# Patient Record
Sex: FEMALE | Race: WHITE | Employment: OTHER | URBAN - NONMETROPOLITAN AREA
[De-identification: names, ages, dates, MRNs, and addresses within clinical notes are randomized per-mention and may not be internally consistent; named-entity substitution may affect disease eponyms.]

---

## 2017-01-30 PROBLEM — E11.9 TYPE 2 DIABETES MELLITUS WITHOUT COMPLICATION (HCC): Status: ACTIVE | Noted: 2017-01-30

## 2017-01-30 PROBLEM — N18.30 CHRONIC RENAL FAILURE, STAGE 3 (MODERATE) (HCC): Status: ACTIVE | Noted: 2017-01-30

## 2017-01-30 PROBLEM — I27.0 PRIMARY PULMONARY HYPERTENSION (HCC): Status: ACTIVE | Noted: 2017-01-30

## 2017-01-30 PROBLEM — I10 ESSENTIAL HYPERTENSION: Status: ACTIVE | Noted: 2017-01-30

## 2017-02-28 RX ORDER — TADALAFIL 20 MG/1
40 TABLET ORAL DAILY
Qty: 180 TABLET | Refills: 3 | Status: SHIPPED | OUTPATIENT
Start: 2017-02-28 | End: 2018-03-12 | Stop reason: SDUPTHER

## 2017-03-06 RX ORDER — LOSARTAN POTASSIUM 100 MG/1
TABLET ORAL
Qty: 90 TABLET | Refills: 3 | Status: SHIPPED | OUTPATIENT
Start: 2017-03-06 | End: 2018-02-27 | Stop reason: SDUPTHER

## 2017-04-17 PROBLEM — J30.9 ALLERGIC RHINITIS: Status: ACTIVE | Noted: 2017-04-17

## 2017-05-01 ENCOUNTER — APPOINTMENT (OUTPATIENT)
Dept: VASCULAR LAB | Age: 82
DRG: 811 | End: 2017-05-01
Payer: MEDICARE

## 2017-05-01 ENCOUNTER — HOSPITAL ENCOUNTER (INPATIENT)
Age: 82
LOS: 2 days | Discharge: HOME | DRG: 811 | End: 2017-05-03
Attending: EMERGENCY MEDICINE | Admitting: EMERGENCY MEDICINE
Payer: MEDICARE

## 2017-05-01 ENCOUNTER — APPOINTMENT (OUTPATIENT)
Dept: CT IMAGING | Age: 82
DRG: 811 | End: 2017-05-01
Payer: MEDICARE

## 2017-05-01 DIAGNOSIS — G57.91 NEUROPATHY OF RIGHT LOWER EXTREMITY: Primary | ICD-10-CM

## 2017-05-01 DIAGNOSIS — D64.9 ANEMIA, UNSPECIFIED TYPE: ICD-10-CM

## 2017-05-01 DIAGNOSIS — K92.2 LOWER GI BLEED: ICD-10-CM

## 2017-05-01 PROBLEM — D50.0 ANEMIA DUE TO GI BLOOD LOSS: Status: ACTIVE | Noted: 2017-05-01

## 2017-05-01 LAB
ABSOLUTE EOS #: 0.3 K/UL (ref 0–0.4)
ABSOLUTE LYMPH #: 1.5 K/UL (ref 1–4.8)
ABSOLUTE MONO #: 0.5 K/UL (ref 0–1)
ANION GAP SERPL CALCULATED.3IONS-SCNC: 10 MMOL/L (ref 9–17)
BASOPHILS # BLD: 0 %
BASOPHILS ABSOLUTE: 0 K/UL (ref 0–0.2)
BILIRUBIN URINE: NEGATIVE
BUN BLDV-MCNC: 23 MG/DL (ref 8–23)
BUN/CREAT BLD: 24 (ref 9–20)
CALCIUM SERPL-MCNC: 8.4 MG/DL (ref 8.6–10.4)
CHLORIDE BLD-SCNC: 105 MMOL/L (ref 98–107)
CO2: 26 MMOL/L (ref 20–31)
COLOR: YELLOW
COMMENT UA: NORMAL
CREAT SERPL-MCNC: 0.97 MG/DL (ref 0.5–0.9)
D-DIMER QUANTITATIVE: 1.56 MG/L FEU (ref 0.19–0.5)
DIFFERENTIAL TYPE: ABNORMAL
EOSINOPHILS RELATIVE PERCENT: 6 %
FERRITIN: 16 UG/L (ref 13–150)
GFR AFRICAN AMERICAN: >60 ML/MIN
GFR NON-AFRICAN AMERICAN: 54 ML/MIN
GFR SERPL CREATININE-BSD FRML MDRD: ABNORMAL ML/MIN/{1.73_M2}
GFR SERPL CREATININE-BSD FRML MDRD: ABNORMAL ML/MIN/{1.73_M2}
GLUCOSE BLD-MCNC: 121 MG/DL (ref 70–99)
GLUCOSE URINE: NEGATIVE
HCT VFR BLD CALC: 26.3 % (ref 36–46)
HEMOGLOBIN: 8.3 G/DL (ref 12–16)
IRON SATURATION: 7 % (ref 20–55)
IRON: 25 UG/DL (ref 37–145)
KETONES, URINE: NEGATIVE
LEUKOCYTE ESTERASE, URINE: NEGATIVE
LYMPHOCYTES # BLD: 27 %
MCH RBC QN AUTO: 28.2 PG (ref 26–34)
MCHC RBC AUTO-ENTMCNC: 31.7 G/DL (ref 31–37)
MCV RBC AUTO: 89 FL (ref 80–100)
MONOCYTES # BLD: 10 %
NITRITE, URINE: NEGATIVE
PDW BLD-RTO: 15.9 % (ref 12.1–15.2)
PH UA: 7 (ref 5–9)
PLATELET # BLD: 207 K/UL (ref 140–450)
PLATELET ESTIMATE: ABNORMAL
PMV BLD AUTO: ABNORMAL FL (ref 6–12)
POTASSIUM SERPL-SCNC: 5.2 MMOL/L (ref 3.7–5.3)
PROTEIN UA: NEGATIVE
RBC # BLD: 2.95 M/UL (ref 4–5.2)
RBC # BLD: ABNORMAL 10*6/UL
SEDIMENTATION RATE, ERYTHROCYTE: 34 MM (ref 0–20)
SEG NEUTROPHILS: 57 %
SEGMENTED NEUTROPHILS ABSOLUTE COUNT: 3.2 K/UL (ref 1.8–7.7)
SODIUM BLD-SCNC: 141 MMOL/L (ref 135–144)
SPECIFIC GRAVITY UA: 1.01 (ref 1.01–1.02)
TOTAL IRON BINDING CAPACITY: 346 UG/DL (ref 250–450)
TURBIDITY: CLEAR
UNSATURATED IRON BINDING CAPACITY: 321.1 UG/DL (ref 112–347)
URIC ACID: 6.4 MG/DL (ref 2.4–5.7)
URINE HGB: NEGATIVE
UROBILINOGEN, URINE: NORMAL
WBC # BLD: 5.6 K/UL (ref 3.5–11)
WBC # BLD: ABNORMAL 10*3/UL

## 2017-05-01 PROCEDURE — 84550 ASSAY OF BLOOD/URIC ACID: CPT

## 2017-05-01 PROCEDURE — 99285 EMERGENCY DEPT VISIT HI MDM: CPT

## 2017-05-01 PROCEDURE — 1200000000 HC SEMI PRIVATE

## 2017-05-01 PROCEDURE — 72131 CT LUMBAR SPINE W/O DYE: CPT

## 2017-05-01 PROCEDURE — 36415 COLL VENOUS BLD VENIPUNCTURE: CPT

## 2017-05-01 PROCEDURE — 93971 EXTREMITY STUDY: CPT

## 2017-05-01 PROCEDURE — 85379 FIBRIN DEGRADATION QUANT: CPT

## 2017-05-01 PROCEDURE — 6370000000 HC RX 637 (ALT 250 FOR IP): Performed by: FAMILY MEDICINE

## 2017-05-01 PROCEDURE — 83540 ASSAY OF IRON: CPT

## 2017-05-01 PROCEDURE — 82728 ASSAY OF FERRITIN: CPT

## 2017-05-01 PROCEDURE — 85651 RBC SED RATE NONAUTOMATED: CPT

## 2017-05-01 PROCEDURE — 81003 URINALYSIS AUTO W/O SCOPE: CPT

## 2017-05-01 PROCEDURE — 83550 IRON BINDING TEST: CPT

## 2017-05-01 PROCEDURE — 85025 COMPLETE CBC W/AUTO DIFF WBC: CPT

## 2017-05-01 PROCEDURE — 80048 BASIC METABOLIC PNL TOTAL CA: CPT

## 2017-05-01 PROCEDURE — 2580000003 HC RX 258: Performed by: FAMILY MEDICINE

## 2017-05-01 RX ORDER — LOSARTAN POTASSIUM 50 MG/1
50 TABLET ORAL DAILY
Status: DISCONTINUED | OUTPATIENT
Start: 2017-05-02 | End: 2017-05-03 | Stop reason: HOSPADM

## 2017-05-01 RX ORDER — FERROUS SULFATE 325(65) MG
325 TABLET ORAL
Status: DISCONTINUED | OUTPATIENT
Start: 2017-05-02 | End: 2017-05-03 | Stop reason: HOSPADM

## 2017-05-01 RX ORDER — ACETAMINOPHEN 325 MG/1
650 TABLET ORAL EVERY 4 HOURS PRN
Status: DISCONTINUED | OUTPATIENT
Start: 2017-05-01 | End: 2017-05-03 | Stop reason: HOSPADM

## 2017-05-01 RX ORDER — ATORVASTATIN CALCIUM 40 MG/1
40 TABLET, FILM COATED ORAL DAILY
Status: DISCONTINUED | OUTPATIENT
Start: 2017-05-01 | End: 2017-05-03 | Stop reason: HOSPADM

## 2017-05-01 RX ORDER — TADALAFIL 20 MG/1
40 TABLET ORAL DAILY
Status: DISCONTINUED | OUTPATIENT
Start: 2017-05-01 | End: 2017-05-03 | Stop reason: HOSPADM

## 2017-05-01 RX ORDER — FUROSEMIDE 20 MG/1
20 TABLET ORAL DAILY
Status: DISCONTINUED | OUTPATIENT
Start: 2017-05-02 | End: 2017-05-03 | Stop reason: HOSPADM

## 2017-05-01 RX ORDER — SODIUM CHLORIDE 0.9 % (FLUSH) 0.9 %
10 SYRINGE (ML) INJECTION EVERY 12 HOURS SCHEDULED
Status: DISCONTINUED | OUTPATIENT
Start: 2017-05-01 | End: 2017-05-03 | Stop reason: HOSPADM

## 2017-05-01 RX ORDER — ONDANSETRON 2 MG/ML
4 INJECTION INTRAMUSCULAR; INTRAVENOUS EVERY 6 HOURS PRN
Status: DISCONTINUED | OUTPATIENT
Start: 2017-05-01 | End: 2017-05-03 | Stop reason: HOSPADM

## 2017-05-01 RX ORDER — SODIUM CHLORIDE 9 MG/ML
INJECTION, SOLUTION INTRAVENOUS CONTINUOUS
Status: DISCONTINUED | OUTPATIENT
Start: 2017-05-01 | End: 2017-05-03

## 2017-05-01 RX ORDER — ROPINIROLE 0.25 MG/1
0.5 TABLET, FILM COATED ORAL NIGHTLY
Status: DISCONTINUED | OUTPATIENT
Start: 2017-05-01 | End: 2017-05-03 | Stop reason: HOSPADM

## 2017-05-01 RX ORDER — PANTOPRAZOLE SODIUM 40 MG/1
40 TABLET, DELAYED RELEASE ORAL
Status: DISCONTINUED | OUTPATIENT
Start: 2017-05-02 | End: 2017-05-03 | Stop reason: HOSPADM

## 2017-05-01 RX ORDER — SODIUM CHLORIDE 0.9 % (FLUSH) 0.9 %
10 SYRINGE (ML) INJECTION PRN
Status: DISCONTINUED | OUTPATIENT
Start: 2017-05-01 | End: 2017-05-03 | Stop reason: HOSPADM

## 2017-05-01 RX ORDER — LEVOTHYROXINE SODIUM 0.1 MG/1
100 TABLET ORAL DAILY
Status: DISCONTINUED | OUTPATIENT
Start: 2017-05-02 | End: 2017-05-03 | Stop reason: HOSPADM

## 2017-05-01 RX ORDER — CARVEDILOL 25 MG/1
25 TABLET ORAL 2 TIMES DAILY WITH MEALS
Status: DISCONTINUED | OUTPATIENT
Start: 2017-05-01 | End: 2017-05-03 | Stop reason: HOSPADM

## 2017-05-01 RX ADMIN — CARVEDILOL 25 MG: 25 TABLET, FILM COATED ORAL at 17:38

## 2017-05-01 RX ADMIN — ROPINIROLE HYDROCHLORIDE 0.5 MG: 0.25 TABLET, FILM COATED ORAL at 22:00

## 2017-05-01 RX ADMIN — SODIUM CHLORIDE: 9 INJECTION, SOLUTION INTRAVENOUS at 17:16

## 2017-05-01 RX ADMIN — ATORVASTATIN CALCIUM 40 MG: 40 TABLET, FILM COATED ORAL at 17:38

## 2017-05-01 ASSESSMENT — PAIN SCALES - GENERAL
PAINLEVEL_OUTOF10: 0
PAINLEVEL_OUTOF10: 1

## 2017-05-01 ASSESSMENT — ENCOUNTER SYMPTOMS
WHEEZING: 0
BACK PAIN: 0
COUGH: 0
COLOR CHANGE: 0
SHORTNESS OF BREATH: 0
NAUSEA: 0

## 2017-05-01 ASSESSMENT — PAIN DESCRIPTION - LOCATION: LOCATION: FOOT;LEG

## 2017-05-01 ASSESSMENT — PAIN DESCRIPTION - DESCRIPTORS: DESCRIPTORS: NUMBNESS

## 2017-05-01 ASSESSMENT — PAIN DESCRIPTION - FREQUENCY: FREQUENCY: CONTINUOUS

## 2017-05-01 ASSESSMENT — PAIN DESCRIPTION - PAIN TYPE: TYPE: ACUTE PAIN

## 2017-05-01 ASSESSMENT — PAIN DESCRIPTION - ORIENTATION: ORIENTATION: RIGHT

## 2017-05-02 ENCOUNTER — APPOINTMENT (OUTPATIENT)
Dept: CT IMAGING | Age: 82
DRG: 811 | End: 2017-05-02
Payer: MEDICARE

## 2017-05-02 LAB
ABSOLUTE EOS #: 0.3 K/UL (ref 0–0.4)
ABSOLUTE LYMPH #: 1.9 K/UL (ref 1–4.8)
ABSOLUTE MONO #: 0.6 K/UL (ref 0.2–0.8)
ALBUMIN SERPL-MCNC: 3.1 G/DL (ref 3.5–5.2)
ALBUMIN/GLOBULIN RATIO: 1.6 (ref 1–2.5)
ALP BLD-CCNC: 68 U/L (ref 35–104)
ALT SERPL-CCNC: 12 U/L (ref 5–33)
ANION GAP SERPL CALCULATED.3IONS-SCNC: 10 MMOL/L (ref 9–17)
AST SERPL-CCNC: 20 U/L
BASOPHILS # BLD: 1 %
BASOPHILS ABSOLUTE: 0 K/UL (ref 0–0.2)
BILIRUB SERPL-MCNC: 0.26 MG/DL (ref 0.3–1.2)
BUN BLDV-MCNC: 19 MG/DL (ref 8–23)
BUN/CREAT BLD: 25 (ref 9–20)
CALCIUM SERPL-MCNC: 8.1 MG/DL (ref 8.6–10.4)
CHLORIDE BLD-SCNC: 108 MMOL/L (ref 98–107)
CO2: 24 MMOL/L (ref 20–31)
CREAT SERPL-MCNC: 0.76 MG/DL (ref 0.5–0.9)
DIFFERENTIAL TYPE: ABNORMAL
EOSINOPHILS RELATIVE PERCENT: 4 %
GFR AFRICAN AMERICAN: >60 ML/MIN
GFR NON-AFRICAN AMERICAN: >60 ML/MIN
GFR SERPL CREATININE-BSD FRML MDRD: ABNORMAL ML/MIN/{1.73_M2}
GFR SERPL CREATININE-BSD FRML MDRD: ABNORMAL ML/MIN/{1.73_M2}
GLUCOSE BLD-MCNC: 91 MG/DL (ref 70–99)
HCT VFR BLD CALC: 24.7 % (ref 36–46)
HCT VFR BLD CALC: 25.8 % (ref 36–46)
HCT VFR BLD CALC: 27.7 % (ref 36–46)
HEMOGLOBIN: 8.1 G/DL (ref 12–16)
HEMOGLOBIN: 8.2 G/DL (ref 12–16)
HEMOGLOBIN: 8.7 G/DL (ref 12–16)
LYMPHOCYTES # BLD: 29 %
MCH RBC QN AUTO: 29.8 PG (ref 26–34)
MCHC RBC AUTO-ENTMCNC: 33.2 G/DL (ref 31–37)
MCV RBC AUTO: 89.9 FL (ref 80–100)
MONOCYTES # BLD: 10 %
PDW BLD-RTO: 14.3 % (ref 12.1–15.2)
PLATELET # BLD: 173 K/UL (ref 140–450)
PLATELET ESTIMATE: ABNORMAL
PMV BLD AUTO: ABNORMAL FL (ref 6–12)
POTASSIUM SERPL-SCNC: 4.8 MMOL/L (ref 3.7–5.3)
RBC # BLD: 2.74 M/UL (ref 4–5.2)
RBC # BLD: ABNORMAL 10*6/UL
SEG NEUTROPHILS: 56 %
SEGMENTED NEUTROPHILS ABSOLUTE COUNT: 3.8 K/UL (ref 1.8–7.7)
SODIUM BLD-SCNC: 142 MMOL/L (ref 135–144)
TOTAL PROTEIN: 5.1 G/DL (ref 6.4–8.3)
WBC # BLD: 6.6 K/UL (ref 3.5–11)
WBC # BLD: ABNORMAL 10*3/UL

## 2017-05-02 PROCEDURE — 85018 HEMOGLOBIN: CPT

## 2017-05-02 PROCEDURE — 36415 COLL VENOUS BLD VENIPUNCTURE: CPT

## 2017-05-02 PROCEDURE — 2580000003 HC RX 258: Performed by: FAMILY MEDICINE

## 2017-05-02 PROCEDURE — 85014 HEMATOCRIT: CPT

## 2017-05-02 PROCEDURE — 6370000000 HC RX 637 (ALT 250 FOR IP): Performed by: FAMILY MEDICINE

## 2017-05-02 PROCEDURE — 6360000004 HC RX CONTRAST MEDICATION: Performed by: FAMILY MEDICINE

## 2017-05-02 PROCEDURE — 1200000000 HC SEMI PRIVATE

## 2017-05-02 PROCEDURE — 74177 CT ABD & PELVIS W/CONTRAST: CPT

## 2017-05-02 PROCEDURE — 80053 COMPREHEN METABOLIC PANEL: CPT

## 2017-05-02 PROCEDURE — 6360000002 HC RX W HCPCS: Performed by: FAMILY MEDICINE

## 2017-05-02 PROCEDURE — 85025 COMPLETE CBC W/AUTO DIFF WBC: CPT

## 2017-05-02 RX ADMIN — ROPINIROLE HYDROCHLORIDE 0.5 MG: 0.25 TABLET, FILM COATED ORAL at 20:58

## 2017-05-02 RX ADMIN — LEVOTHYROXINE SODIUM 100 MCG: 100 TABLET ORAL at 06:59

## 2017-05-02 RX ADMIN — FERROUS SULFATE TAB 325 MG (65 MG ELEMENTAL FE) 325 MG: 325 (65 FE) TAB at 08:19

## 2017-05-02 RX ADMIN — ATORVASTATIN CALCIUM 40 MG: 40 TABLET, FILM COATED ORAL at 08:19

## 2017-05-02 RX ADMIN — LOSARTAN POTASSIUM 50 MG: 50 TABLET, FILM COATED ORAL at 08:18

## 2017-05-02 RX ADMIN — CARVEDILOL 25 MG: 25 TABLET, FILM COATED ORAL at 17:10

## 2017-05-02 RX ADMIN — TADALAFIL 40 MG: 20 TABLET ORAL at 08:21

## 2017-05-02 RX ADMIN — IRON SUCROSE 300 MG: 20 INJECTION, SOLUTION INTRAVENOUS at 07:54

## 2017-05-02 RX ADMIN — PANTOPRAZOLE SODIUM 40 MG: 40 TABLET, DELAYED RELEASE ORAL at 06:59

## 2017-05-02 RX ADMIN — IOHEXOL 25 ML: 240 INJECTION, SOLUTION INTRATHECAL; INTRAVASCULAR; INTRAVENOUS; ORAL at 10:32

## 2017-05-02 RX ADMIN — FUROSEMIDE 20 MG: 20 TABLET ORAL at 08:18

## 2017-05-02 RX ADMIN — CARVEDILOL 25 MG: 25 TABLET, FILM COATED ORAL at 08:19

## 2017-05-02 RX ADMIN — IOVERSOL 100 ML: 678 INJECTION INTRA-ARTERIAL; INTRAVENOUS at 10:32

## 2017-05-02 RX ADMIN — SODIUM CHLORIDE: 9 INJECTION, SOLUTION INTRAVENOUS at 06:58

## 2017-05-02 ASSESSMENT — PAIN SCALES - GENERAL
PAINLEVEL_OUTOF10: 0

## 2017-05-03 VITALS
HEART RATE: 70 BPM | OXYGEN SATURATION: 94 % | HEIGHT: 63 IN | RESPIRATION RATE: 18 BRPM | DIASTOLIC BLOOD PRESSURE: 79 MMHG | WEIGHT: 139.6 LBS | TEMPERATURE: 99.2 F | SYSTOLIC BLOOD PRESSURE: 148 MMHG | BODY MASS INDEX: 24.73 KG/M2

## 2017-05-03 PROBLEM — D50.9 IRON DEFICIENCY ANEMIA: Chronic | Status: ACTIVE | Noted: 2017-05-03

## 2017-05-03 PROBLEM — K57.91 DIVERTICULOSIS OF INTESTINE WITH BLEEDING: Status: ACTIVE | Noted: 2017-05-03

## 2017-05-03 LAB
HCT VFR BLD CALC: 25.6 % (ref 36–46)
HEMOGLOBIN: 8.2 G/DL (ref 12–16)

## 2017-05-03 PROCEDURE — 2580000003 HC RX 258: Performed by: FAMILY MEDICINE

## 2017-05-03 PROCEDURE — 85014 HEMATOCRIT: CPT

## 2017-05-03 PROCEDURE — 90732 PPSV23 VACC 2 YRS+ SUBQ/IM: CPT | Performed by: FAMILY MEDICINE

## 2017-05-03 PROCEDURE — 6370000000 HC RX 637 (ALT 250 FOR IP): Performed by: FAMILY MEDICINE

## 2017-05-03 PROCEDURE — 85018 HEMOGLOBIN: CPT

## 2017-05-03 PROCEDURE — 6360000002 HC RX W HCPCS: Performed by: FAMILY MEDICINE

## 2017-05-03 PROCEDURE — G0009 ADMIN PNEUMOCOCCAL VACCINE: HCPCS | Performed by: FAMILY MEDICINE

## 2017-05-03 RX ADMIN — FUROSEMIDE 20 MG: 20 TABLET ORAL at 08:47

## 2017-05-03 RX ADMIN — TADALAFIL 40 MG: 20 TABLET ORAL at 08:48

## 2017-05-03 RX ADMIN — LEVOTHYROXINE SODIUM 100 MCG: 100 TABLET ORAL at 06:45

## 2017-05-03 RX ADMIN — LOSARTAN POTASSIUM 50 MG: 50 TABLET, FILM COATED ORAL at 08:47

## 2017-05-03 RX ADMIN — FERROUS SULFATE TAB 325 MG (65 MG ELEMENTAL FE) 325 MG: 325 (65 FE) TAB at 08:47

## 2017-05-03 RX ADMIN — CARVEDILOL 25 MG: 25 TABLET, FILM COATED ORAL at 08:47

## 2017-05-03 RX ADMIN — SODIUM CHLORIDE: 9 INJECTION, SOLUTION INTRAVENOUS at 00:22

## 2017-05-03 RX ADMIN — ATORVASTATIN CALCIUM 40 MG: 40 TABLET, FILM COATED ORAL at 08:47

## 2017-05-03 RX ADMIN — Medication 10 ML: at 08:47

## 2017-05-03 RX ADMIN — PANTOPRAZOLE SODIUM 40 MG: 40 TABLET, DELAYED RELEASE ORAL at 06:45

## 2017-05-03 RX ADMIN — PNEUMOCOCCAL VACCINE POLYVALENT 0.5 ML
25; 25; 25; 25; 25; 25; 25; 25; 25; 25; 25; 25; 25; 25; 25; 25; 25; 25; 25; 25; 25; 25; 25 INJECTION, SOLUTION INTRAMUSCULAR; SUBCUTANEOUS at 10:16

## 2017-05-10 ENCOUNTER — OFFICE VISIT (OUTPATIENT)
Dept: CARDIOLOGY | Age: 82
End: 2017-05-10
Payer: MEDICARE

## 2017-05-10 VITALS
HEIGHT: 63 IN | RESPIRATION RATE: 16 BRPM | HEART RATE: 65 BPM | SYSTOLIC BLOOD PRESSURE: 197 MMHG | OXYGEN SATURATION: 94 % | BODY MASS INDEX: 24.1 KG/M2 | WEIGHT: 136 LBS | DIASTOLIC BLOOD PRESSURE: 95 MMHG

## 2017-05-10 DIAGNOSIS — I49.1 PAC (PREMATURE ATRIAL CONTRACTION): ICD-10-CM

## 2017-05-10 DIAGNOSIS — I27.21 PULMONARY ARTERY HYPERTENSION (HCC): Primary | ICD-10-CM

## 2017-05-10 DIAGNOSIS — I50.32 CHRONIC DIASTOLIC HF (HEART FAILURE) (HCC): ICD-10-CM

## 2017-05-10 DIAGNOSIS — I10 HYPERTENSION, UNCONTROLLED: ICD-10-CM

## 2017-05-10 PROCEDURE — 1111F DSCHRG MED/CURRENT MED MERGE: CPT | Performed by: FAMILY MEDICINE

## 2017-05-10 PROCEDURE — G8420 CALC BMI NORM PARAMETERS: HCPCS | Performed by: FAMILY MEDICINE

## 2017-05-10 PROCEDURE — 1123F ACP DISCUSS/DSCN MKR DOCD: CPT | Performed by: FAMILY MEDICINE

## 2017-05-10 PROCEDURE — 4040F PNEUMOC VAC/ADMIN/RCVD: CPT | Performed by: FAMILY MEDICINE

## 2017-05-10 PROCEDURE — G8599 NO ASA/ANTIPLAT THER USE RNG: HCPCS | Performed by: FAMILY MEDICINE

## 2017-05-10 PROCEDURE — 1036F TOBACCO NON-USER: CPT | Performed by: FAMILY MEDICINE

## 2017-05-10 PROCEDURE — 99214 OFFICE O/P EST MOD 30 MIN: CPT | Performed by: FAMILY MEDICINE

## 2017-05-10 PROCEDURE — 1090F PRES/ABSN URINE INCON ASSESS: CPT | Performed by: FAMILY MEDICINE

## 2017-05-10 PROCEDURE — G8427 DOCREV CUR MEDS BY ELIG CLIN: HCPCS | Performed by: FAMILY MEDICINE

## 2017-05-10 PROCEDURE — 93000 ELECTROCARDIOGRAM COMPLETE: CPT | Performed by: FAMILY MEDICINE

## 2017-05-10 RX ORDER — AMLODIPINE BESYLATE 5 MG/1
5 TABLET ORAL DAILY
Qty: 90 TABLET | Refills: 3 | Status: SHIPPED | OUTPATIENT
Start: 2017-05-10 | End: 2018-05-03 | Stop reason: SDUPTHER

## 2017-05-26 ENCOUNTER — HOSPITAL ENCOUNTER (OUTPATIENT)
Dept: NON INVASIVE DIAGNOSTICS | Age: 82
Discharge: HOME | End: 2017-05-26
Payer: MEDICARE

## 2017-05-26 ENCOUNTER — HOSPITAL ENCOUNTER (OUTPATIENT)
Dept: LAB | Age: 82
Discharge: HOME | End: 2017-05-26
Payer: MEDICARE

## 2017-05-26 DIAGNOSIS — I27.21 PULMONARY ARTERY HYPERTENSION (HCC): ICD-10-CM

## 2017-05-26 DIAGNOSIS — E78.49 OTHER HYPERLIPIDEMIA: ICD-10-CM

## 2017-05-26 DIAGNOSIS — E11.9 TYPE 2 DIABETES MELLITUS WITHOUT COMPLICATION, UNSPECIFIED LONG TERM INSULIN USE STATUS: ICD-10-CM

## 2017-05-26 LAB
ALT SERPL-CCNC: 9 U/L (ref 5–33)
ANION GAP SERPL CALCULATED.3IONS-SCNC: 11 MMOL/L (ref 9–17)
AST SERPL-CCNC: 23 U/L
BUN BLDV-MCNC: 19 MG/DL (ref 8–23)
BUN/CREAT BLD: 23 (ref 9–20)
CALCIUM SERPL-MCNC: 9.7 MG/DL (ref 8.6–10.4)
CHLORIDE BLD-SCNC: 103 MMOL/L (ref 98–107)
CO2: 27 MMOL/L (ref 20–31)
CREAT SERPL-MCNC: 0.81 MG/DL (ref 0.5–0.9)
CREATININE URINE: 41.3 MG/DL (ref 28–217)
ESTIMATED AVERAGE GLUCOSE: 114 MG/DL
GFR AFRICAN AMERICAN: >60 ML/MIN
GFR NON-AFRICAN AMERICAN: >60 ML/MIN
GFR SERPL CREATININE-BSD FRML MDRD: ABNORMAL ML/MIN/{1.73_M2}
GFR SERPL CREATININE-BSD FRML MDRD: ABNORMAL ML/MIN/{1.73_M2}
GLUCOSE BLD-MCNC: 116 MG/DL (ref 70–99)
HBA1C MFR BLD: 5.6 % (ref 4.8–5.9)
HCT VFR BLD CALC: 35.4 % (ref 36–46)
HEMOGLOBIN: 11.3 G/DL (ref 12–16)
LV EF: 55 %
LVEF MODALITY: NORMAL
MCH RBC QN AUTO: 29.2 PG (ref 26–34)
MCHC RBC AUTO-ENTMCNC: 31.8 G/DL (ref 31–37)
MCV RBC AUTO: 91.8 FL (ref 80–100)
MICROALBUMIN/CREAT 24H UR: 20 MG/L
MICROALBUMIN/CREAT UR-RTO: 48 MCG/MG CREAT
PDW BLD-RTO: 21.5 % (ref 12.1–15.2)
PLATELET # BLD: 251 K/UL (ref 140–450)
PMV BLD AUTO: ABNORMAL FL (ref 6–12)
POTASSIUM SERPL-SCNC: 3.9 MMOL/L (ref 3.7–5.3)
RBC # BLD: 3.85 M/UL (ref 4–5.2)
SODIUM BLD-SCNC: 141 MMOL/L (ref 135–144)
T4 TOTAL: 10.4 UG/DL (ref 4.5–12)
TSH SERPL DL<=0.05 MIU/L-ACNC: 0.56 MIU/L (ref 0.3–5)
WBC # BLD: 6.2 K/UL (ref 3.5–11)

## 2017-05-26 PROCEDURE — 93306 TTE W/DOPPLER COMPLETE: CPT

## 2017-05-26 PROCEDURE — 80048 BASIC METABOLIC PNL TOTAL CA: CPT

## 2017-05-26 PROCEDURE — 85027 COMPLETE CBC AUTOMATED: CPT

## 2017-05-26 PROCEDURE — 84436 ASSAY OF TOTAL THYROXINE: CPT

## 2017-05-26 PROCEDURE — 84460 ALANINE AMINO (ALT) (SGPT): CPT

## 2017-05-26 PROCEDURE — 84450 TRANSFERASE (AST) (SGOT): CPT

## 2017-05-26 PROCEDURE — 82570 ASSAY OF URINE CREATININE: CPT

## 2017-05-26 PROCEDURE — 36415 COLL VENOUS BLD VENIPUNCTURE: CPT

## 2017-05-26 PROCEDURE — 84443 ASSAY THYROID STIM HORMONE: CPT

## 2017-05-26 PROCEDURE — 83036 HEMOGLOBIN GLYCOSYLATED A1C: CPT

## 2017-05-26 PROCEDURE — 82043 UR ALBUMIN QUANTITATIVE: CPT

## 2017-05-30 ENCOUNTER — TELEPHONE (OUTPATIENT)
Dept: CARDIOLOGY | Age: 82
End: 2017-05-30

## 2017-06-02 PROBLEM — K57.92 DIVERTICULITIS OF INTESTINE WITHOUT PERFORATION OR ABSCESS WITHOUT BLEEDING: Status: ACTIVE | Noted: 2017-06-02

## 2017-06-03 PROBLEM — K57.92 DIVERTICULITIS OF INTESTINE WITHOUT PERFORATION OR ABSCESS WITHOUT BLEEDING: Status: RESOLVED | Noted: 2017-06-02 | Resolved: 2017-06-03

## 2017-06-03 PROBLEM — E11.9 TYPE 2 DIABETES MELLITUS WITHOUT COMPLICATION (HCC): Status: RESOLVED | Noted: 2017-01-30 | Resolved: 2017-06-03

## 2017-07-12 ENCOUNTER — HOSPITAL ENCOUNTER (EMERGENCY)
Age: 82
Discharge: HOME | End: 2017-07-12
Attending: INTERNAL MEDICINE
Payer: MEDICARE

## 2017-07-12 VITALS
TEMPERATURE: 97.8 F | RESPIRATION RATE: 18 BRPM | DIASTOLIC BLOOD PRESSURE: 74 MMHG | SYSTOLIC BLOOD PRESSURE: 133 MMHG | HEART RATE: 61 BPM | OXYGEN SATURATION: 98 % | BODY MASS INDEX: 23.03 KG/M2 | WEIGHT: 130 LBS

## 2017-07-12 DIAGNOSIS — N30.01 ACUTE CYSTITIS WITH HEMATURIA: Primary | ICD-10-CM

## 2017-07-12 LAB
-: ABNORMAL
AMORPHOUS: ABNORMAL
BACTERIA: ABNORMAL
BILIRUBIN URINE: NEGATIVE
CASTS UA: ABNORMAL /LPF
COLOR: YELLOW
COMMENT UA: ABNORMAL
CRYSTALS, UA: ABNORMAL /HPF
EPITHELIAL CELLS UA: ABNORMAL /HPF (ref 0–25)
GLUCOSE URINE: NEGATIVE
KETONES, URINE: NEGATIVE
LEUKOCYTE ESTERASE, URINE: ABNORMAL
MUCUS: ABNORMAL
NITRITE, URINE: POSITIVE
OTHER OBSERVATIONS UA: ABNORMAL
PH UA: 6 (ref 5–9)
PROTEIN UA: ABNORMAL
RBC UA: ABNORMAL /HPF (ref 0–2)
RENAL EPITHELIAL, UA: ABNORMAL /HPF
SPECIFIC GRAVITY UA: 1.02 (ref 1.01–1.02)
TRICHOMONAS: ABNORMAL
TURBIDITY: ABNORMAL
URINE HGB: ABNORMAL
UROBILINOGEN, URINE: NORMAL
WBC UA: ABNORMAL /HPF (ref 0–5)
YEAST: ABNORMAL

## 2017-07-12 PROCEDURE — 6370000000 HC RX 637 (ALT 250 FOR IP): Performed by: INTERNAL MEDICINE

## 2017-07-12 PROCEDURE — 99283 EMERGENCY DEPT VISIT LOW MDM: CPT

## 2017-07-12 PROCEDURE — 81001 URINALYSIS AUTO W/SCOPE: CPT

## 2017-07-12 RX ORDER — NITROFURANTOIN 25; 75 MG/1; MG/1
100 CAPSULE ORAL EVERY 12 HOURS SCHEDULED
Status: DISCONTINUED | OUTPATIENT
Start: 2017-07-12 | End: 2017-07-12 | Stop reason: HOSPADM

## 2017-07-12 RX ORDER — NITROFURANTOIN 25; 75 MG/1; MG/1
100 CAPSULE ORAL 2 TIMES DAILY
Qty: 20 CAPSULE | Refills: 0 | Status: SHIPPED | OUTPATIENT
Start: 2017-07-12 | End: 2017-07-22

## 2017-07-12 RX ADMIN — NITROFURANTOIN MONOHYDRATE/MACROCRYSTALLINE 100 MG: 25; 75 CAPSULE ORAL at 07:58

## 2017-07-12 ASSESSMENT — ENCOUNTER SYMPTOMS
EYE PAIN: 0
SORE THROAT: 0
BACK PAIN: 0
COUGH: 0
ABDOMINAL PAIN: 0
EYE DISCHARGE: 0
DIARRHEA: 0
NAUSEA: 0
CHEST TIGHTNESS: 0
SHORTNESS OF BREATH: 0
VOMITING: 0

## 2017-07-14 PROBLEM — R53.81 MALAISE: Status: ACTIVE | Noted: 2017-07-14

## 2017-09-21 ENCOUNTER — HOSPITAL ENCOUNTER (OUTPATIENT)
Dept: LAB | Age: 82
Discharge: HOME | End: 2017-09-21
Payer: MEDICARE

## 2017-09-21 DIAGNOSIS — E78.5 HYPERLIPIDEMIA, UNSPECIFIED: ICD-10-CM

## 2017-09-21 DIAGNOSIS — E11.9 TYPE 2 DIABETES MELLITUS WITHOUT COMPLICATION, UNSPECIFIED LONG TERM INSULIN USE STATUS: ICD-10-CM

## 2017-09-21 DIAGNOSIS — E03.9 HYPOTHYROIDISM, UNSPECIFIED TYPE: ICD-10-CM

## 2017-09-21 LAB
ALT SERPL-CCNC: 10 U/L (ref 5–33)
ANION GAP SERPL CALCULATED.3IONS-SCNC: 10 MMOL/L (ref 9–17)
AST SERPL-CCNC: 19 U/L
BUN BLDV-MCNC: 28 MG/DL (ref 8–23)
BUN/CREAT BLD: 32 (ref 9–20)
CALCIUM SERPL-MCNC: 9.2 MG/DL (ref 8.6–10.4)
CHLORIDE BLD-SCNC: 106 MMOL/L (ref 98–107)
CO2: 27 MMOL/L (ref 20–31)
CREAT SERPL-MCNC: 0.88 MG/DL (ref 0.5–0.9)
GFR AFRICAN AMERICAN: >60 ML/MIN
GFR NON-AFRICAN AMERICAN: >60 ML/MIN
GFR SERPL CREATININE-BSD FRML MDRD: ABNORMAL ML/MIN/{1.73_M2}
GFR SERPL CREATININE-BSD FRML MDRD: ABNORMAL ML/MIN/{1.73_M2}
GLUCOSE BLD-MCNC: 112 MG/DL (ref 70–99)
HCT VFR BLD CALC: 35.5 % (ref 36–46)
HEMOGLOBIN: 11.8 G/DL (ref 12–16)
MCH RBC QN AUTO: 32.4 PG (ref 26–34)
MCHC RBC AUTO-ENTMCNC: 33.3 G/DL (ref 31–37)
MCV RBC AUTO: 97.4 FL (ref 80–100)
PDW BLD-RTO: 15.3 % (ref 12.1–15.2)
PLATELET # BLD: 212 K/UL (ref 140–450)
PMV BLD AUTO: 10.2 FL (ref 6–12)
POTASSIUM SERPL-SCNC: 4.2 MMOL/L (ref 3.7–5.3)
RBC # BLD: 3.64 M/UL (ref 4–5.2)
SODIUM BLD-SCNC: 143 MMOL/L (ref 135–144)
T4 TOTAL: 8.7 UG/DL (ref 4.5–12)
TSH SERPL DL<=0.05 MIU/L-ACNC: 0.35 MIU/L (ref 0.3–5)
WBC # BLD: 7.1 K/UL (ref 3.5–11)

## 2017-09-21 PROCEDURE — 84443 ASSAY THYROID STIM HORMONE: CPT

## 2017-09-21 PROCEDURE — 84436 ASSAY OF TOTAL THYROXINE: CPT

## 2017-09-21 PROCEDURE — 85027 COMPLETE CBC AUTOMATED: CPT

## 2017-09-21 PROCEDURE — 84460 ALANINE AMINO (ALT) (SGPT): CPT

## 2017-09-21 PROCEDURE — 80048 BASIC METABOLIC PNL TOTAL CA: CPT

## 2017-09-21 PROCEDURE — 36415 COLL VENOUS BLD VENIPUNCTURE: CPT

## 2017-09-21 PROCEDURE — 84450 TRANSFERASE (AST) (SGOT): CPT

## 2017-10-02 PROBLEM — E11.9 TYPE 2 DIABETES MELLITUS WITHOUT COMPLICATION (HCC): Status: ACTIVE | Noted: 2017-10-02

## 2017-10-02 PROBLEM — I27.20 PULMONARY HTN (HCC): Status: ACTIVE | Noted: 2017-10-02

## 2017-10-03 PROBLEM — I27.0 PRIMARY PULMONARY HYPERTENSION (HCC): Status: RESOLVED | Noted: 2017-01-30 | Resolved: 2017-10-03

## 2017-10-03 PROBLEM — N18.30 CHRONIC RENAL FAILURE, STAGE 3 (MODERATE) (HCC): Status: RESOLVED | Noted: 2017-01-30 | Resolved: 2017-10-03

## 2017-10-27 ENCOUNTER — HOSPITAL ENCOUNTER (INPATIENT)
Age: 82
LOS: 3 days | Discharge: HOME | DRG: 872 | End: 2017-10-30
Attending: FAMILY MEDICINE | Admitting: FAMILY MEDICINE
Payer: MEDICARE

## 2017-10-27 ENCOUNTER — APPOINTMENT (OUTPATIENT)
Dept: GENERAL RADIOLOGY | Age: 82
DRG: 872 | End: 2017-10-27
Attending: FAMILY MEDICINE
Payer: MEDICARE

## 2017-10-27 PROBLEM — J18.9 PNEUMONIA OF BOTH LUNGS DUE TO INFECTIOUS ORGANISM: Status: ACTIVE | Noted: 2017-10-27

## 2017-10-27 PROBLEM — E86.0 DEHYDRATION: Status: ACTIVE | Noted: 2017-10-27

## 2017-10-27 PROBLEM — J18.9 COMMUNITY ACQUIRED PNEUMONIA OF LEFT LOWER LOBE OF LUNG: Status: ACTIVE | Noted: 2017-10-27

## 2017-10-27 PROBLEM — N39.0 URINARY TRACT INFECTION WITHOUT HEMATURIA: Status: ACTIVE | Noted: 2017-10-27

## 2017-10-27 LAB
-: ABNORMAL
ABSOLUTE BANDS #: 0.77 K/UL (ref 0–1)
ABSOLUTE EOS #: 0 K/UL (ref 0–0.4)
ABSOLUTE IMMATURE GRANULOCYTE: ABNORMAL K/UL (ref 0–0.3)
ABSOLUTE LYMPH #: 1.55 K/UL (ref 1–4.8)
ABSOLUTE MONO #: 0.39 K/UL (ref 0–1)
ALBUMIN SERPL-MCNC: 3.9 G/DL (ref 3.5–5.2)
ALBUMIN/GLOBULIN RATIO: 1.3 (ref 1–2.5)
ALP BLD-CCNC: 95 U/L (ref 35–104)
ALT SERPL-CCNC: 14 U/L (ref 5–33)
AMORPHOUS: ABNORMAL
ANION GAP SERPL CALCULATED.3IONS-SCNC: 15 MMOL/L (ref 9–17)
AST SERPL-CCNC: 37 U/L
BACTERIA: ABNORMAL
BANDS: 6 %
BASOPHILS # BLD: 0 %
BASOPHILS ABSOLUTE: 0 K/UL (ref 0–0.2)
BILIRUB SERPL-MCNC: 0.49 MG/DL (ref 0.3–1.2)
BILIRUBIN URINE: NEGATIVE
BNP INTERPRETATION: ABNORMAL
BUN BLDV-MCNC: 19 MG/DL (ref 8–23)
BUN/CREAT BLD: 24 (ref 9–20)
CALCIUM SERPL-MCNC: 9.6 MG/DL (ref 8.6–10.4)
CASTS UA: ABNORMAL /LPF
CHLORIDE BLD-SCNC: 99 MMOL/L (ref 98–107)
CO2: 23 MMOL/L (ref 20–31)
COLOR: YELLOW
COMMENT UA: ABNORMAL
CREAT SERPL-MCNC: 0.8 MG/DL (ref 0.5–0.9)
CRYSTALS, UA: ABNORMAL /HPF
DIFFERENTIAL TYPE: ABNORMAL
EOSINOPHILS RELATIVE PERCENT: 0 %
EPITHELIAL CELLS UA: ABNORMAL /HPF (ref 0–25)
GFR AFRICAN AMERICAN: >60 ML/MIN
GFR NON-AFRICAN AMERICAN: >60 ML/MIN
GFR SERPL CREATININE-BSD FRML MDRD: ABNORMAL ML/MIN/{1.73_M2}
GFR SERPL CREATININE-BSD FRML MDRD: ABNORMAL ML/MIN/{1.73_M2}
GLUCOSE BLD-MCNC: 123 MG/DL (ref 70–99)
GLUCOSE URINE: NEGATIVE
HCT VFR BLD CALC: 38.3 % (ref 36–46)
HEMOGLOBIN: 12.6 G/DL (ref 12–16)
IMMATURE GRANULOCYTES: ABNORMAL %
KETONES, URINE: ABNORMAL
LEUKOCYTE ESTERASE, URINE: ABNORMAL
LYMPHOCYTES # BLD: 12 %
MCH RBC QN AUTO: 32.5 PG (ref 26–34)
MCHC RBC AUTO-ENTMCNC: 33 G/DL (ref 31–37)
MCV RBC AUTO: 98.3 FL (ref 80–100)
MONOCYTES # BLD: 3 %
MORPHOLOGY: NORMAL
MUCUS: ABNORMAL
NITRITE, URINE: NEGATIVE
OTHER OBSERVATIONS UA: ABNORMAL
PDW BLD-RTO: 15.7 % (ref 12.1–15.2)
PH UA: 6 (ref 5–9)
PLATELET # BLD: 179 K/UL (ref 140–450)
PLATELET ESTIMATE: ABNORMAL
PMV BLD AUTO: 10.4 FL (ref 6–12)
POTASSIUM SERPL-SCNC: 4 MMOL/L (ref 3.7–5.3)
PRO-BNP: 631 PG/ML
PROCALCITONIN: 0.41 NG/ML
PROTEIN UA: NEGATIVE
RBC # BLD: 3.9 M/UL (ref 4–5.2)
RBC # BLD: ABNORMAL 10*6/UL
RBC UA: ABNORMAL /HPF (ref 0–2)
RENAL EPITHELIAL, UA: ABNORMAL /HPF
SEG NEUTROPHILS: 79 %
SEGMENTED NEUTROPHILS ABSOLUTE COUNT: 10.19 K/UL (ref 1.8–7.7)
SODIUM BLD-SCNC: 137 MMOL/L (ref 135–144)
SPECIFIC GRAVITY UA: 1.01 (ref 1.01–1.02)
TOTAL PROTEIN: 6.8 G/DL (ref 6.4–8.3)
TRICHOMONAS: ABNORMAL
TURBIDITY: CLEAR
URINE HGB: ABNORMAL
UROBILINOGEN, URINE: NORMAL
WBC # BLD: 12.9 K/UL (ref 3.5–11)
WBC # BLD: ABNORMAL 10*3/UL
WBC UA: ABNORMAL /HPF (ref 0–5)
YEAST: ABNORMAL

## 2017-10-27 PROCEDURE — 71020 XR CHEST STANDARD TWO VW: CPT

## 2017-10-27 PROCEDURE — 1200000000 HC SEMI PRIVATE

## 2017-10-27 PROCEDURE — G8996 SWALLOW CURRENT STATUS: HCPCS

## 2017-10-27 PROCEDURE — 87493 C DIFF AMPLIFIED PROBE: CPT

## 2017-10-27 PROCEDURE — 87186 SC STD MICRODIL/AGAR DIL: CPT

## 2017-10-27 PROCEDURE — 80053 COMPREHEN METABOLIC PANEL: CPT

## 2017-10-27 PROCEDURE — G8998 SWALLOW D/C STATUS: HCPCS

## 2017-10-27 PROCEDURE — 81001 URINALYSIS AUTO W/SCOPE: CPT

## 2017-10-27 PROCEDURE — 87086 URINE CULTURE/COLONY COUNT: CPT

## 2017-10-27 PROCEDURE — 83880 ASSAY OF NATRIURETIC PEPTIDE: CPT

## 2017-10-27 PROCEDURE — 92610 EVALUATE SWALLOWING FUNCTION: CPT

## 2017-10-27 PROCEDURE — 85025 COMPLETE CBC W/AUTO DIFF WBC: CPT

## 2017-10-27 PROCEDURE — 84145 PROCALCITONIN (PCT): CPT

## 2017-10-27 PROCEDURE — 6370000000 HC RX 637 (ALT 250 FOR IP): Performed by: FAMILY MEDICINE

## 2017-10-27 PROCEDURE — 94762 N-INVAS EAR/PLS OXIMTRY CONT: CPT

## 2017-10-27 PROCEDURE — 87088 URINE BACTERIA CULTURE: CPT

## 2017-10-27 PROCEDURE — 36415 COLL VENOUS BLD VENIPUNCTURE: CPT

## 2017-10-27 PROCEDURE — 6360000002 HC RX W HCPCS: Performed by: FAMILY MEDICINE

## 2017-10-27 PROCEDURE — 2580000003 HC RX 258: Performed by: FAMILY MEDICINE

## 2017-10-27 PROCEDURE — G8997 SWALLOW GOAL STATUS: HCPCS

## 2017-10-27 PROCEDURE — 87040 BLOOD CULTURE FOR BACTERIA: CPT

## 2017-10-27 RX ORDER — TADALAFIL 20 MG/1
40 TABLET ORAL DAILY
Status: DISCONTINUED | OUTPATIENT
Start: 2017-10-27 | End: 2017-10-30 | Stop reason: HOSPADM

## 2017-10-27 RX ORDER — FAMOTIDINE 20 MG/1
20 TABLET, FILM COATED ORAL DAILY
Status: DISCONTINUED | OUTPATIENT
Start: 2017-10-27 | End: 2017-10-30 | Stop reason: HOSPADM

## 2017-10-27 RX ORDER — ONDANSETRON 2 MG/ML
4 INJECTION INTRAMUSCULAR; INTRAVENOUS EVERY 6 HOURS PRN
Status: DISCONTINUED | OUTPATIENT
Start: 2017-10-27 | End: 2017-10-30 | Stop reason: HOSPADM

## 2017-10-27 RX ORDER — SODIUM CHLORIDE 0.9 % (FLUSH) 0.9 %
10 SYRINGE (ML) INJECTION EVERY 12 HOURS SCHEDULED
Status: DISCONTINUED | OUTPATIENT
Start: 2017-10-27 | End: 2017-10-30 | Stop reason: HOSPADM

## 2017-10-27 RX ORDER — SODIUM CHLORIDE 0.9 % (FLUSH) 0.9 %
10 SYRINGE (ML) INJECTION PRN
Status: DISCONTINUED | OUTPATIENT
Start: 2017-10-27 | End: 2017-10-30 | Stop reason: HOSPADM

## 2017-10-27 RX ORDER — ACETAMINOPHEN 325 MG/1
650 TABLET ORAL EVERY 4 HOURS PRN
Status: DISCONTINUED | OUTPATIENT
Start: 2017-10-27 | End: 2017-10-30 | Stop reason: HOSPADM

## 2017-10-27 RX ORDER — SODIUM CHLORIDE 9 MG/ML
INJECTION, SOLUTION INTRAVENOUS CONTINUOUS
Status: DISCONTINUED | OUTPATIENT
Start: 2017-10-27 | End: 2017-10-29

## 2017-10-27 RX ADMIN — FAMOTIDINE 20 MG: 20 TABLET, FILM COATED ORAL at 14:51

## 2017-10-27 RX ADMIN — AZITHROMYCIN MONOHYDRATE 500 MG: 500 INJECTION, POWDER, LYOPHILIZED, FOR SOLUTION INTRAVENOUS at 14:53

## 2017-10-27 RX ADMIN — ACETAMINOPHEN 650 MG: 325 TABLET, FILM COATED ORAL at 20:32

## 2017-10-27 RX ADMIN — Medication 10 ML: at 14:54

## 2017-10-27 RX ADMIN — CEFTRIAXONE 1 G: 1 INJECTION, POWDER, FOR SOLUTION INTRAMUSCULAR; INTRAVENOUS at 14:53

## 2017-10-27 RX ADMIN — Medication 10 ML: at 20:32

## 2017-10-27 RX ADMIN — TADALAFIL 40 MG: 20 TABLET ORAL at 16:24

## 2017-10-27 RX ADMIN — SODIUM CHLORIDE: 9 INJECTION, SOLUTION INTRAVENOUS at 14:53

## 2017-10-27 ASSESSMENT — PAIN SCALES - GENERAL: PAINLEVEL_OUTOF10: 2

## 2017-10-27 NOTE — PROGRESS NOTES
Patient arrived as a Direct Admit from Dr. Katya Funk. Arrived via wheelchair. Patient resting comfortably in room at this time.

## 2017-10-27 NOTE — PROGRESS NOTES
[address]  Department of Pharmacy   Pharmacy Renal Adjustment Note    Sandra Hester is a 80 y.o. female. Pharmacist assessment of renally cleared medications. No results for input(s): CREATININE in the last 72 hours. CrCl cannot be calculated (Patient's most recent lab result is older than the maximum 10 days allowed. ). Height:   Ht Readings from Last 1 Encounters:   10/27/17 5' 2\" (1.575 m)     Weight:  Wt Readings from Last 1 Encounters:   10/27/17 125 lb 14.4 oz (57.1 kg)       The following medication has been adjusted based upon renal function:             Lovenox 40 mg sq daily decreased to 30 mg sq daily. Thank you!     Humera De La Fuente,10/27/2017,1:04 PM

## 2017-10-27 NOTE — PROGRESS NOTES
independently       Recommended Diet and Intervention  Diet Solids Recommendation: Regular  Liquid Consistency Recommendation: Thin  Recommended Form of Meds: PO       Compensatory Swallowing Strategies  Compensatory Swallowing Strategies: Alternate solids and liquids;Small bites/sips;Upright as possible for all oral intake;Eat/Feed slowly    Treatment/Goals: No treatment indicated       General  Chart Reviewed: Yes  Behavior/Cognition: Cooperative; Alert  Respiratory Status: Room air  Breath Sounds: Clear  O2 Device: None (Room air)  Communication Observation: Functional  Follows Directions: Complex  Dentition: Adequate  Patient Positioning: Upright in bed  Baseline Vocal Quality: Normal  Volitional Cough: Strong  Consistencies Trialed: Reg solid; Soft solid;Puree; Thin           Vision/Hearing  Vision  Vision: Within Functional Limits  Hearing  Hearing: Within functional limits    Oral Motor Deficits  Oral/Motor  Oral Motor: Within functional limits    Oral Phase Dysfunction  Oral Phase  Oral Phase: WFL     Indicators of Pharyngeal Phase Dysfunction   Pharyngeal Phase  Pharyngeal Phase: WFL    Prognosis  Prognosis  Prognosis for safe diet advancement: good  Individuals consulted  Consulted and agree with results and recommendations: Patient; Family member  Family member consulted: Daughter    Education  Patient Education: Reviewed impressions/recommendations  Patient Education Response: Verbalizes understanding      G-Code  SLP G-Codes  Functional Assessment Tool Used: VIRY Functional Communication Measure  Functional Limitations: Swallowing  Swallow Current Status (): At least 1 percent but less than 20 percent impaired, limited or restricted  Swallow Goal Status (): At least 1 percent but less than 20 percent impaired, limited or restricted  Swallow Discharge Status ():  At least 1 percent but less than 20 percent impaired, limited or restricted         Therapy Time  SLP Individual Minutes  Time In:

## 2017-10-27 NOTE — PLAN OF CARE
Problem: Daily Care:  Goal: Daily care needs are met  Daily care needs are met  Outcome: Ongoing  Daily care needs are met with assistance with maximum encouragement being given       Problem: Pain:  Goal: Patient's pain/discomfort is manageable  Patient's pain/discomfort is manageable  Outcome: Ongoing  Patient able to verbalize when in pain and patient denies pain at this time. Pain assessed during assessments and as needed. Will continue to assess. Problem: Skin Integrity:  Goal: Skin integrity will stabilize  Skin integrity will stabilize  Outcome: Ongoing  Patient is able to turn self while in the bed and can self regulate the bed as well. There are no new open areas or redness noted upon assessments.  Will continue to assess

## 2017-10-27 NOTE — PROGRESS NOTES
[facility] Department of Pharmacy   Pharmacy Renal Adjustment Note    Chanda Matos is a 80 y.o. female. Pharmacist assessment of renally cleared medications. No results for input(s): CREATININE in the last 72 hours. CrCl cannot be calculated (Patient's most recent lab result is older than the maximum 10 days allowed. ).     Height:   Ht Readings from Last 1 Encounters:   10/27/17 5' 2\" (1.575 m)     Weight:  Wt Readings from Last 1 Encounters:   10/27/17 125 lb 14.4 oz (57.1 kg)       The following medication has been adjusted based upon renal function:             Pepcid 20 mg po BID decreased to ONCE daily for CrCl < 50 ml/min    Thank you!!    Humera De La Fuente,10/27/2017,1:06 PM

## 2017-10-27 NOTE — PROGRESS NOTES
MEDICAL NUTRITION THERAPY - CONSULT ACKNOWLEDGEMENT    Acknowledgement of consult/positive nutrition screening regarding weight loss and decreased PO pta. General diet order currently on chart. Note weight history below. Labs reviewed. Recent Labs      10/27/17   1215   NA  137   K  4.0   CL  99   CO2  23   BUN  19   CREATININE  0.80   GLUCOSE  123*   ALT  14   ALKPHOS  95   GFR                  Lab Results   Component Value Date    LABALBU 3.9 10/27/2017    LABALBU 4.1 05/01/2012      Will add 4 oz ensure enlive TID with meals and f/u with further recommendations as indicated.     Wt Readings from Last 10 Encounters:   10/27/17 125 lb 14.4 oz (57.1 kg)   10/27/17 126 lb (57.2 kg)   10/02/17 132 lb (59.9 kg)   07/14/17 128 lb (58.1 kg)   07/12/17 130 lb (59 kg)   06/02/17 131 lb 3.2 oz (59.5 kg)   05/10/17 136 lb (61.7 kg)   05/03/17 139 lb 9.6 oz (63.3 kg)   04/17/17 144 lb (65.3 kg)   01/30/17 137 lb (62.1 kg)        Steven Domingo RDN, LD 10/27/2017 2:40 PM

## 2017-10-27 NOTE — H&P
Hospital Medicine  History and Physical    Patient:  Alena Saldivar  MRN: [id number]    Chief Complaint:  Weak and fatigued  fever    History Obtained From:  patient, family member - daughter and son  PCP: Tammi Abdi MD    History of Present Illness: The patient is a 80 y.o. female who presents with 4 days of illness beginning with fatigue and weakness following her 89th birthday party  She then developed diarrhea without blood   She developed fever last pm and this am was seen in office wwith 103 degree  She has lost 6 lb  She had rales in both bases though not dyspneic or coughing at the time  No dysuria but has some chest pain in right shoulder   No specific ill contacts but many exposures to people in [place]    Past Medical History:        Diagnosis Date    Abnormal echocardiogram 5/15/12    EF >55%. mild diastolic dysfunction. RVSP 70 mmHg on echocardiogram. the right ventricle is moderately to severely enlarged with preserved function. Severe tricuspid regurgitation    Abnormal resting ECG findings 6/19/12    sinus bradycardia 55 beats per minute. Incomplete right bundle branch block. Evidence of left atrial enlargement. Abnormal ECG a    Allergic rhinitis 4/17/2017    Anxiety     ASHD (arteriosclerotic heart disease) 8/1997    CA 50-60%    ASHD (arteriosclerotic heart disease) 08/97    CA 50-60%    Elevated liver enzymes 1996    GERD (gastroesophageal reflux disease)     H/O echocardiogram 10/13/15    EF:60%. Mild mitral regurgitation. Moderate to severe tricuspid reguritation. Moderately dilated right atrium and right ventricle systolic pressure of 57 mmHg. Evidence of mild (Grade 1) diastolic dysfunction.  Hiatal hernia     History of echocardiogram 9/27/14    Compared to the previous study of 4/18/13, the patients estimated RVSP has decreased from >75 mmHg to 35mmHg    History of stress test 10/26/15    Probably normal. EF >70%.  Significant electrocardiographic  evidence of MI during EKG monitoring without significant associated arrhythmias. Max ST depression was 1.4 mm in lead ll. Low risk for significant CAD.  Hyperglycemia 2008    Hyperlipidemia 1992    Hypertension 1991    Hyperthyroidism     Kidney stone     Menopause age 36    Osteoarthritis     lumbar    PAH (pulmonary artery hypertension) 7/5/2012    RHC: Mean PA 26, PCWP 12. Echo 6/12: RVSP 70    Pulmonary hypertension 5/15/12    RVSP 70 mmHg on echocardiogram. the right ventricle is moderately to severely enlarged with preserved function. Severe tricuspid regurgitation.  Status post right heart catheterization 7/5/2012    RHC: Mean PA 26, PCWP 12.    Tricuspid regurgitation     MR 4/2002 ATB proph    Type II or unspecified type diabetes mellitus without mention of complication, not stated as uncontrolled 2/12/2015    Unspecified hypothyroidism 2/12/2015       Past Surgical History:        Procedure Laterality Date    CARDIAC CATHETERIZATION Left 8/1997    Dr. Kelley Padilla  10/2015    Dr. Ely Yung Bilateral 10/2014    Cataracts Surgery       Medications Prior to Admission:    Prior to Admission medications    Medication Sig Start Date End Date Taking? Authorizing Provider   furosemide (LASIX) 20 MG tablet TAKE ONE TABLET BY MOUTH DAILY 6/19/17  Yes Oliva Calvo MD   omeprazole (PRILOSEC) 40 MG delayed release capsule TAKE ONE CAPSULE BY MOUTH DAILY 6/19/17  Yes Oliva Calvo MD   amLODIPine (NORVASC) 5 MG tablet Take 1 tablet by mouth daily 5/10/17  Yes Nakia Arboleda MD   losartan (COZAAR) 100 MG tablet TAKE 1 TABLET BY MOUTH DAILY.  3/6/17  Yes Nakia Arboleda MD   Tadalafil, PAH, (ADCIRCA) 20 MG tablet Take 2 tablets by mouth daily Can not take nitrates with this drug 2/28/17  Yes Nakia Arboleda MD   atorvastatin (LIPITOR) 40 MG tablet TAKE 1 TABLET EVERY DAY 11/11/16  Yes Oliva Calvo MD   carvedilol (COREG) 25 MG tablet Take 1 tablet by mouth 2 times daily (with meals) 11/9/16  Yes Candida Grajeda

## 2017-10-28 PROCEDURE — 1200000000 HC SEMI PRIVATE

## 2017-10-28 PROCEDURE — 6360000002 HC RX W HCPCS: Performed by: FAMILY MEDICINE

## 2017-10-28 PROCEDURE — 2580000003 HC RX 258: Performed by: FAMILY MEDICINE

## 2017-10-28 PROCEDURE — 6370000000 HC RX 637 (ALT 250 FOR IP): Performed by: FAMILY MEDICINE

## 2017-10-28 RX ORDER — ROPINIROLE 0.25 MG/1
0.5 TABLET, FILM COATED ORAL NIGHTLY
Status: DISCONTINUED | OUTPATIENT
Start: 2017-10-28 | End: 2017-10-30 | Stop reason: HOSPADM

## 2017-10-28 RX ORDER — FUROSEMIDE 20 MG/1
20 TABLET ORAL DAILY
Status: DISCONTINUED | OUTPATIENT
Start: 2017-10-28 | End: 2017-10-30 | Stop reason: HOSPADM

## 2017-10-28 RX ORDER — CARVEDILOL 25 MG/1
25 TABLET ORAL 2 TIMES DAILY WITH MEALS
Status: DISCONTINUED | OUTPATIENT
Start: 2017-10-28 | End: 2017-10-30 | Stop reason: HOSPADM

## 2017-10-28 RX ORDER — LEVOTHYROXINE SODIUM 0.1 MG/1
100 TABLET ORAL DAILY
Status: DISCONTINUED | OUTPATIENT
Start: 2017-10-28 | End: 2017-10-30 | Stop reason: HOSPADM

## 2017-10-28 RX ORDER — ATORVASTATIN CALCIUM 40 MG/1
40 TABLET, FILM COATED ORAL NIGHTLY
Status: DISCONTINUED | OUTPATIENT
Start: 2017-10-28 | End: 2017-10-30 | Stop reason: HOSPADM

## 2017-10-28 RX ORDER — CALCIUM CARBONATE 200(500)MG
1 TABLET,CHEWABLE ORAL DAILY PRN
Status: DISCONTINUED | OUTPATIENT
Start: 2017-10-28 | End: 2017-10-30 | Stop reason: HOSPADM

## 2017-10-28 RX ORDER — AMLODIPINE BESYLATE 5 MG/1
5 TABLET ORAL DAILY
Status: DISCONTINUED | OUTPATIENT
Start: 2017-10-28 | End: 2017-10-30 | Stop reason: HOSPADM

## 2017-10-28 RX ORDER — LOSARTAN POTASSIUM 50 MG/1
100 TABLET ORAL DAILY
Status: DISCONTINUED | OUTPATIENT
Start: 2017-10-28 | End: 2017-10-30 | Stop reason: HOSPADM

## 2017-10-28 RX ORDER — FERROUS SULFATE 325(65) MG
325 TABLET ORAL 2 TIMES DAILY WITH MEALS
Status: DISCONTINUED | OUTPATIENT
Start: 2017-10-28 | End: 2017-10-30 | Stop reason: HOSPADM

## 2017-10-28 RX ADMIN — FERROUS SULFATE TAB 325 MG (65 MG ELEMENTAL FE) 325 MG: 325 (65 FE) TAB at 16:01

## 2017-10-28 RX ADMIN — LEVOTHYROXINE SODIUM 100 MCG: 100 TABLET ORAL at 16:04

## 2017-10-28 RX ADMIN — CEFTRIAXONE 1 G: 1 INJECTION, POWDER, FOR SOLUTION INTRAMUSCULAR; INTRAVENOUS at 12:36

## 2017-10-28 RX ADMIN — AMLODIPINE BESYLATE 5 MG: 5 TABLET ORAL at 16:04

## 2017-10-28 RX ADMIN — ATORVASTATIN CALCIUM 40 MG: 40 TABLET, FILM COATED ORAL at 19:58

## 2017-10-28 RX ADMIN — ENOXAPARIN SODIUM 40 MG: 40 INJECTION SUBCUTANEOUS at 09:25

## 2017-10-28 RX ADMIN — FAMOTIDINE 20 MG: 20 TABLET, FILM COATED ORAL at 09:21

## 2017-10-28 RX ADMIN — CARVEDILOL 25 MG: 25 TABLET, FILM COATED ORAL at 16:01

## 2017-10-28 RX ADMIN — AZITHROMYCIN MONOHYDRATE 500 MG: 500 INJECTION, POWDER, LYOPHILIZED, FOR SOLUTION INTRAVENOUS at 14:13

## 2017-10-28 RX ADMIN — LOSARTAN POTASSIUM 100 MG: 50 TABLET ORAL at 16:05

## 2017-10-28 RX ADMIN — TADALAFIL 40 MG: 20 TABLET ORAL at 09:22

## 2017-10-28 RX ADMIN — FUROSEMIDE 20 MG: 20 TABLET ORAL at 16:04

## 2017-10-28 RX ADMIN — SODIUM CHLORIDE: 9 INJECTION, SOLUTION INTRAVENOUS at 19:57

## 2017-10-28 RX ADMIN — SODIUM CHLORIDE: 9 INJECTION, SOLUTION INTRAVENOUS at 04:05

## 2017-10-28 RX ADMIN — ROPINIROLE HYDROCHLORIDE 0.5 MG: 0.25 TABLET, FILM COATED ORAL at 19:58

## 2017-10-28 NOTE — PROGRESS NOTES
Progress Note    SUBJECTIVE:  Feels better  But still weak   No nausea but only fair appetite  No further diarrhea      OBJECTIVE:    Vitals:   Vitals:    10/28/17 0745   BP: (!) 148/79   Pulse: 79   Resp: 18   Temp: 98.7 °F (37.1 °C)   SpO2: 94%     Weight: 125 lb (56.7 kg)   Height: 5' 2\" (157.5 cm)   -----------------------------------------------------------------  Exam:    CONSTITUTIONAL:  awake and alert  EYES:  lids and lashes normal and pupils equal, round and reactive to light  ENT:  normocepalic, without obvious abnormality  NECK:  supple, symmetrical, trachea midline  HEMATOLOGIC/LYMPHATICS:  no supraclavicular lymphadenopathy  BACK:  symmetric  LUNGS:  no increased work of breathing and crackles right base and left base  CARDIOVASCULAR:  normal apical pulses, regular rate and rhythm and normal S1 and S2  ABDOMEN:  No scars, normal bowel sounds, soft, non-distended, non-tender, no masses palpated, no hepatosplenomegally    MUSCULOSKELETAL:  there is no redness, warmth, or swelling of the joints  NEUROLOGIC:  nonfocal  SKIN:  no bruising or bleeding  EXT:     no cyanosis, clubbing or edema present    -----------------------------------------------------------------  Diagnostic Data: Reviewed    ASSESSMENT:   Principal Problem:    Community acquired pneumonia of left lower lobe of lung ([address]  Active Problems:    Hiatal hernia    ASHD (arteriosclerotic heart disease)    Primary pulmonary hypertension (HCC)    Essential hypertension    Type 2 diabetes mellitus without complication (HCC)    Pulmonary HTN    Urinary tract infection without hematuria    Dehydration        PLAN:  · We will continue antibiotics and fluids         Jose Alfonso M.D.

## 2017-10-28 NOTE — PLAN OF CARE
Problem: Falls - Risk of  Goal: Absence of falls  Outcome: Ongoing  Bed alarm on. Bed in low position and wheels locked. Call light in reach. Falling star posted on door. Yellow bracelet on. Non skid socks on. Side rails up. Will continue to assess. Problem: Daily Care:  Goal: Daily care needs are met  Daily care needs are met   Outcome: Ongoing  Patient's daily cares and needs are being assessed each shift. Consideration is given according to patient's ability to assist with ADL's, and hourly rounding consists of asking patient if any help is needed. Will continue to monitor. Problem: Pain:  Goal: Patient's pain/discomfort is manageable  Patient's pain/discomfort is manageable   Outcome: Ongoing  Patient's pain level has been assessed using the 1-10 scale and medication has been administered as ordered depending on stated pain level. Pain rating and characteristics are being charted to track progress and reassessment of pain is being assessed. Will continue to monitor. Problem: Skin Integrity:  Goal: Skin integrity will stabilize  Skin integrity will stabilize   Outcome: Ongoing  Patient's skin condition is being assessed each shift and changes are being charted. Pillows are being used to relieve bony prominences and patient is being reminded to turn frequently to help maintain integrity of skin. Heels are also being elevated when patient is in bed. Will continue to monitor.

## 2017-10-29 PROBLEM — R53.81 MALAISE: Status: RESOLVED | Noted: 2017-07-14 | Resolved: 2017-10-29

## 2017-10-29 PROBLEM — J18.9 PNEUMONIA OF BOTH LUNGS DUE TO INFECTIOUS ORGANISM: Status: RESOLVED | Noted: 2017-10-27 | Resolved: 2017-10-29

## 2017-10-29 PROBLEM — J18.9 COMMUNITY ACQUIRED PNEUMONIA OF LEFT LOWER LOBE OF LUNG: Status: RESOLVED | Noted: 2017-10-27 | Resolved: 2017-10-29

## 2017-10-29 LAB
ABSOLUTE EOS #: 0.73 K/UL (ref 0–0.4)
ABSOLUTE IMMATURE GRANULOCYTE: ABNORMAL K/UL (ref 0–0.3)
ABSOLUTE LYMPH #: 1.54 K/UL (ref 1–4.8)
ABSOLUTE MONO #: 0.57 K/UL (ref 0–1)
ANION GAP SERPL CALCULATED.3IONS-SCNC: 13 MMOL/L (ref 9–17)
BASOPHILS # BLD: 0 %
BASOPHILS ABSOLUTE: 0 K/UL (ref 0–0.2)
BUN BLDV-MCNC: 14 MG/DL (ref 8–23)
BUN/CREAT BLD: 20 (ref 9–20)
CALCIUM SERPL-MCNC: 8.2 MG/DL (ref 8.6–10.4)
CHLORIDE BLD-SCNC: 105 MMOL/L (ref 98–107)
CO2: 23 MMOL/L (ref 20–31)
CREAT SERPL-MCNC: 0.7 MG/DL (ref 0.5–0.9)
CULTURE: ABNORMAL
DIFFERENTIAL TYPE: ABNORMAL
EOSINOPHILS RELATIVE PERCENT: 9 %
GFR AFRICAN AMERICAN: >60 ML/MIN
GFR NON-AFRICAN AMERICAN: >60 ML/MIN
GFR SERPL CREATININE-BSD FRML MDRD: ABNORMAL ML/MIN/{1.73_M2}
GFR SERPL CREATININE-BSD FRML MDRD: ABNORMAL ML/MIN/{1.73_M2}
GLUCOSE BLD-MCNC: 91 MG/DL (ref 70–99)
HCT VFR BLD CALC: 31.8 % (ref 36–46)
HEMOGLOBIN: 10.4 G/DL (ref 12–16)
IMMATURE GRANULOCYTES: ABNORMAL %
LYMPHOCYTES # BLD: 19 %
Lab: ABNORMAL
MCH RBC QN AUTO: 32.7 PG (ref 26–34)
MCHC RBC AUTO-ENTMCNC: 32.8 G/DL (ref 31–37)
MCV RBC AUTO: 99.9 FL (ref 80–100)
MONOCYTES # BLD: 7 %
MORPHOLOGY: NORMAL
ORGANISM: ABNORMAL
PDW BLD-RTO: 16 % (ref 12.1–15.2)
PLATELET # BLD: 147 K/UL (ref 140–450)
PLATELET ESTIMATE: ABNORMAL
PMV BLD AUTO: 9.9 FL (ref 6–12)
POTASSIUM SERPL-SCNC: 3.7 MMOL/L (ref 3.7–5.3)
PROCALCITONIN: 0.24 NG/ML
RBC # BLD: 3.18 M/UL (ref 4–5.2)
RBC # BLD: ABNORMAL 10*6/UL
SEG NEUTROPHILS: 65 %
SEGMENTED NEUTROPHILS ABSOLUTE COUNT: 5.26 K/UL (ref 1.8–7.7)
SODIUM BLD-SCNC: 141 MMOL/L (ref 135–144)
SPECIMEN DESCRIPTION: ABNORMAL
SPECIMEN DESCRIPTION: ABNORMAL
STATUS: ABNORMAL
WBC # BLD: 8.1 K/UL (ref 3.5–11)
WBC # BLD: ABNORMAL 10*3/UL

## 2017-10-29 PROCEDURE — 36415 COLL VENOUS BLD VENIPUNCTURE: CPT

## 2017-10-29 PROCEDURE — 2580000003 HC RX 258: Performed by: FAMILY MEDICINE

## 2017-10-29 PROCEDURE — 84145 PROCALCITONIN (PCT): CPT

## 2017-10-29 PROCEDURE — G8979 MOBILITY GOAL STATUS: HCPCS

## 2017-10-29 PROCEDURE — 6370000000 HC RX 637 (ALT 250 FOR IP): Performed by: FAMILY MEDICINE

## 2017-10-29 PROCEDURE — 1200000000 HC SEMI PRIVATE

## 2017-10-29 PROCEDURE — 80048 BASIC METABOLIC PNL TOTAL CA: CPT

## 2017-10-29 PROCEDURE — 6360000002 HC RX W HCPCS: Performed by: FAMILY MEDICINE

## 2017-10-29 PROCEDURE — G8978 MOBILITY CURRENT STATUS: HCPCS

## 2017-10-29 PROCEDURE — 85025 COMPLETE CBC W/AUTO DIFF WBC: CPT

## 2017-10-29 PROCEDURE — 97161 PT EVAL LOW COMPLEX 20 MIN: CPT

## 2017-10-29 RX ADMIN — LEVOTHYROXINE SODIUM 100 MCG: 100 TABLET ORAL at 07:06

## 2017-10-29 RX ADMIN — Medication 10 ML: at 13:42

## 2017-10-29 RX ADMIN — ENOXAPARIN SODIUM 40 MG: 40 INJECTION SUBCUTANEOUS at 08:47

## 2017-10-29 RX ADMIN — Medication 10 ML: at 20:09

## 2017-10-29 RX ADMIN — FAMOTIDINE 20 MG: 20 TABLET, FILM COATED ORAL at 08:47

## 2017-10-29 RX ADMIN — TADALAFIL 40 MG: 20 TABLET ORAL at 08:46

## 2017-10-29 RX ADMIN — CARVEDILOL 25 MG: 25 TABLET, FILM COATED ORAL at 17:44

## 2017-10-29 RX ADMIN — FUROSEMIDE 20 MG: 20 TABLET ORAL at 08:46

## 2017-10-29 RX ADMIN — ROPINIROLE HYDROCHLORIDE 0.5 MG: 0.25 TABLET, FILM COATED ORAL at 20:08

## 2017-10-29 RX ADMIN — FERROUS SULFATE TAB 325 MG (65 MG ELEMENTAL FE) 325 MG: 325 (65 FE) TAB at 08:47

## 2017-10-29 RX ADMIN — CEFTRIAXONE 1 G: 1 INJECTION, POWDER, FOR SOLUTION INTRAMUSCULAR; INTRAVENOUS at 14:55

## 2017-10-29 RX ADMIN — FERROUS SULFATE TAB 325 MG (65 MG ELEMENTAL FE) 325 MG: 325 (65 FE) TAB at 17:44

## 2017-10-29 RX ADMIN — AMLODIPINE BESYLATE 5 MG: 5 TABLET ORAL at 08:47

## 2017-10-29 RX ADMIN — ATORVASTATIN CALCIUM 40 MG: 40 TABLET, FILM COATED ORAL at 20:08

## 2017-10-29 RX ADMIN — CARVEDILOL 25 MG: 25 TABLET, FILM COATED ORAL at 08:47

## 2017-10-29 RX ADMIN — AZITHROMYCIN MONOHYDRATE 500 MG: 500 INJECTION, POWDER, LYOPHILIZED, FOR SOLUTION INTRAVENOUS at 13:43

## 2017-10-29 RX ADMIN — Medication 10 ML: at 08:49

## 2017-10-29 RX ADMIN — LOSARTAN POTASSIUM 100 MG: 50 TABLET ORAL at 08:46

## 2017-10-29 ASSESSMENT — PAIN SCALES - GENERAL
PAINLEVEL_OUTOF10: 0

## 2017-10-29 NOTE — PLAN OF CARE
Problem: Falls - Risk of  Intervention: Fall risk assessment  Patient at high risk for falls. Intervention: Fall precautions  Fall precautions in place. Educated patient and family on interventions in place. Intervention: Toileting assistance  Ambulated to BR with cane and one assist.    Goal: Absence of falls  Outcome: Ongoing  Patient is alert and oriented and has demonstrated the use of using the call light for assistance before getting up. Education has been provided to defer the use of the bed alarm and/or restraint free alarm as the patient has shown competency in calling for assistance and verbalizing the risk. Problem: Infection:  Goal: Will remain free from infection  Will remain free from infection   Outcome: Ongoing  Patient continues with fine crackles noted in right lower lobe. IV antibiotics continue. Problem: Discharge Planning:  Goal: Patients continuum of care needs are met  Patients continuum of care needs are met   Outcome: Ongoing  Patient plans to return home upon discharge. Denies needs at this time.

## 2017-10-29 NOTE — PLAN OF CARE
Problem: Falls - Risk of  Goal: Absence of falls  Outcome: Ongoing  Patient is alert and oriented and has demonstrated the use of using the call light for assistance before getting up. Education has been provided to defer the use of the bed alarm and/or restraint free alarm as the patient has shown competency in calling for assistance and verbalizing the risk. Problem: Daily Care:  Goal: Daily care needs are met  Daily care needs are met   Outcome: Ongoing  Patient's daily cares and needs are being assessed each shift. Consideration is given according to patient's ability to assist with ADL's, and hourly rounding consists of asking patient if any help is needed. Will continue to monitor. Problem: Pain:  Goal: Patient's pain/discomfort is manageable  Patient's pain/discomfort is manageable   Outcome: Ongoing  Patient's pain level has been assessed using the 1-10 scale and medication has been administered as ordered depending on stated pain level. Pain rating and characteristics are being charted to track progress and reassessment of pain is being assessed. Will continue to monitor. Problem: Skin Integrity:  Goal: Skin integrity will stabilize  Skin integrity will stabilize   Outcome: Ongoing  Patient's skin condition is being assessed each shift and changes are being charted. Pillows are being used to relieve bony prominences and patient is being reminded to turn frequently to help maintain integrity of skin. Heels are also being elevated when patient is in bed. Will continue to monitor.

## 2017-10-29 NOTE — PROGRESS NOTES
Physical Therapy    Facility/Department: [facility] AT [facility] MED SURG  Initial Assessment    NAME: Juan J Meeks  : 10/13/1927  MRN: [id number]    Date of Service: 10/29/2017    Patient Diagnosis(es): There were no encounter diagnoses. has a past medical history of Abnormal echocardiogram; Abnormal resting ECG findings; Allergic rhinitis; Anxiety; ASHD (arteriosclerotic heart disease); ASHD (arteriosclerotic heart disease); Elevated liver enzymes; GERD (gastroesophageal reflux disease); H/O echocardiogram; Hiatal hernia; History of echocardiogram; History of stress test; Hyperglycemia; Hyperlipidemia; Hypertension; Hyperthyroidism; Kidney stone; Menopause; Osteoarthritis; PAH (pulmonary artery hypertension); Pulmonary hypertension; Status post right heart catheterization; Tricuspid regurgitation; Type II or unspecified type diabetes mellitus without mention of complication, not stated as uncontrolled; and Unspecified hypothyroidism. has a past surgical history that includes eye surgery (Bilateral, 10/2014); Cataract removal (10/2015); and Cardiac catheterization (Left, 1997).     Restrictions  Restrictions/Precautions  Restrictions/Precautions: General Precautions, Fall Risk  Vision/Hearing  Vision: Within Functional Limits  Hearing: Within functional limits     Subjective  General  Chart Reviewed: Yes  Patient assessed for rehabilitation services?: Yes  Family / Caregiver Present: Yes  Pain Screening  Patient Currently in Pain: Denies  Vital Signs  Patient Currently in Pain: Denies       Orientation  Orientation  Overall Orientation Status: Within Functional Limits    Social/Functional History  Social/Functional History  Lives With: Alone  Type of Home: House  Home Layout: One level  Home Access: Stairs to enter with rails  Home Equipment: [employer]  ADL Assistance: Independent  Homemaking Assistance: Independent  Ambulation Assistance: Independent  Transfer Assistance: Independent  Additional Comments: patient stated she uses SC all the time at home and is independent with ADLs/cleaning her own home   Objective          PROM RLE (degrees)  RLE PROM: WFL  AROM RLE (degrees)  RLE AROM: WFL  PROM LLE (degrees)  LLE PROM: WFL  AROM LLE (degrees)  LLE AROM : WFL  Strength RLE  Comment: grossly 4-/5  Strength LLE  Comment: grossly 4-/5            Transfers  Sit to Stand: Stand by assistance  Stand to sit: Stand by assistance  Ambulation  Ambulation?: Yes  Ambulation 1  Surface: level tile  Device: Single point cane  Assistance: Contact guard assistance  Quality of Gait: patient demonstrated reduced yelena and slight unsteadiness with change in directions   Distance: 50ftx1      Balance  Posture: Fair  Sitting - Static: Good  Sitting - Dynamic: Good  Standing - Static: Fair  Standing - Dynamic: Fair;-        Assessment   Body structures, Functions, Activity limitations: Decreased functional mobility ; Decreased strength;Decreased endurance;Decreased balance;Decreased coordination  Treatment Diagnosis: general debility   Prognosis: Good  Decision Making: Low Complexity  Patient Education: educated patient on POC with good understanding   REQUIRES PT FOLLOW UP: Yes  Activity Tolerance  Activity Tolerance: Patient Tolerated treatment well     Discharge Recommendations:  Continue to assess pending progress      Plan   Plan  Times per week: 7x/week   Times per day: Twice a day  Current Treatment Recommendations: Strengthening, Balance Training, Functional Mobility Training, Transfer Training, Endurance Training, Gait Training, Stair training, Neuromuscular Re-education, Home Exercise Program, Safety Education & Training, Patient/Caregiver Education & Training, Equipment Evaluation, Education, & procurement  Safety Devices  Type of devices: Left in chair, Call light within reach (pt did not have chair alarm on prior to therapist arriving )    G-Code  PT G-Codes  Functional Limitation: Mobility: Walking and moving around  Mobility: Walking and Moving Around Current Status (): At least 40 percent but less than 60 percent impaired, limited or restricted  Mobility: Walking and Moving Around Goal Status ():  At least 20 percent but less than 40 percent impaired, limited or restricted  Goals  Short term goals  Time Frame for Short term goals: 14 visits   Short term goal 1: Patient will demonstrate the ability to ambulate [id number] with SC with SBAx1 in order to safely navigate the facility   Short term goal 2: Patient will demonstrate the ability to ascend/descend 3-4 steps with SBAx1 and use of HR in order to safely enter/exit the home   Short term goal 3: Patient will demonstrate B LE strength grossly 4+/5 in order to ease ambulation   Short term goal 4: Patient will demonstrate modified independence with bed mobility and transfers in order to ease ADLs        Therapy Time   Individual Concurrent Group Co-treatment   Time In 1118         Time Out 1130         Minutes 12                 Cheryl Tucker, PT, DPT

## 2017-10-29 NOTE — PROGRESS NOTES
Progress Note    SUBJECTIVE:  Pt is feeling better and eating bnetter   Ambulating ok  No diarrhea  No coughj    OBJECTIVE:    Vitals:   Vitals:    10/29/17 0659   BP: (!) 148/79   Pulse: 72   Resp: 20   Temp: 99 °F (37.2 °C)   SpO2: 92%     Weight: 124 lb (56.2 kg)   Height: 5' 2\" (157.5 cm)   -----------------------------------------------------------------  Exam:    CONSTITUTIONAL:  awake and alert  EYES:  Lids and lashes normal, pupils equal, round and reactive to light, extra ocular muscles intact, sclera clear, conjunctiva normal  ENT:  normocepalic, without obvious abnormality  NECK:  supple, symmetrical, trachea midline  HEMATOLOGIC/LYMPHATICS:  no cervical lymphadenopathy  BACK:  symmetric  LUNGS:  No increased work of breathing, good air exchange, clear to auscultation bilaterally, no crackles or wheezing  CARDIOVASCULAR:  normal apical pulses, regular rate and rhythm and normal S1 and S2  ABDOMEN:  No scars, normal bowel sounds, soft, non-distended, non-tender, no masses palpated, no hepatosplenomegally    MUSCULOSKELETAL:  there is no redness, warmth, or swelling of the joints  NEUROLOGIC:  nonfoval  SKIN:  no bruising or bleeding  EXT:     no cyanosis, clubbing or edema present    -----------------------------------------------------------------  Diagnostic Data: Reviewed    ASSESSMENT:   Principal Problem:    Urinary tract infection without hematuria  Active Problems:    Hiatal hernia    ASHD (arteriosclerotic heart disease)    Primary pulmonary hypertension (HCC)    Essential hypertension    Type 2 diabetes mellitus without complication (HCC)    Pulmonary HTN    Dehydration        PLAN:  · It appears at this time her primary problem was and is UTI  The initial exam findings  Likely were due to atelectasis  Her UTI is responding to rocephin  I will wait for sensitivities to plan discharge      Liu Valiente M.D.

## 2017-10-30 VITALS
TEMPERATURE: 97.9 F | HEART RATE: 78 BPM | BODY MASS INDEX: 22.82 KG/M2 | RESPIRATION RATE: 14 BRPM | OXYGEN SATURATION: 94 % | DIASTOLIC BLOOD PRESSURE: 66 MMHG | HEIGHT: 62 IN | WEIGHT: 124 LBS | SYSTOLIC BLOOD PRESSURE: 141 MMHG

## 2017-10-30 PROBLEM — A41.9 SEPSIS (HCC): Status: ACTIVE | Noted: 2017-10-27

## 2017-10-30 PROCEDURE — 90686 IIV4 VACC NO PRSV 0.5 ML IM: CPT | Performed by: FAMILY MEDICINE

## 2017-10-30 PROCEDURE — 97110 THERAPEUTIC EXERCISES: CPT

## 2017-10-30 PROCEDURE — 97116 GAIT TRAINING THERAPY: CPT

## 2017-10-30 PROCEDURE — G0008 ADMIN INFLUENZA VIRUS VAC: HCPCS | Performed by: FAMILY MEDICINE

## 2017-10-30 PROCEDURE — 6370000000 HC RX 637 (ALT 250 FOR IP): Performed by: FAMILY MEDICINE

## 2017-10-30 PROCEDURE — 6360000002 HC RX W HCPCS: Performed by: FAMILY MEDICINE

## 2017-10-30 RX ORDER — CIPROFLOXACIN 500 MG/1
500 TABLET, FILM COATED ORAL 2 TIMES DAILY
Qty: 5 TABLET | Refills: 0 | Status: SHIPPED | OUTPATIENT
Start: 2017-10-30 | End: 2017-11-09

## 2017-10-30 RX ADMIN — CARVEDILOL 25 MG: 25 TABLET, FILM COATED ORAL at 09:22

## 2017-10-30 RX ADMIN — LOSARTAN POTASSIUM 100 MG: 50 TABLET ORAL at 09:23

## 2017-10-30 RX ADMIN — FUROSEMIDE 20 MG: 20 TABLET ORAL at 09:23

## 2017-10-30 RX ADMIN — FAMOTIDINE 20 MG: 20 TABLET, FILM COATED ORAL at 09:23

## 2017-10-30 RX ADMIN — INFLUENZA VIRUS VACCINE 0.5 ML: 15; 15; 15; 15 SUSPENSION INTRAMUSCULAR at 09:30

## 2017-10-30 RX ADMIN — AMLODIPINE BESYLATE 5 MG: 5 TABLET ORAL at 09:23

## 2017-10-30 RX ADMIN — FERROUS SULFATE TAB 325 MG (65 MG ELEMENTAL FE) 325 MG: 325 (65 FE) TAB at 09:22

## 2017-10-30 RX ADMIN — ENOXAPARIN SODIUM 40 MG: 40 INJECTION SUBCUTANEOUS at 09:23

## 2017-10-30 RX ADMIN — TADALAFIL 40 MG: 20 TABLET ORAL at 09:22

## 2017-10-30 RX ADMIN — LEVOTHYROXINE SODIUM 100 MCG: 100 TABLET ORAL at 06:56

## 2017-10-30 ASSESSMENT — PAIN SCALES - GENERAL
PAINLEVEL_OUTOF10: 0
PAINLEVEL_OUTOF10: 0

## 2017-10-30 NOTE — PROGRESS NOTES
Hospitalist Progress Note    SUBJECTIVE/INTERVAL HISTORY:    Patient seen in follow up for sepsis and UTI. She states she is feeling \"100%\" better. Denies any SOB and chest pain. Eating and drinking well. OBJECTIVE:    Vitals:   Temp: 97.9 °F (36.6 °C)  BP: (!) 141/66  Resp: 14  Pulse: 78  SpO2: 94 %  24HR INTAKE/OUTPUT:      Intake/Output Summary (Last 24 hours) at 10/30/17 0893  Last data filed at 10/30/17 0516   Gross per 24 hour   Intake             2262 ml   Output             1400 ml   Net              862 ml       -----------------------------------------------------------------  Review of Systems:  Constitutional:negative  for fevers, and negative for chills. Eyes: negative for visual disturbance   ENT: negative for sore throat, negative nasal congestion, and negative for earache  Respiratory: negative for shortness of breath, negative for cough, and negative for wheezing  Cardiovascular: negative for chest pain, negative for palpitations, and negative for syncope  Gastrointestinal: negative for abdominal pain, negative for nausea,negative for vomiting, negative for diarrhea, negative for constipation, and negative for hematochezia or melena  Genitourinary: negative for dysuria, negative for urinary urgency, negative for urinary frequency, and negative for hematuria  Skin: negative for skin rash, and negative for skin lesions  Neurological: negative for unilateral weakness, numbness or tingling.     Exam:  GEN:  alert and oriented to person, place and time, well-developed and well-nourished, in no acute distress  EYES: PERRL  NECK: normal, supple,  no carotid bruits  PULM: clear to auscultation bilaterally- no wheezes, rales or rhonchi, normal air movement, no respiratory distress  COR: regular rate & rhythm and systolic murmur 4/6  ABD:  soft, non-tender, non-distended, normal bowel sounds, no masses or organomegaly  EXT:   no cyanosis, clubbing or edema present    NEURO: follows commands, [place], no

## 2017-10-30 NOTE — PROGRESS NOTES
Physical Therapy  Facility/Department: [facility] AT THE [facility] MED SURG  Daily Treatment Note  NAME: Tres Seo  : 10/13/1927  MRN: [id number]    Date of Service: 10/30/2017    Patient Diagnosis(es):   Patient Active Problem List    Diagnosis Date Noted    Anemia due to GI blood loss 2017     Priority: High    Chronic disease anemia 2015     Priority: High    Urinary tract infection without hematuria 10/27/2017    Dehydration 10/27/2017    Sepsis ([facility] 10/27/2017    Type 2 diabetes mellitus without complication ([facility]     Pulmonary HTN 10/02/2017    PAC (premature atrial contraction) 05/10/2017    Diverticulosis of intestine with bleeding - likely source 2017    Iron deficiency anemia 2017    Allergic rhinitis 2017    Primary pulmonary hypertension ([facility] [address] 2017    Essential hypertension 2017    Urinary tract infection without hematuria 2016    [facility] diastolic [facility] failure) [facility] 2016    Abnormal cardiovascular stress test 2015    Degeneration of lumbar or lumbosacral intervertebral disc 2015    Vitamin D deficiency 2015    Hypothyroidism 2015    Hyperlipidemia     Osteoarthritis     GERD (gastroesophageal reflux disease)     Hiatal hernia     Elevated liver enzymes     Tricuspid regurgitation     Anxiety     Orthostatic hypotension 10/16/2012    Abnormal echocardiogram 05/15/2012    ASHD (arteriosclerotic heart disease) 1997       Past Medical History:   Diagnosis Date    Abnormal echocardiogram 5/15/12    EF >55%. mild diastolic dysfunction. RVSP 70 mmHg on echocardiogram. the right ventricle is moderately to severely enlarged with preserved function. Severe tricuspid regurgitation    Abnormal resting ECG findings 12    sinus bradycardia 55 beats per minute. Incomplete right bundle branch block. Evidence of left atrial enlargement.  Abnormal ECG a    Allergic rhinitis 2017    Anxiety Yes  Subjective  Subjective: Pt reported no pain. Pain Screening  Patient Currently in Pain: Denies  Pain Assessment  Pain Assessment: 0-10  Pain Level: 0  Vital Signs  Patient Currently in Pain: Denies       Orientation  Orientation  Overall Orientation Status: Within Functional Limits  Objective      Transfers  Sit to Stand: Stand by assistance  Stand to sit: Stand by assistance  Ambulation  Ambulation?: Yes  Ambulation 1  Surface: level tile  Device: Single point cane  Assistance: Contact guard assistance  Distance: 200 feet x 1  Comments: Minimal cues for safety with AD. Stairs/Curb  Stairs?: No     Balance  Posture: Fair  Sitting - Static: Good  Sitting - Dynamic: Good  Standing - Static: Fair;+  Standing - Dynamic: Fair  Exercises  Hip Flexion: 20x  Hip Abduction: 20x  Knee Long Arc Quad: 20x  Ankle Pumps: 20x  Comments: Above exercises completed in sitting. Assessment      Patient Education: Educated pt on discharge folder. Pt verbalized good understanding.    REQUIRES PT FOLLOW UP: Yes  Activity Tolerance  Activity Tolerance: Patient Tolerated treatment well       Discharge Recommendations:  Continue to assess pending progress    G-Code     OutComes Score    Goals  Short term goals  Time Frame for Short term goals: 14 visits   Short term goal 1: Patient will demonstrate the ability to ambulate [id number] with SC with SBAx1 in order to safely navigate the facility   Short term goal 2: Patient will demonstrate the ability to ascend/descend 3-4 steps with SBAx1 and use of HR in order to safely enter/exit the home   Short term goal 3: Patient will demonstrate B LE strength grossly 4+/5 in order to ease ambulation   Short term goal 4: Patient will demonstrate modified independence with bed mobility and transfers in order to ease ADLs     Plan    Plan  Times per week: 7x/week   Times per day: Twice a day  Current Treatment Recommendations: Strengthening, Balance Training, Functional Mobility Training,

## 2017-10-30 NOTE — PROGRESS NOTES
Discharge instructions reviewed with pt and daughter at bedside. Both verbalized understanding using teach back method. Pt discharged home with daughter in private car.

## 2017-10-30 NOTE — PLAN OF CARE
Problem: Falls - Risk of  Goal: Absence of falls  Outcome: Ongoing  Patient is alert and oriented and has demonstrated the use of using the call light for assistance before getting up. Education has been provided to defer the use of the bed alarm and/or restraint free alarm as the patient has shown competency in calling for assistance and verbalizing the risk. Problem: Discharge Planning:  Goal: Patients continuum of care needs are met  Patients continuum of care needs are met   Outcome: Ongoing  Discussed plan of discharge tomorrow (potentially) with patient. Patient expressed understanding of some limitation she may face at home until she completely recovers since she lives alone. Problem: Musculor/Skeletal Functional Status  Goal: Highest potential functional level  Outcome: Ongoing  Patient ambulates in her room/bathroom with no reported problems Patient does not use any assistive devices at home at this time and has not requested any help with obtaining devices.

## 2017-10-30 NOTE — CARE COORDINATION
Offered to make follow up appointment with Dr. Jack Peterson. States would prefer that daughter make the appointment since she takes her to her appointments and will need to know her schedule.    Hayes Can RN

## 2017-10-30 NOTE — DISCHARGE SUMMARY
20 10/27/2017    RBCUA 0 TO 2 10/27/2017    EPITHUA 0 TO 2 10/27/2017    LEUKOCYTESUR TRACE (A) 10/27/2017    SPECGRAV 1.015 10/27/2017    GLUCOSEU NEGATIVE 10/27/2017    KETUA TRACE (A) 10/27/2017    PROTEINU NEGATIVE 10/27/2017    HGBUR TRACE (A) 10/27/2017    CASTUA NOT REPORTED 10/27/2017    CRYSTUA NOT REPORTED 10/27/2017    BACTERIA 2+ (A) 10/27/2017    YEAST NOT REPORTED 10/27/2017       Xr Chest Standard (2 Vw)    Result Date: 10/27/2017  REPORT: Chest PA and lateral INDICATION: Pneumonia, cough FINDINGS: Compared to 10/29/2014. Again seen is a very large hiatal hernia. No focal consolidation, pleural effusion or pneumothorax seen. Stable mild cardiac enlargement. No free intraperitoneal air. Degenerative changes and osteopenia thoracic spine.  Final report electronically signed by Oswaldo Freeman on 10/27/2017 2:44 PM    Very large hiatal hernia      Discharge Diagnoses:    Principal Problem:    Urinary tract infection without hematuria  Active Problems:    Hiatal hernia    ASHD (arteriosclerotic heart disease)    Primary pulmonary hypertension ([address]    Essential hypertension    Type 2 diabetes mellitus without complication ([address]    Pulmonary HTN    Dehydration    Sepsis ([address]      Active Hospital Problems    Diagnosis Date Noted    Urinary tract infection without hematuria [N39.0] 10/27/2017    Dehydration [E86.0] 10/27/2017    Sepsis ([address] [A41.9] 10/27/2017    Type 2 diabetes mellitus without complication ([address] [G22.3] 10/02/2017    Pulmonary HTN [I27.20] 10/02/2017    Primary pulmonary hypertension ([address] [I27.0] 01/30/2017    Essential hypertension [I10] 01/30/2017    Hiatal hernia [K44.9]     ASHD (arteriosclerotic heart disease) [I25.10] 08/01/1997       Discharge Medications:       [address] Medication Instructions ROSALVA:[id number]    Printed on:10/30/17 0850   Medication Information                      amLODIPine (NORVASC) 5 MG tablet  Take 1 tablet by mouth daily atorvastatin (LIPITOR) 40 MG tablet  TAKE 1 TABLET EVERY DAY             calcium carbonate (TUMS) 500 MG chewable tablet  Take 1 tablet by mouth as needed              carvedilol (COREG) 25 MG tablet  Take 1 tablet by mouth 2 times daily (with meals)             ciprofloxacin (CIPRO) 500 MG tablet  Take 1 tablet by mouth 2 times daily for 10 days             ferrous sulfate 325 (65 FE) MG tablet  Take 1 tablet by mouth 2 times daily (with meals)             furosemide (LASIX) 20 MG tablet  TAKE ONE TABLET BY MOUTH DAILY             levothyroxine (SYNTHROID) 100 MCG tablet  Take 1 tablet by mouth Daily             losartan (COZAAR) 100 MG tablet  TAKE 1 TABLET BY MOUTH DAILY. omeprazole (PRILOSEC) 40 MG delayed release capsule  TAKE ONE CAPSULE BY MOUTH DAILY             rOPINIRole (REQUIP) 0.5 MG tablet  TAKE 1 TABLET BY MOUTH NIGHTLY             Tadalafil, PAH, (ADCIRCA) 20 MG tablet  Take 2 tablets by mouth daily Can not take nitrates with this drug                 Patient Instructions:    Activity: activity as tolerated  Diet: regular diet  Wound Care: none needed  Other: abx     Disposition:   Discharge to Home    Follow up:  Patient will be followed by Elfego Gill MD this week    CORE MEASURES on Discharge (if applicable)  ACE/ARB in CHF: NA  Statin in MI: NA  ASA in MI: NA  Statin in CVA: NA  Antiplatelet in CVA: NA    Total time spent on discharge services: 25 minutes    Including the following activities:  Evaluation and Management of patient  Discussion with patient and/or surrogate about current care plan  Coordination with Case Management and/or   Coordination of care with Consultants (if applicable)   Coordination of care with Receiving Facility Physician (if applicable)  Completion of DME forms (if applicable)  Preparation of Discharge Summary  Preparation of Medication Reconciliation  Preparation of Discharge Prescriptions    Signed:  Richa Gardner PA-C  10/30/2017, 8:50 AM

## 2017-10-31 ENCOUNTER — CARE COORDINATION (OUTPATIENT)
Dept: CASE MANAGEMENT | Age: 82
End: 2017-10-31

## 2017-10-31 NOTE — CARE COORDINATION
[address] Transitions Initial Follow Up Call    Call within 2 business days of discharge: Yes    Patient: Thiago Townsend Patient : 10/13/1927   MRN: <[id number]>  Reason for Admission: There are no discharge diagnoses documented for the most recent discharge. Discharge Date: 10/30/17 RARS: [facility] Risk Score: 10.25       Attempted to contact patient for transition call. Unable to reach, left voicemail with contact information for patient to call back.       Care Transitions 24 Hour Call    Care Transitions Interventions         Follow Up  Future Appointments  Date Time Provider Katy Hassan   2017 9:15 AM SCHEDULE, SARAHPMARQUIS IRWIN [facility] MED [facility]   2017 9:00 AM [facility]   2018 9:15 AM [facility]       Kvng Longroia RN

## 2017-11-01 ENCOUNTER — OFFICE VISIT (OUTPATIENT)
Dept: FAMILY MEDICINE CLINIC | Age: 82
End: 2017-11-01
Payer: MEDICARE

## 2017-11-01 VITALS
BODY MASS INDEX: 23.74 KG/M2 | HEIGHT: 62 IN | WEIGHT: 129 LBS | DIASTOLIC BLOOD PRESSURE: 80 MMHG | SYSTOLIC BLOOD PRESSURE: 158 MMHG

## 2017-11-01 DIAGNOSIS — N39.0 URINARY TRACT INFECTION WITHOUT HEMATURIA, SITE UNSPECIFIED: ICD-10-CM

## 2017-11-01 DIAGNOSIS — A41.9 SEPSIS, DUE TO UNSPECIFIED ORGANISM: Primary | ICD-10-CM

## 2017-11-01 DIAGNOSIS — Z09 HOSPITAL DISCHARGE FOLLOW-UP: ICD-10-CM

## 2017-11-01 DIAGNOSIS — I27.20 PULMONARY HYPERTENSION (HCC): ICD-10-CM

## 2017-11-01 LAB
CULTURE: NORMAL
Lab: NORMAL
Lab: NORMAL
SPECIMEN DESCRIPTION: NORMAL
SPECIMEN DESCRIPTION: NORMAL
STATUS: NORMAL
STATUS: NORMAL

## 2017-11-01 PROCEDURE — 81000 URINALYSIS NONAUTO W/SCOPE: CPT | Performed by: FAMILY MEDICINE

## 2017-11-01 PROCEDURE — 99496 TRANSJ CARE MGMT HIGH F2F 7D: CPT | Performed by: FAMILY MEDICINE

## 2017-11-01 RX ORDER — ATORVASTATIN CALCIUM 40 MG/1
TABLET, FILM COATED ORAL
Qty: 90 TABLET | Refills: 3 | Status: SHIPPED | OUTPATIENT
Start: 2017-11-01 | End: 2017-11-16 | Stop reason: SDUPTHER

## 2017-11-01 NOTE — PROGRESS NOTES
(LIPITOR) 40 MG tablet TAKE 1 TABLET EVERY DAY 11/11/16     Jackeline Villanueva MD   carvedilol (COREG) 25 MG tablet Take 1 tablet by mouth 2 times daily (with meals) 11/9/16     Khari Vargas MD   levothyroxine (SYNTHROID) 100 MCG tablet Take 1 tablet by mouth Daily 11/1/16     Jackeline Villanueva MD   rOPINIRole (REQUIP) 0.5 MG tablet TAKE 1 TABLET BY MOUTH NIGHTLY 8/8/16     Jackeline Villanueva MD   ferrous sulfate 325 (65 FE) MG tablet Take 1 tablet by mouth 2 times daily (with meals)  Patient taking differently: Take 325 mg by mouth 2 times daily  11/4/15     Jackeline Villanueva MD   calcium carbonate (TUMS) 500 MG chewable tablet Take 1 tablet by mouth as needed        Historical Provider, MD            ROS:  General Constitutional: Denies chills. Denies fever. Denies headache. Denies lightheadedness. Ophthalmologic: Denies blurred vision. ENT: Denies nasal congestion. Denies sore throat. Denies ear pain and pressure. Respiratory: Denies cough. Denies shortness of breath. Denies wheezing. Cardiovascular: Denies chest pain at rest. Denies irregular heartbeat. Denies palpitations. Gastrointestinal: Denies abdominal pain. Denies blood in the stool. Denies constipation. Denies diarrhea. Denies nausea. Denies vomiting. Genitourinary: Denies blood in the urine. Denies difficulty urinating. Denies frequent urination. Denies painful urination. Denies urinary incontinence  Musculoskeletal: Denies muscle aches. Denies painful joints. Denies swollen joints. Peripheral Vascular: Denies pain/cramping in legs after exertion. Skin: Denies dry skin. Denies itching. Denies rash. Neurologic: Denies falls. Denies dizziness. Denies fainting. Denies tingling/numbness. Psychiatric: Denies sleep disturbance. Denies anxiety.  Denies depressed mood.     Past Surgical History         Past Surgical History:   Procedure Laterality Date    CARDIAC CATHETERIZATION Left 8/1997     Dr. Yuly Cat   10/2015     Dr. Kailee Garcai Osteoarthritis       lumbar    PAH (pulmonary artery hypertension) 7/5/2012     RHC: Mean PA 26, PCWP 12. Echo 6/12: RVSP 70    Pulmonary hypertension 5/15/12     RVSP 70 mmHg on echocardiogram. the right ventricle is moderately to severely enlarged with preserved function. Severe tricuspid regurgitation.  Status post right heart catheterization 7/5/2012     RHC: Mean PA 26, PCWP 12.    Tricuspid regurgitation       MR 4/2002 ATB proph    Type II or unspecified type diabetes mellitus without mention of complication, not stated as uncontrolled 2/12/2015    Unspecified hypothyroidism 2/12/2015              Social History   Substance Use Topics    Smoking status: Never Smoker    Smokeless tobacco: Never Used    Alcohol use No      Current Facility-Administered Medications          Current Outpatient Prescriptions   Medication Sig Dispense Refill    ciprofloxacin (CIPRO) 500 MG tablet Take 1 tablet by mouth 2 times daily for 10 days 5 tablet 0    furosemide (LASIX) 20 MG tablet TAKE ONE TABLET BY MOUTH DAILY 90 tablet 1    omeprazole (PRILOSEC) 40 MG delayed release capsule TAKE ONE CAPSULE BY MOUTH DAILY 90 capsule 1    amLODIPine (NORVASC) 5 MG tablet Take 1 tablet by mouth daily 90 tablet 3    losartan (COZAAR) 100 MG tablet TAKE 1 TABLET BY MOUTH DAILY.  90 tablet 3    Tadalafil, PAH, (ADCIRCA) 20 MG tablet Take 2 tablets by mouth daily Can not take nitrates with this drug 180 tablet 3    atorvastatin (LIPITOR) 40 MG tablet TAKE 1 TABLET EVERY DAY 90 tablet 3    carvedilol (COREG) 25 MG tablet Take 1 tablet by mouth 2 times daily (with meals) 180 tablet 3    levothyroxine (SYNTHROID) 100 MCG tablet Take 1 tablet by mouth Daily 90 tablet 3    rOPINIRole (REQUIP) 0.5 MG tablet TAKE 1 TABLET BY MOUTH NIGHTLY 90 tablet 2    ferrous sulfate 325 (65 FE) MG tablet Take 1 tablet by mouth 2 times daily (with meals) (Patient taking differently: Take 325 mg by mouth 2 times daily ) 30 tablet 3    calcium carbonate (TUMS) 500 MG chewable tablet Take 1 tablet by mouth as needed           No current facility-administered medications for this visit.          No Known Allergies     PHYSICAL EXAM:  Vitals:    11/01/17 1435   BP: (!) 158/80   Weight: 129 lb (58.5 kg)   Height: 5' 2\" (1.575 m)       Ht 5' 2\" (1.575 m)       Wt Readings from Last 3 Encounters:   10/30/17 124 lb (56.2 kg)   10/27/17 126 lb (57.2 kg)   10/02/17 132 lb (59.9 kg)          BP Readings from Last 3 Encounters:   10/30/17 (!) 141/66   10/27/17 122/70   10/02/17 (!) 140/70         General Appearance: in no acute distress, well developed, well nourished. Ambulates with cane. Eyes: pupils equal, round reactive to light and accommodation. Ears: normal canal and TM's. Nose: nares patent, no lesions. Oral Cavity: mucosa moist.  Throat: clear. Neck/Thyroid: neck supple, full range of motion, no cervical lymphadenopathy, no thyromegaly or carotid bruits. Skin: warm and dry. No suspicious lesions. Heart: regular rate and rhythm. S1, S2 normal, no gallops. Bigeminal rhythm [id number] soft 2/6 systolic murmor lower left sternal border. Lungs: clear to auscultation bilaterally. Abdomen: bowel sounds present, soft, nontender, nondistended, no masses or organomegaly. Musculoskeletal: normal, full range of motion in knees and hips, no swelling or tenderness. Extremities: no cyanosis or edema. Peripheral Pulses: 2+ throughout, symetric. Neurologic: nonfocal, motor strength normal upper and lower extremities, sensory exam intact. Psych: normal affect, speech fluent.     ASSESSMENT:  1. Sepsis, due to unspecified organism ([address]     2. Urinary tract infection without hematuria, site unspecified  POC URINE with Microscopic   3. Pulmonary hypertension     4. Hospital discharge follow-up            PLAN:  I will get a urine specimen before she leaves to make sure her infection has resolved.      She already had a flu shot.         Scribed by: Mary Chi SOFYA

## 2017-11-01 NOTE — PROGRESS NOTES
[address] [address]  [place] [id number]  Dept: [phone]      Juan J Meeks is a 80 y.o. female here for Follow-up      HPI:  Pt was in  from 10/27 to 10/30  Her hospital course was summarized in her discharge as follows:  Juan J Meeks is a 80 y.o. female who presented with 4 days of illness beginning with fatigue and weakness following her 89th birthday party. Severo Katayama then developed diarrhea without blood   She developed fever of 103F and she lost 6 lbs. On initial exam she had rales in both bases though not dyspneic or coughing at the time.  No dysuria.  No specific ill contacts but had many exposures to other people. She had an elevated procalcitonin and met sepsis criteria. She was admitted for sepsis and possible PNA. She was started on IV abx and IVF. She was found to have an ecoli and enterococcus UTI. She improved clinically. She will be discharge to home on Cipro. She states that she is still taking her Cipro and feeling much better. He fatigue and weakness has nearly resolved. She is accompanied by her daughter in law    Prior to Admission medications    Medication Sig Start Date End Date Taking? Authorizing Provider   ciprofloxacin (CIPRO) 500 MG tablet Take 1 tablet by mouth 2 times daily for 10 days 10/30/17 11/9/17  Hamida Umanzor PA-C   furosemide (LASIX) 20 MG tablet TAKE ONE TABLET BY MOUTH DAILY 6/19/17   Liu Valiente MD   omeprazole (PRILOSEC) 40 MG delayed release capsule TAKE ONE CAPSULE BY MOUTH DAILY 6/19/17   Liu Valiente MD   amLODIPine (NORVASC) 5 MG tablet Take 1 tablet by mouth daily 5/10/17   Tiffany Nathan MD   losartan (COZAAR) 100 MG tablet TAKE 1 TABLET BY MOUTH DAILY.  3/6/17   Tiffany Nathan MD   Tadalafil, PAH, (ADCIRCA) 20 MG tablet Take 2 tablets by mouth daily Can not take nitrates with this drug 2/28/17   Tiffany Nathan MD   atorvastatin (LIPITOR) 40 MG tablet TAKE 1 TABLET EVERY DAY 11/11/16   Varun Vitale MD   carvedilol (COREG) 25 MG tablet Take 1 tablet by mouth 2 times daily (with meals) 11/9/16   Kayla Dunaway MD   levothyroxine (SYNTHROID) 100 MCG tablet Take 1 tablet by mouth Daily 11/1/16   Varun Vitale MD   rOPINIRole (REQUIP) 0.5 MG tablet TAKE 1 TABLET BY MOUTH NIGHTLY 8/8/16   Varun Vitale MD   ferrous sulfate 325 (65 FE) MG tablet Take 1 tablet by mouth 2 times daily (with meals)  Patient taking differently: Take 325 mg by mouth 2 times daily  11/4/15   Varun Vitale MD   calcium carbonate (TUMS) 500 MG chewable tablet Take 1 tablet by mouth as needed     Historical Provider, MD       ROS:  General Constitutional: Denies chills. Denies fever. Denies headache. Denies lightheadedness. Ophthalmologic: Denies blurred vision. ENT: Denies nasal congestion. Denies sore throat. Denies ear pain and pressure. Respiratory: Denies cough. Denies shortness of breath. Denies wheezing. Cardiovascular: Denies chest pain at rest. Denies irregular heartbeat. Denies palpitations. Gastrointestinal: Denies abdominal pain. Denies blood in the stool. Denies constipation. Denies diarrhea. Denies nausea. Denies vomiting. Genitourinary: Denies blood in the urine. Denies difficulty urinating. Denies frequent urination. Denies painful urination. Denies urinary incontinence  Musculoskeletal: Denies muscle aches. Denies painful joints. Denies swollen joints. Peripheral Vascular: Denies pain/cramping in legs after exertion. Skin: Denies dry skin. Denies itching. Denies rash. Neurologic: Denies falls. Denies dizziness. Denies fainting. Denies tingling/numbness. Psychiatric: Denies sleep disturbance. Denies anxiety. Denies depressed mood.     Past Surgical History:   Procedure Laterality Date    CARDIAC CATHETERIZATION Left 8/1997    Dr. Jose Wolff  10/2015    Dr. Eddie Barber Bilateral 10/2014    Cataracts Surgery       Family History   Problem Relation Age of Onset    High Blood Pressure Mother     Heart Failure Mother     Other Mother      DJD    Stroke Father     Heart Disease Sister     High Blood Pressure Sister     High Cholesterol Sister     Cancer Sister      metastatic endometrial CA, Cause of death    Other Brother      CAD       Past Medical History:   Diagnosis Date    Abnormal echocardiogram 5/15/12    EF >55%. mild diastolic dysfunction. RVSP 70 mmHg on echocardiogram. the right ventricle is moderately to severely enlarged with preserved function. Severe tricuspid regurgitation    Abnormal resting ECG findings 6/19/12    sinus bradycardia 55 beats per minute. Incomplete right bundle branch block. Evidence of left atrial enlargement. Abnormal ECG a    Allergic rhinitis 4/17/2017    Anxiety     ASHD (arteriosclerotic heart disease) 8/1997    CA 50-60%    ASHD (arteriosclerotic heart disease) 08/97    CA 50-60%    Elevated liver enzymes 1996    GERD (gastroesophageal reflux disease)     H/O echocardiogram 10/13/15    EF:60%. Mild mitral regurgitation. Moderate to severe tricuspid reguritation. Moderately dilated right atrium and right ventricle systolic pressure of 57 mmHg. Evidence of mild (Grade 1) diastolic dysfunction.  Hiatal hernia     History of echocardiogram 9/27/14    Compared to the previous study of 4/18/13, the patients estimated RVSP has decreased from >75 mmHg to 35mmHg    History of stress test 10/26/15    Probably normal. EF >70%. Significant electrocardiographic  evidence of MI during EKG monitoring without significant associated arrhythmias. Max ST depression was 1.4 mm in lead ll. Low risk for significant CAD.     Hyperglycemia 2008    Hyperlipidemia 1992    Hypertension 1991    Hyperthyroidism     Kidney stone     Menopause age 36    Osteoarthritis     lumbar    PAH (pulmonary artery hypertension) 7/5/2012    RHC: Mean PA 26, PCWP 12. Echo 6/12: RVSP 70    Pulmonary hypertension 5/15/12    RVSP 70 mmHg on echocardiogram. the right ventricle is moderately to severely enlarged with preserved function. Severe tricuspid regurgitation.  Status post right heart catheterization 7/5/2012    RHC: Mean PA 26, PCWP 12.    Tricuspid regurgitation     MR 4/2002 ATB proph    Type II or unspecified type diabetes mellitus without mention of complication, not stated as uncontrolled 2/12/2015    Unspecified hypothyroidism 2/12/2015      Social History   Substance Use Topics    Smoking status: Never Smoker    Smokeless tobacco: Never Used    Alcohol use No      Current Outpatient Prescriptions   Medication Sig Dispense Refill    ciprofloxacin (CIPRO) 500 MG tablet Take 1 tablet by mouth 2 times daily for 10 days 5 tablet 0    furosemide (LASIX) 20 MG tablet TAKE ONE TABLET BY MOUTH DAILY 90 tablet 1    omeprazole (PRILOSEC) 40 MG delayed release capsule TAKE ONE CAPSULE BY MOUTH DAILY 90 capsule 1    amLODIPine (NORVASC) 5 MG tablet Take 1 tablet by mouth daily 90 tablet 3    losartan (COZAAR) 100 MG tablet TAKE 1 TABLET BY MOUTH DAILY. 90 tablet 3    Tadalafil, PAH, (ADCIRCA) 20 MG tablet Take 2 tablets by mouth daily Can not take nitrates with this drug 180 tablet 3    atorvastatin (LIPITOR) 40 MG tablet TAKE 1 TABLET EVERY DAY 90 tablet 3    carvedilol (COREG) 25 MG tablet Take 1 tablet by mouth 2 times daily (with meals) 180 tablet 3    levothyroxine (SYNTHROID) 100 MCG tablet Take 1 tablet by mouth Daily 90 tablet 3    rOPINIRole (REQUIP) 0.5 MG tablet TAKE 1 TABLET BY MOUTH NIGHTLY 90 tablet 2    ferrous sulfate 325 (65 FE) MG tablet Take 1 tablet by mouth 2 times daily (with meals) (Patient taking differently: Take 325 mg by mouth 2 times daily ) 30 tablet 3    calcium carbonate (TUMS) 500 MG chewable tablet Take 1 tablet by mouth as needed        No current facility-administered medications for this visit.       No Known Allergies    PHYSICAL EXAM:    Ht 5' 2\" (1.575 m)   Wt Readings from Last 3 Encounters:   10/30/17 124 lb (56.2 kg)   10/27/17 126 lb (57.2 kg)   10/02/17 132 lb (59.9 kg)     BP Readings from Last 3 Encounters:   10/30/17 (!) 141/66   10/27/17 122/70   10/02/17 (!) 140/70       General Appearance: in no acute distress, well developed, well nourished. Eyes: pupils equal, round reactive to light and accommodation. Ears: normal canal and TM's. Nose: nares patent, no lesions. Oral Cavity: mucosa moist.  Throat: clear. Neck/Thyroid: neck supple, full range of motion, no cervical lymphadenopathy, no thyromegaly or carotid bruits. Skin: warm and dry. No suspicious lesions. Heart: regular rate and rhythm. No murmurs. S1, S2 normal, no gallops. Lungs: clear to auscultation bilaterally. Abdomen: bowel sounds present, soft, nontender, nondistended, no masses or organomegaly. Musculoskeletal: normal, full range of motion in knees and hips, no swelling or tenderness. Extremities: no cyanosis or edema. Peripheral Pulses: 2+ throughout, symetric. Neurologic: nonfocal, motor strength normal upper and lower extremities, sensory exam intact. Psych: normal affect, speech fluent. ASSESSMENT:  No diagnosis found. PLAN:  ***  No orders of the defined types were placed in this encounter. No orders of the defined types were placed in this encounter. Scribed by: {Blank single:[id number]::\"AVI Menendez, CMA\",\"ANA Tirado, RMA\",\"SERGIO Trevino, RMA\"}

## 2017-11-01 NOTE — CARE COORDINATION
[facility] Transitions Initial Follow Up Call    Call within 2 business days of discharge: Yes    Patient: Lorelei Rachel Patient : 10/13/1927   MRN: <[id number]>  Reason for Admission: There are no discharge diagnoses documented for the most recent discharge. Discharge Date: 10/30/17 RARS: [facility] Risk Score: 10.25       Second attempt to contact patient for transition call. Unable to reach, left voicemail with contact information for patient to call back.         Care Transitions 24 Hour Call    Care Transitions Interventions         Follow Up  Future Appointments  Date Time Provider Katy Hassan   2017 2:15 PM MD [facility]   2017 9:15 AM SCHEDULE, [facility]   2017 9:00 AM MD [facility]   2018 9:15 AM MD [facility]       Alison Rebolledo RN

## 2017-11-02 LAB
BACTERIA URINE, POC: 0
BILIRUBIN URINE: 0 MG/DL
BLOOD, URINE: POSITIVE
CASTS URINE, POC: 0
CLARITY: CLEAR
COLOR: YELLOW
CRYSTALS URINE, POC: 0
EPI CELLS URINE, POC: ABNORMAL
GLUCOSE URINE: NEGATIVE
KETONES, URINE: NEGATIVE
LEUKOCYTE EST, POC: NEGATIVE
NITRITE, URINE: NEGATIVE
PH UA: 5 (ref 4.5–8)
PROTEIN UA: POSITIVE
RBC URINE, POC: 0
SPECIFIC GRAVITY UA: 1.03 (ref 1–1.03)
UROBILINOGEN, URINE: NORMAL
WBC URINE, POC: 0
YEAST URINE, POC: 0

## 2017-11-06 RX ORDER — CARVEDILOL 25 MG/1
TABLET ORAL
Qty: 180 TABLET | Refills: 3 | Status: SHIPPED | OUTPATIENT
Start: 2017-11-06 | End: 2018-10-29 | Stop reason: SDUPTHER

## 2017-11-06 RX ORDER — LEVOTHYROXINE SODIUM 0.1 MG/1
TABLET ORAL
Qty: 90 TABLET | Refills: 3 | Status: SHIPPED | OUTPATIENT
Start: 2017-11-06 | End: 2018-10-29 | Stop reason: SDUPTHER

## 2017-11-17 RX ORDER — ATORVASTATIN CALCIUM 40 MG/1
TABLET, FILM COATED ORAL
Qty: 90 TABLET | Refills: 3 | Status: ON HOLD | OUTPATIENT
Start: 2017-11-17 | End: 2019-11-04

## 2017-11-21 ENCOUNTER — OFFICE VISIT (OUTPATIENT)
Dept: CARDIOLOGY | Age: 82
End: 2017-11-21
Payer: MEDICARE

## 2017-11-21 VITALS
WEIGHT: 126 LBS | DIASTOLIC BLOOD PRESSURE: 71 MMHG | SYSTOLIC BLOOD PRESSURE: 129 MMHG | RESPIRATION RATE: 20 BRPM | OXYGEN SATURATION: 95 % | BODY MASS INDEX: 22.32 KG/M2 | HEART RATE: 63 BPM | HEIGHT: 63 IN

## 2017-11-21 DIAGNOSIS — E78.2 MIXED HYPERLIPIDEMIA: ICD-10-CM

## 2017-11-21 DIAGNOSIS — I25.10 ASHD (ARTERIOSCLEROTIC HEART DISEASE): ICD-10-CM

## 2017-11-21 DIAGNOSIS — I27.20 PULMONARY HYPERTENSION (HCC): Primary | ICD-10-CM

## 2017-11-21 PROCEDURE — 1036F TOBACCO NON-USER: CPT | Performed by: FAMILY MEDICINE

## 2017-11-21 PROCEDURE — G8420 CALC BMI NORM PARAMETERS: HCPCS | Performed by: FAMILY MEDICINE

## 2017-11-21 PROCEDURE — G8484 FLU IMMUNIZE NO ADMIN: HCPCS | Performed by: FAMILY MEDICINE

## 2017-11-21 PROCEDURE — G8598 ASA/ANTIPLAT THER USED: HCPCS | Performed by: FAMILY MEDICINE

## 2017-11-21 PROCEDURE — 99214 OFFICE O/P EST MOD 30 MIN: CPT | Performed by: FAMILY MEDICINE

## 2017-11-21 PROCEDURE — 1123F ACP DISCUSS/DSCN MKR DOCD: CPT | Performed by: FAMILY MEDICINE

## 2017-11-21 PROCEDURE — 1090F PRES/ABSN URINE INCON ASSESS: CPT | Performed by: FAMILY MEDICINE

## 2017-11-21 PROCEDURE — 4040F PNEUMOC VAC/ADMIN/RCVD: CPT | Performed by: FAMILY MEDICINE

## 2017-11-21 PROCEDURE — G8427 DOCREV CUR MEDS BY ELIG CLIN: HCPCS | Performed by: FAMILY MEDICINE

## 2017-11-21 PROCEDURE — 1111F DSCHRG MED/CURRENT MED MERGE: CPT | Performed by: FAMILY MEDICINE

## 2017-11-21 NOTE — PROGRESS NOTES
Madhavi Santamaria CMA am scribing for and in the presence of Dr. Montey Runner    Patient: Delorsi Fritz  : 10/13/1927  Date of Visit: 2017    REASON FOR VISIT / CONSULTATION: pulmonary artery hypertension, paroxysmal atrial fibrillation, 6 Month Follow-Up (HX: Mild pulomnary artery HTN, CHF. Pt wt at last visit was 136 todays wt is 126. In hospital on 10/27-10/30 for pnomonia and urinary tract infection. ECHO done on . Last visit added Norvasc 5mg daily. Pt states she is doing good. Denies: CP, palpitations, lightheaded/dizziness, SOB. )      Dear Varun Vitale MD      I had the pleasure of seeing your patient Deloris Fritz in follow-up today. As you know, Ms. Her is a 80 y.o. female with a history of mild pulmonary artery hypertension with a mean pulmonary artery pressure of 26 with a normal LVEDP as well as moderate to severe right ventricular enlargement. As a result, she was started on Adcirca and her estimated RVSP on echocardiography has fallen from 70-75 mmHg to only 36 mmHg on her 14 echocardiogram with a significant improvement in her symptoms. Her repeat echo in  showed her estimated RVSP to be only 43 mmHg. Since the last my saw her, she reports doing great. She continues to report living at home and being quite self-sufficient with doing her own house work. She does say it take a little bit more time. She denied any current or recent chest pain, increased shortness of breath, abdominal pain, bleeding problems, near falls or recent falls, problems with her medications or any other concerns at this time. Past Medical History:   Diagnosis Date    Abnormal echocardiogram 5/15/12    EF >55%. mild diastolic dysfunction. RVSP 70 mmHg on echocardiogram. the right ventricle is moderately to severely enlarged with preserved function. Severe tricuspid regurgitation    Abnormal resting ECG findings 12    sinus bradycardia 55 beats per minute.  Incomplete right bundle branch block. Evidence of left atrial enlargement. Abnormal ECG a    Allergic rhinitis 4/17/2017    Anxiety     ASHD (arteriosclerotic heart disease) 8/1997    CA 50-60%    ASHD (arteriosclerotic heart disease) 08/97    CA 50-60%    Elevated liver enzymes 1996    GERD (gastroesophageal reflux disease)     H/O echocardiogram 10/13/15    EF:60%. Mild mitral regurgitation. Moderate to severe tricuspid reguritation. Moderately dilated right atrium and right ventricle systolic pressure of 57 mmHg. Evidence of mild (Grade 1) diastolic dysfunction.  H/O echocardiogram 05/26/2017    Estimated EF 55%. The right atrium appears severly dialted. Right ventricular dilatation. Moderate pulmonary HTN with an estimated right ventricular systolic pressure of [id number]. Moderate to severe tricuspid regurg. Evidence of mild diastolic dysfunction is seen. Compared to the previous study of 10/13/15 the pt estimated right sided pressure has further declined from 57mmHg to 45mmHg.  Hiatal hernia     History of echocardiogram 9/27/14    Compared to the previous study of 4/18/13, the patients estimated RVSP has decreased from >75 mmHg to 35mmHg    History of stress test 10/26/15    Probably normal. EF >70%. Significant electrocardiographic  evidence of MI during EKG monitoring without significant associated arrhythmias. Max ST depression was 1.4 mm in lead ll. Low risk for significant CAD.  Hyperglycemia 2008    Hyperlipidemia 1992    Hypertension 1991    Hyperthyroidism     Kidney stone     Menopause age 36    Osteoarthritis     lumbar    PAH (pulmonary artery hypertension) 7/5/2012    RHC: Mean PA 26, PCWP 12. Echo 6/12: RVSP 70    Pulmonary hypertension 5/15/12    RVSP 70 mmHg on echocardiogram. the right ventricle is moderately to severely enlarged with preserved function. Severe tricuspid regurgitation.     Status post right heart catheterization 7/5/2012    RHC: Mean PA 26, PCWP 12.    Tricuspid tablet by mouth as needed          FAMILY HISTORY: family history includes Cancer in her sister; Heart Disease in her sister; Heart Failure in her mother; High Blood Pressure in her mother and sister; High Cholesterol in her sister; Other in her brother and mother; Stroke in her father. PHYSICAL EXAM:   BP (!) 155/80 (Site: Right Arm, Position: Sitting, Cuff Size: Medium Adult)   Pulse 66   Resp 20   Ht 5' 3\" (1.6 m)   Wt 126 lb (57.2 kg)   SpO2 96%   BMI 22.32 kg/m²  Body mass index is 22.32 kg/m². Constitutional: She is oriented to person, place, and time. She appears well-developed and well-nourished. In no acute distress. HEENT: Normocephalic and atraumatic. No JVD present. Carotid bruit is not present. No mass and no thyromegaly present. No lymphadenopathy present. Cardiovascular: Normal rate, regular rhythm, normal heart sounds and intact distal pulses. Exam reveals no gallop and no friction rub. II/VI systolic murmur heard maximally at the apex. Pulmonary/Chest: Effort normal and breath sounds normal. No respiratory distress. She has no wheezes, rhonchi or rales. Abdominal: Soft, non-tender. Bowel sounds and aorta are normal. She exhibits no organomegaly, mass or bruit. Extremities: No significant edema, cyanosis, or clubbing. Pulses are 2+ radial/carotid/dorsalis pedis and posterior tibial bilaterally. Neurological: She is alert and oriented to person, place, and time. No evidence of gross cranial nerve deficit. Coordination appeared normal.   Skin: Skin is warm and dry. There is no rash or diaphoresis. Psychiatric: She has a normal mood and affect.  Her speech is normal and behavior is normal.      MOST RECENT LABS ON RECORD:   Lab Results   Component Value Date    WBC 8.1 10/29/2017    HGB 10.4 (L) 10/29/2017    HCT 31.8 (L) 10/29/2017     10/29/2017    CHOL 169 06/16/2015    TRIG 135 06/16/2015    HDL 59 06/16/2015    ALT 14 10/27/2017    AST 37 (H) 10/27/2017     patient's current medical conditions are: Intermediate. The documentation recorded by the scribe, accurately and completely reflects the services I personally performed and the decisions made by me. Ion Sanchez MD, MS, F.A.C.C. 11/21/2017

## 2017-12-11 RX ORDER — OMEPRAZOLE 40 MG/1
CAPSULE, DELAYED RELEASE ORAL
Qty: 90 CAPSULE | Refills: 3 | Status: SHIPPED | OUTPATIENT
Start: 2017-12-11 | End: 2018-12-12 | Stop reason: SDUPTHER

## 2017-12-11 RX ORDER — FUROSEMIDE 20 MG/1
TABLET ORAL
Qty: 90 TABLET | Refills: 3 | Status: SHIPPED | OUTPATIENT
Start: 2017-12-11 | End: 2018-03-02 | Stop reason: DRUGHIGH

## 2018-01-22 ENCOUNTER — TELEPHONE (OUTPATIENT)
Dept: FAMILY MEDICINE CLINIC | Age: 83
End: 2018-01-22

## 2018-02-06 ENCOUNTER — HOSPITAL ENCOUNTER (OUTPATIENT)
Age: 83
Discharge: HOME | End: 2018-02-06
Payer: MEDICARE

## 2018-02-06 ENCOUNTER — OFFICE VISIT (OUTPATIENT)
Dept: FAMILY MEDICINE CLINIC | Age: 83
End: 2018-02-06
Payer: MEDICARE

## 2018-02-06 VITALS
SYSTOLIC BLOOD PRESSURE: 128 MMHG | HEIGHT: 63 IN | DIASTOLIC BLOOD PRESSURE: 76 MMHG | BODY MASS INDEX: 22.86 KG/M2 | WEIGHT: 129 LBS

## 2018-02-06 DIAGNOSIS — E03.9 HYPOTHYROIDISM, UNSPECIFIED TYPE: ICD-10-CM

## 2018-02-06 DIAGNOSIS — I10 ESSENTIAL HYPERTENSION: ICD-10-CM

## 2018-02-06 DIAGNOSIS — E78.2 MIXED HYPERLIPIDEMIA: ICD-10-CM

## 2018-02-06 DIAGNOSIS — I25.10 ASHD (ARTERIOSCLEROTIC HEART DISEASE): ICD-10-CM

## 2018-02-06 DIAGNOSIS — E78.49 OTHER HYPERLIPIDEMIA: Primary | ICD-10-CM

## 2018-02-06 DIAGNOSIS — D64.9 ANEMIA, UNSPECIFIED TYPE: ICD-10-CM

## 2018-02-06 DIAGNOSIS — E11.9 TYPE 2 DIABETES MELLITUS WITHOUT COMPLICATION, UNSPECIFIED LONG TERM INSULIN USE STATUS: ICD-10-CM

## 2018-02-06 LAB
ALBUMIN SERPL-MCNC: 3.6 G/DL (ref 3.5–5.2)
ALBUMIN/GLOBULIN RATIO: 1.2 (ref 1–2.5)
ALP BLD-CCNC: 72 U/L (ref 35–104)
ALT SERPL-CCNC: 10 U/L (ref 5–33)
ANION GAP SERPL CALCULATED.3IONS-SCNC: 10 MMOL/L (ref 9–17)
AST SERPL-CCNC: 18 U/L
BILIRUB SERPL-MCNC: 0.32 MG/DL (ref 0.3–1.2)
BUN BLDV-MCNC: 17 MG/DL (ref 8–23)
BUN/CREAT BLD: 17 (ref 9–20)
CALCIUM SERPL-MCNC: 8.9 MG/DL (ref 8.6–10.4)
CHLORIDE BLD-SCNC: 105 MMOL/L (ref 98–107)
CO2: 28 MMOL/L (ref 20–31)
CREAT SERPL-MCNC: 0.98 MG/DL (ref 0.5–0.9)
GFR AFRICAN AMERICAN: >60 ML/MIN
GFR NON-AFRICAN AMERICAN: 53 ML/MIN
GFR SERPL CREATININE-BSD FRML MDRD: ABNORMAL ML/MIN/{1.73_M2}
GFR SERPL CREATININE-BSD FRML MDRD: ABNORMAL ML/MIN/{1.73_M2}
GLUCOSE BLD-MCNC: 118 MG/DL (ref 70–99)
HCT VFR BLD CALC: 38.7 % (ref 36–46)
HEMOGLOBIN: 12.7 G/DL (ref 12–16)
MCH RBC QN AUTO: 33.5 PG (ref 26–34)
MCHC RBC AUTO-ENTMCNC: 32.7 G/DL (ref 31–37)
MCV RBC AUTO: 102.4 FL (ref 80–100)
NRBC AUTOMATED: ABNORMAL PER 100 WBC
PDW BLD-RTO: 16.5 % (ref 12.1–15.2)
PLATELET # BLD: 211 K/UL (ref 140–450)
PMV BLD AUTO: 9.9 FL (ref 6–12)
POTASSIUM SERPL-SCNC: 3.7 MMOL/L (ref 3.7–5.3)
RBC # BLD: 3.78 M/UL (ref 4–5.2)
SODIUM BLD-SCNC: 143 MMOL/L (ref 135–144)
T4 TOTAL: 7.9 UG/DL (ref 4.5–12)
TOTAL PROTEIN: 6.5 G/DL (ref 6.4–8.3)
TSH SERPL DL<=0.05 MIU/L-ACNC: 2.32 MIU/L (ref 0.3–5)
WBC # BLD: 6.2 K/UL (ref 3.5–11)

## 2018-02-06 PROCEDURE — 85027 COMPLETE CBC AUTOMATED: CPT

## 2018-02-06 PROCEDURE — 1123F ACP DISCUSS/DSCN MKR DOCD: CPT | Performed by: FAMILY MEDICINE

## 2018-02-06 PROCEDURE — 99214 OFFICE O/P EST MOD 30 MIN: CPT | Performed by: FAMILY MEDICINE

## 2018-02-06 PROCEDURE — 4040F PNEUMOC VAC/ADMIN/RCVD: CPT | Performed by: FAMILY MEDICINE

## 2018-02-06 PROCEDURE — G8484 FLU IMMUNIZE NO ADMIN: HCPCS | Performed by: FAMILY MEDICINE

## 2018-02-06 PROCEDURE — 84436 ASSAY OF TOTAL THYROXINE: CPT

## 2018-02-06 PROCEDURE — G8599 NO ASA/ANTIPLAT THER USE RNG: HCPCS | Performed by: FAMILY MEDICINE

## 2018-02-06 PROCEDURE — G8427 DOCREV CUR MEDS BY ELIG CLIN: HCPCS | Performed by: FAMILY MEDICINE

## 2018-02-06 PROCEDURE — 1036F TOBACCO NON-USER: CPT | Performed by: FAMILY MEDICINE

## 2018-02-06 PROCEDURE — 36415 COLL VENOUS BLD VENIPUNCTURE: CPT

## 2018-02-06 PROCEDURE — 1090F PRES/ABSN URINE INCON ASSESS: CPT | Performed by: FAMILY MEDICINE

## 2018-02-06 PROCEDURE — 84443 ASSAY THYROID STIM HORMONE: CPT

## 2018-02-06 PROCEDURE — 80053 COMPREHEN METABOLIC PANEL: CPT

## 2018-02-06 PROCEDURE — G8420 CALC BMI NORM PARAMETERS: HCPCS | Performed by: FAMILY MEDICINE

## 2018-02-06 ASSESSMENT — PATIENT HEALTH QUESTIONNAIRE - PHQ9
1. LITTLE INTEREST OR PLEASURE IN DOING THINGS: 0
SUM OF ALL RESPONSES TO PHQ9 QUESTIONS 1 & 2: 0
SUM OF ALL RESPONSES TO PHQ QUESTIONS 1-9: 0
2. FEELING DOWN, DEPRESSED OR HOPELESS: 0

## 2018-02-06 NOTE — PATIENT INSTRUCTIONS
PLAN:  She looks excellent today  I will not make any changes although I would like for her to have labs done today

## 2018-02-27 ENCOUNTER — HOSPITAL ENCOUNTER (OUTPATIENT)
Age: 83
Discharge: HOME | End: 2018-02-27
Payer: MEDICARE

## 2018-02-27 ENCOUNTER — OFFICE VISIT (OUTPATIENT)
Dept: FAMILY MEDICINE CLINIC | Age: 83
End: 2018-02-27
Payer: MEDICARE

## 2018-02-27 VITALS
BODY MASS INDEX: 23.74 KG/M2 | SYSTOLIC BLOOD PRESSURE: 132 MMHG | WEIGHT: 134 LBS | DIASTOLIC BLOOD PRESSURE: 88 MMHG | HEIGHT: 63 IN

## 2018-02-27 DIAGNOSIS — I34.0 MITRAL VALVE INSUFFICIENCY, UNSPECIFIED ETIOLOGY: Primary | ICD-10-CM

## 2018-02-27 DIAGNOSIS — I50.30 DIASTOLIC CONGESTIVE HEART FAILURE, UNSPECIFIED CONGESTIVE HEART FAILURE CHRONICITY: ICD-10-CM

## 2018-02-27 DIAGNOSIS — I27.20 PULMONARY HYPERTENSION (HCC): ICD-10-CM

## 2018-02-27 DIAGNOSIS — I34.0 MITRAL VALVE INSUFFICIENCY, UNSPECIFIED ETIOLOGY: ICD-10-CM

## 2018-02-27 LAB
ABSOLUTE EOS #: 0.15 K/UL (ref 0–0.44)
ABSOLUTE IMMATURE GRANULOCYTE: <0.03 K/UL (ref 0–0.3)
ABSOLUTE LYMPH #: 2.06 K/UL (ref 1.1–3.7)
ABSOLUTE MONO #: 0.52 K/UL (ref 0.1–1.2)
ANION GAP SERPL CALCULATED.3IONS-SCNC: 11 MMOL/L (ref 9–17)
BASOPHILS # BLD: 1 % (ref 0–2)
BASOPHILS ABSOLUTE: 0.06 K/UL (ref 0–0.2)
BNP INTERPRETATION: ABNORMAL
BUN BLDV-MCNC: 17 MG/DL (ref 8–23)
BUN/CREAT BLD: 20 (ref 9–20)
CALCIUM SERPL-MCNC: 8.7 MG/DL (ref 8.6–10.4)
CHLORIDE BLD-SCNC: 102 MMOL/L (ref 98–107)
CO2: 28 MMOL/L (ref 20–31)
CREAT SERPL-MCNC: 0.87 MG/DL (ref 0.5–0.9)
DIFFERENTIAL TYPE: ABNORMAL
EOSINOPHILS RELATIVE PERCENT: 3 % (ref 1–4)
GFR AFRICAN AMERICAN: >60 ML/MIN
GFR NON-AFRICAN AMERICAN: >60 ML/MIN
GFR SERPL CREATININE-BSD FRML MDRD: ABNORMAL ML/MIN/{1.73_M2}
GFR SERPL CREATININE-BSD FRML MDRD: ABNORMAL ML/MIN/{1.73_M2}
GLUCOSE BLD-MCNC: 122 MG/DL (ref 70–99)
HCT VFR BLD CALC: 38.7 % (ref 36.3–47.1)
HEMOGLOBIN: 12.3 G/DL (ref 11.9–15.1)
IMMATURE GRANULOCYTES: 0 %
LYMPHOCYTES # BLD: 34 % (ref 24–43)
MAGNESIUM: 2.2 MG/DL (ref 1.6–2.6)
MCH RBC QN AUTO: 32.9 PG (ref 25.2–33.5)
MCHC RBC AUTO-ENTMCNC: 31.8 G/DL (ref 28.4–34.8)
MCV RBC AUTO: 103.5 FL (ref 82.6–102.9)
MONOCYTES # BLD: 9 % (ref 3–12)
NRBC AUTOMATED: 0 PER 100 WBC
PDW BLD-RTO: 14.2 % (ref 11.8–14.4)
PLATELET # BLD: 225 K/UL (ref 138–453)
PLATELET ESTIMATE: ABNORMAL
PMV BLD AUTO: 11 FL (ref 8.1–13.5)
POTASSIUM SERPL-SCNC: 3.3 MMOL/L (ref 3.7–5.3)
PRO-BNP: 3375 PG/ML
RBC # BLD: 3.74 M/UL (ref 3.95–5.11)
RBC # BLD: ABNORMAL 10*6/UL
SEG NEUTROPHILS: 53 % (ref 36–65)
SEGMENTED NEUTROPHILS ABSOLUTE COUNT: 3.19 K/UL (ref 1.5–8.1)
SODIUM BLD-SCNC: 141 MMOL/L (ref 135–144)
T4 TOTAL: 8.8 UG/DL (ref 4.5–12)
TSH SERPL DL<=0.05 MIU/L-ACNC: 1.71 MIU/L (ref 0.3–5)
WBC # BLD: 6 K/UL (ref 3.5–11.3)
WBC # BLD: ABNORMAL 10*3/UL

## 2018-02-27 PROCEDURE — 1123F ACP DISCUSS/DSCN MKR DOCD: CPT | Performed by: FAMILY MEDICINE

## 2018-02-27 PROCEDURE — 4040F PNEUMOC VAC/ADMIN/RCVD: CPT | Performed by: FAMILY MEDICINE

## 2018-02-27 PROCEDURE — 84443 ASSAY THYROID STIM HORMONE: CPT

## 2018-02-27 PROCEDURE — 85025 COMPLETE CBC W/AUTO DIFF WBC: CPT

## 2018-02-27 PROCEDURE — 99214 OFFICE O/P EST MOD 30 MIN: CPT | Performed by: FAMILY MEDICINE

## 2018-02-27 PROCEDURE — 36415 COLL VENOUS BLD VENIPUNCTURE: CPT

## 2018-02-27 PROCEDURE — G8599 NO ASA/ANTIPLAT THER USE RNG: HCPCS | Performed by: FAMILY MEDICINE

## 2018-02-27 PROCEDURE — G8428 CUR MEDS NOT DOCUMENT: HCPCS | Performed by: FAMILY MEDICINE

## 2018-02-27 PROCEDURE — 1036F TOBACCO NON-USER: CPT | Performed by: FAMILY MEDICINE

## 2018-02-27 PROCEDURE — 84436 ASSAY OF TOTAL THYROXINE: CPT

## 2018-02-27 PROCEDURE — 1090F PRES/ABSN URINE INCON ASSESS: CPT | Performed by: FAMILY MEDICINE

## 2018-02-27 PROCEDURE — G8420 CALC BMI NORM PARAMETERS: HCPCS | Performed by: FAMILY MEDICINE

## 2018-02-27 PROCEDURE — 83880 ASSAY OF NATRIURETIC PEPTIDE: CPT

## 2018-02-27 PROCEDURE — 80048 BASIC METABOLIC PNL TOTAL CA: CPT

## 2018-02-27 PROCEDURE — G8484 FLU IMMUNIZE NO ADMIN: HCPCS | Performed by: FAMILY MEDICINE

## 2018-02-27 PROCEDURE — 83735 ASSAY OF MAGNESIUM: CPT

## 2018-02-27 RX ORDER — FUROSEMIDE 20 MG/1
20 TABLET ORAL DAILY
Qty: 90 TABLET | Refills: 3 | Status: CANCELLED
Start: 2018-02-27

## 2018-02-27 RX ORDER — LOSARTAN POTASSIUM 100 MG/1
TABLET ORAL
Qty: 90 TABLET | Refills: 3 | Status: SHIPPED | OUTPATIENT
Start: 2018-02-27 | End: 2018-03-01 | Stop reason: SDUPTHER

## 2018-02-27 NOTE — PROGRESS NOTES
nourished. Eyes: pupils equal, round reactive to light and accommodation. Ears: normal canal and TM's. Nose: nares patent, no lesions. Oral Cavity: mucosa moist.  Throat: clear. Neck/Thyroid: neck supple, full range of motion, no cervical lymphadenopathy, no thyromegaly or carotid bruits. Skin: warm and dry. No suspicious lesions. Heart: regular rate and rhythm. No murmurs. S1, S2 normal, no gallops. Lungs: clear to auscultation bilaterally. Abdomen: bowel sounds present, soft, nontender, nondistended, no masses or organomegaly. Musculoskeletal: normal, full range of motion in knees and hips, no swelling or tenderness. Extremities: no cyanosis or edema. Peripheral Pulses: 2+ throughout, symetric. Neurologic: nonfocal, motor strength normal upper and lower extremities, sensory exam intact. Psych: normal affect, speech fluent. ASSESSMENT:  No diagnosis found. PLAN:  ***  No orders of the defined types were placed in this encounter. No orders of the defined types were placed in this encounter. The documentation recorded by the scribe, accurately reflects the services I personally performed and the decisions made by me.  Roberta Hemphill MD

## 2018-02-27 NOTE — PROGRESS NOTES
kg)          BP Readings from Last 3 Encounters:   02/06/18 128/76   11/21/17 129/71   11/01/17 (!) 158/80      General Appearance: in no acute distress, well developed, well nourished. Eyes: pupils equal, round reactive to light and accommodation. Ears: normal canal and TM's. Nose: nares patent, no lesions. Oral Cavity: mucosa moist.  Throat: clear. Neck/Thyroid: neck supple, full range of motion, no cervical lymphadenopathy, no thyromegaly or carotid bruits. Skin: warm and dry. No suspicious lesions. Heart: S1, S2 normal, no gallops. Rate 65, irregular, 2/6 systolic murmur at axilla  Lungs: diminished R base  Abdomen: bowel sounds present, soft, nontender, nondistended, no masses or organomegaly. Musculoskeletal: normal, full range of motion in knees and hips, no swelling or tenderness. Extremities: no cyanosis or edema. 2+ edema at ankles up to knees  Peripheral Pulses: 2+ throughout, symetric. Neurologic: nonfocal, motor strength normal upper and lower extremities, sensory exam intact. Psych: normal affect, speech fluent. ASSESSMENT:  1. Mitral valve insufficiency, unspecified etiology  Basic Metabolic Panel    Brain Natriuretic Peptide    CBC Auto Differential    Magnesium    acute on chronic     2. Pulmonary hypertension     3. Diastolic congestive heart failure, unspecified congestive heart failure chronicity (HCC)  Brain Natriuretic Peptide    T4    TSH without Reflex     . PLAN:  I will order lab work and call with results. I also will have her take Lasix 40 mg today when she gets home and then 40 mg again tomorrow morning and see her back tomorrow afternoon. No orders of the defined types were placed in this encounter.     Encounter Medications    No orders of the defined types were placed in this encounter. The documentation recorded by the scribe accurately reflects the services I personally performed and the decisions made by me.  Medina Burger MD

## 2018-02-28 ENCOUNTER — OFFICE VISIT (OUTPATIENT)
Dept: FAMILY MEDICINE CLINIC | Age: 83
End: 2018-02-28
Payer: MEDICARE

## 2018-02-28 VITALS
WEIGHT: 129 LBS | DIASTOLIC BLOOD PRESSURE: 82 MMHG | BODY MASS INDEX: 22.86 KG/M2 | HEIGHT: 63 IN | SYSTOLIC BLOOD PRESSURE: 110 MMHG

## 2018-02-28 DIAGNOSIS — I50.31 ACUTE DIASTOLIC CONGESTIVE HEART FAILURE (HCC): Primary | ICD-10-CM

## 2018-02-28 PROCEDURE — G8484 FLU IMMUNIZE NO ADMIN: HCPCS | Performed by: FAMILY MEDICINE

## 2018-02-28 PROCEDURE — G8420 CALC BMI NORM PARAMETERS: HCPCS | Performed by: FAMILY MEDICINE

## 2018-02-28 PROCEDURE — 1123F ACP DISCUSS/DSCN MKR DOCD: CPT | Performed by: FAMILY MEDICINE

## 2018-02-28 PROCEDURE — 1090F PRES/ABSN URINE INCON ASSESS: CPT | Performed by: FAMILY MEDICINE

## 2018-02-28 PROCEDURE — G8427 DOCREV CUR MEDS BY ELIG CLIN: HCPCS | Performed by: FAMILY MEDICINE

## 2018-02-28 PROCEDURE — 1036F TOBACCO NON-USER: CPT | Performed by: FAMILY MEDICINE

## 2018-02-28 PROCEDURE — 4040F PNEUMOC VAC/ADMIN/RCVD: CPT | Performed by: FAMILY MEDICINE

## 2018-02-28 PROCEDURE — 99213 OFFICE O/P EST LOW 20 MIN: CPT | Performed by: FAMILY MEDICINE

## 2018-02-28 PROCEDURE — G8599 NO ASA/ANTIPLAT THER USE RNG: HCPCS | Performed by: FAMILY MEDICINE

## 2018-02-28 NOTE — PROGRESS NOTES
documentation recorded by the scribe accurately reflects the services I personally performed and the decisions made by me.  Pablo Salinas MD

## 2018-03-01 RX ORDER — LOSARTAN POTASSIUM 100 MG/1
TABLET ORAL
Qty: 90 TABLET | Refills: 3 | Status: SHIPPED | OUTPATIENT
Start: 2018-03-01 | End: 2019-01-11 | Stop reason: SDUPTHER

## 2018-03-02 RX ORDER — FUROSEMIDE 20 MG/1
TABLET ORAL
Qty: 60 TABLET | Refills: 3 | Status: SHIPPED | OUTPATIENT
Start: 2018-03-02 | End: 2018-08-13 | Stop reason: SDUPTHER

## 2018-03-05 ENCOUNTER — TELEPHONE (OUTPATIENT)
Dept: FAMILY MEDICINE CLINIC | Age: 83
End: 2018-03-05

## 2018-03-12 RX ORDER — TADALAFIL 20 MG/1
40 TABLET ORAL DAILY
Qty: 180 TABLET | Refills: 3 | Status: SHIPPED | OUTPATIENT
Start: 2018-03-12 | End: 2018-12-05 | Stop reason: SDUPTHER

## 2018-03-19 ENCOUNTER — TELEPHONE (OUTPATIENT)
Dept: FAMILY MEDICINE CLINIC | Age: 83
End: 2018-03-19

## 2018-04-12 PROBLEM — Z09 HOSPITAL DISCHARGE FOLLOW-UP: Status: RESOLVED | Noted: 2017-11-01 | Resolved: 2018-04-12

## 2018-04-12 PROBLEM — E86.0 DEHYDRATION: Status: RESOLVED | Noted: 2017-10-27 | Resolved: 2018-04-12

## 2018-04-18 ENCOUNTER — TELEPHONE (OUTPATIENT)
Dept: CARDIOLOGY | Age: 83
End: 2018-04-18

## 2018-05-01 ENCOUNTER — HOSPITAL ENCOUNTER (OUTPATIENT)
Dept: NON INVASIVE DIAGNOSTICS | Age: 83
Discharge: HOME | End: 2018-05-01
Payer: MEDICARE

## 2018-05-01 LAB
LV EF: 60 %
LVEF MODALITY: NORMAL

## 2018-05-01 PROCEDURE — 93306 TTE W/DOPPLER COMPLETE: CPT

## 2018-05-03 RX ORDER — AMLODIPINE BESYLATE 5 MG/1
5 TABLET ORAL DAILY
Qty: 90 TABLET | Refills: 3 | Status: ON HOLD | OUTPATIENT
Start: 2018-05-03 | End: 2019-03-10 | Stop reason: HOSPADM

## 2018-06-12 ENCOUNTER — HOSPITAL ENCOUNTER (OUTPATIENT)
Age: 83
Discharge: HOME | End: 2018-06-12
Payer: MEDICARE

## 2018-06-12 ENCOUNTER — OFFICE VISIT (OUTPATIENT)
Dept: FAMILY MEDICINE CLINIC | Age: 83
End: 2018-06-12
Payer: MEDICARE

## 2018-06-12 VITALS
HEIGHT: 63 IN | BODY MASS INDEX: 22.86 KG/M2 | SYSTOLIC BLOOD PRESSURE: 122 MMHG | WEIGHT: 129 LBS | DIASTOLIC BLOOD PRESSURE: 64 MMHG

## 2018-06-12 DIAGNOSIS — E87.6 HYPOKALEMIA: ICD-10-CM

## 2018-06-12 DIAGNOSIS — I50.32 CHRONIC DIASTOLIC CONGESTIVE HEART FAILURE (HCC): ICD-10-CM

## 2018-06-12 DIAGNOSIS — J30.9 ALLERGIC RHINITIS, UNSPECIFIED CHRONICITY, UNSPECIFIED SEASONALITY, UNSPECIFIED TRIGGER: ICD-10-CM

## 2018-06-12 DIAGNOSIS — I10 ESSENTIAL HYPERTENSION: ICD-10-CM

## 2018-06-12 DIAGNOSIS — I27.0 PRIMARY PULMONARY HYPERTENSION (HCC): Primary | ICD-10-CM

## 2018-06-12 DIAGNOSIS — R05.9 COUGH: ICD-10-CM

## 2018-06-12 DIAGNOSIS — E03.9 HYPOTHYROIDISM, UNSPECIFIED TYPE: ICD-10-CM

## 2018-06-12 LAB
ABSOLUTE EOS #: 0.22 K/UL (ref 0–0.44)
ABSOLUTE IMMATURE GRANULOCYTE: <0.03 K/UL (ref 0–0.3)
ABSOLUTE LYMPH #: 1.82 K/UL (ref 1.1–3.7)
ABSOLUTE MONO #: 0.69 K/UL (ref 0.1–1.2)
ANION GAP SERPL CALCULATED.3IONS-SCNC: 10 MMOL/L (ref 9–17)
BASOPHILS # BLD: 1 % (ref 0–2)
BASOPHILS ABSOLUTE: 0.06 K/UL (ref 0–0.2)
BUN BLDV-MCNC: 23 MG/DL (ref 8–23)
BUN/CREAT BLD: 21 (ref 9–20)
CALCIUM SERPL-MCNC: 8.8 MG/DL (ref 8.6–10.4)
CHLORIDE BLD-SCNC: 102 MMOL/L (ref 98–107)
CO2: 33 MMOL/L (ref 20–31)
CREAT SERPL-MCNC: 1.1 MG/DL (ref 0.5–0.9)
DIFFERENTIAL TYPE: ABNORMAL
EOSINOPHILS RELATIVE PERCENT: 3 % (ref 1–4)
GFR AFRICAN AMERICAN: 57 ML/MIN
GFR NON-AFRICAN AMERICAN: 47 ML/MIN
GFR SERPL CREATININE-BSD FRML MDRD: ABNORMAL ML/MIN/{1.73_M2}
GFR SERPL CREATININE-BSD FRML MDRD: ABNORMAL ML/MIN/{1.73_M2}
GLUCOSE BLD-MCNC: 99 MG/DL (ref 70–99)
HCT VFR BLD CALC: 35.6 % (ref 36.3–47.1)
HEMOGLOBIN: 11.3 G/DL (ref 11.9–15.1)
IMMATURE GRANULOCYTES: 0 %
LYMPHOCYTES # BLD: 27 % (ref 24–43)
MCH RBC QN AUTO: 32.7 PG (ref 25.2–33.5)
MCHC RBC AUTO-ENTMCNC: 31.7 G/DL (ref 28.4–34.8)
MCV RBC AUTO: 102.9 FL (ref 82.6–102.9)
MONOCYTES # BLD: 10 % (ref 3–12)
NRBC AUTOMATED: 0 PER 100 WBC
PDW BLD-RTO: 16.4 % (ref 11.8–14.4)
PLATELET # BLD: 233 K/UL (ref 138–453)
PLATELET ESTIMATE: ABNORMAL
PMV BLD AUTO: 11.2 FL (ref 8.1–13.5)
POTASSIUM SERPL-SCNC: 4.6 MMOL/L (ref 3.7–5.3)
RBC # BLD: 3.46 M/UL (ref 3.95–5.11)
RBC # BLD: ABNORMAL 10*6/UL
SEG NEUTROPHILS: 59 % (ref 36–65)
SEGMENTED NEUTROPHILS ABSOLUTE COUNT: 3.88 K/UL (ref 1.5–8.1)
SODIUM BLD-SCNC: 145 MMOL/L (ref 135–144)
WBC # BLD: 6.7 K/UL (ref 3.5–11.3)
WBC # BLD: ABNORMAL 10*3/UL

## 2018-06-12 PROCEDURE — 80048 BASIC METABOLIC PNL TOTAL CA: CPT

## 2018-06-12 PROCEDURE — 96372 THER/PROPH/DIAG INJ SC/IM: CPT | Performed by: FAMILY MEDICINE

## 2018-06-12 PROCEDURE — 4040F PNEUMOC VAC/ADMIN/RCVD: CPT | Performed by: FAMILY MEDICINE

## 2018-06-12 PROCEDURE — G8599 NO ASA/ANTIPLAT THER USE RNG: HCPCS | Performed by: FAMILY MEDICINE

## 2018-06-12 PROCEDURE — G8427 DOCREV CUR MEDS BY ELIG CLIN: HCPCS | Performed by: FAMILY MEDICINE

## 2018-06-12 PROCEDURE — 1123F ACP DISCUSS/DSCN MKR DOCD: CPT | Performed by: FAMILY MEDICINE

## 2018-06-12 PROCEDURE — 85025 COMPLETE CBC W/AUTO DIFF WBC: CPT

## 2018-06-12 PROCEDURE — 36415 COLL VENOUS BLD VENIPUNCTURE: CPT

## 2018-06-12 PROCEDURE — 1036F TOBACCO NON-USER: CPT | Performed by: FAMILY MEDICINE

## 2018-06-12 PROCEDURE — 1090F PRES/ABSN URINE INCON ASSESS: CPT | Performed by: FAMILY MEDICINE

## 2018-06-12 PROCEDURE — 99214 OFFICE O/P EST MOD 30 MIN: CPT | Performed by: FAMILY MEDICINE

## 2018-06-12 PROCEDURE — G8420 CALC BMI NORM PARAMETERS: HCPCS | Performed by: FAMILY MEDICINE

## 2018-06-12 RX ORDER — TRIAMCINOLONE ACETONIDE 40 MG/ML
40 INJECTION, SUSPENSION INTRA-ARTICULAR; INTRAMUSCULAR ONCE
Status: COMPLETED | OUTPATIENT
Start: 2018-06-12 | End: 2018-06-12

## 2018-06-12 RX ADMIN — TRIAMCINOLONE ACETONIDE 40 MG: 40 INJECTION, SUSPENSION INTRA-ARTICULAR; INTRAMUSCULAR at 11:31

## 2018-06-14 ENCOUNTER — OFFICE VISIT (OUTPATIENT)
Dept: FAMILY MEDICINE CLINIC | Age: 83
End: 2018-06-14
Payer: MEDICARE

## 2018-06-14 VITALS
DIASTOLIC BLOOD PRESSURE: 74 MMHG | WEIGHT: 129.4 LBS | BODY MASS INDEX: 22.93 KG/M2 | SYSTOLIC BLOOD PRESSURE: 108 MMHG | TEMPERATURE: 98.5 F | HEIGHT: 63 IN

## 2018-06-14 DIAGNOSIS — J30.9 ALLERGIC RHINITIS, UNSPECIFIED CHRONICITY, UNSPECIFIED SEASONALITY, UNSPECIFIED TRIGGER: Primary | ICD-10-CM

## 2018-06-14 DIAGNOSIS — J45.909 REACTIVE AIRWAY DISEASE WITHOUT COMPLICATION, UNSPECIFIED ASTHMA SEVERITY, UNSPECIFIED WHETHER PERSISTENT: ICD-10-CM

## 2018-06-14 PROCEDURE — G8427 DOCREV CUR MEDS BY ELIG CLIN: HCPCS | Performed by: FAMILY MEDICINE

## 2018-06-14 PROCEDURE — G8420 CALC BMI NORM PARAMETERS: HCPCS | Performed by: FAMILY MEDICINE

## 2018-06-14 PROCEDURE — 1090F PRES/ABSN URINE INCON ASSESS: CPT | Performed by: FAMILY MEDICINE

## 2018-06-14 PROCEDURE — 1036F TOBACCO NON-USER: CPT | Performed by: FAMILY MEDICINE

## 2018-06-14 PROCEDURE — 4040F PNEUMOC VAC/ADMIN/RCVD: CPT | Performed by: FAMILY MEDICINE

## 2018-06-14 PROCEDURE — 1123F ACP DISCUSS/DSCN MKR DOCD: CPT | Performed by: FAMILY MEDICINE

## 2018-06-14 PROCEDURE — 99213 OFFICE O/P EST LOW 20 MIN: CPT | Performed by: FAMILY MEDICINE

## 2018-06-14 PROCEDURE — G8599 NO ASA/ANTIPLAT THER USE RNG: HCPCS | Performed by: FAMILY MEDICINE

## 2018-06-14 RX ORDER — CROMOLYN SODIUM 40 MG/ML
1 SOLUTION/ DROPS OPHTHALMIC 4 TIMES DAILY
Qty: 1 BOTTLE | Refills: 0 | Status: ON HOLD | OUTPATIENT
Start: 2018-06-14 | End: 2018-08-26 | Stop reason: ALTCHOICE

## 2018-07-24 RX ORDER — TADALAFIL 20 MG/1
40 TABLET ORAL DAILY
Qty: 180 TABLET | Refills: 3 | Status: CANCELLED | OUTPATIENT
Start: 2018-07-24

## 2018-07-24 NOTE — TELEPHONE ENCOUNTER
This had been prescribed by Dr. Kaelyn Gamble and he was also making sure it would be covered by her insurance. Is she not seeing him anymore? She really should continue to see him at least yearly.

## 2018-08-13 RX ORDER — FUROSEMIDE 20 MG/1
TABLET ORAL
Qty: 90 TABLET | Refills: 3 | Status: SHIPPED | OUTPATIENT
Start: 2018-08-13 | End: 2019-07-09 | Stop reason: SDUPTHER

## 2018-08-26 ENCOUNTER — HOSPITAL ENCOUNTER (INPATIENT)
Age: 83
LOS: 2 days | Discharge: HOME | DRG: 309 | End: 2018-08-28
Attending: EMERGENCY MEDICINE | Admitting: FAMILY MEDICINE
Payer: MEDICARE

## 2018-08-26 ENCOUNTER — APPOINTMENT (OUTPATIENT)
Dept: CT IMAGING | Age: 83
DRG: 309 | End: 2018-08-26
Payer: MEDICARE

## 2018-08-26 DIAGNOSIS — I48.91 NEW ONSET A-FIB (HCC): ICD-10-CM

## 2018-08-26 DIAGNOSIS — K57.92 ACUTE DIVERTICULITIS: ICD-10-CM

## 2018-08-26 DIAGNOSIS — R10.84 GENERALIZED ABDOMINAL PAIN: Primary | ICD-10-CM

## 2018-08-26 DIAGNOSIS — I48.91 ATRIAL FIBRILLATION, UNSPECIFIED TYPE (HCC): ICD-10-CM

## 2018-08-26 DIAGNOSIS — I27.0 PRIMARY PULMONARY HYPERTENSION (HCC): ICD-10-CM

## 2018-08-26 PROBLEM — R10.9 ABDOMINAL PAIN: Status: ACTIVE | Noted: 2018-08-26

## 2018-08-26 LAB
ABSOLUTE EOS #: 0.09 K/UL (ref 0–0.44)
ABSOLUTE IMMATURE GRANULOCYTE: <0.03 K/UL (ref 0–0.3)
ABSOLUTE LYMPH #: 1.81 K/UL (ref 1.1–3.7)
ABSOLUTE MONO #: 0.46 K/UL (ref 0.1–1.2)
ALBUMIN SERPL-MCNC: 3.7 G/DL (ref 3.5–5.2)
ALBUMIN/GLOBULIN RATIO: 1 (ref 1–2.5)
ALP BLD-CCNC: 111 U/L (ref 35–104)
ALT SERPL-CCNC: 12 U/L (ref 5–33)
ANION GAP SERPL CALCULATED.3IONS-SCNC: 10 MMOL/L (ref 9–17)
AST SERPL-CCNC: 28 U/L
BASOPHILS # BLD: 1 % (ref 0–2)
BASOPHILS ABSOLUTE: 0.05 K/UL (ref 0–0.2)
BILIRUB SERPL-MCNC: 0.43 MG/DL (ref 0.3–1.2)
BILIRUBIN URINE: NEGATIVE
BUN BLDV-MCNC: 24 MG/DL (ref 8–23)
BUN/CREAT BLD: 25 (ref 9–20)
CALCIUM SERPL-MCNC: 9.7 MG/DL (ref 8.6–10.4)
CHLORIDE BLD-SCNC: 103 MMOL/L (ref 98–107)
CO2: 27 MMOL/L (ref 20–31)
COLOR: YELLOW
COMMENT UA: NORMAL
CREAT SERPL-MCNC: 0.96 MG/DL (ref 0.5–0.9)
DIFFERENTIAL TYPE: ABNORMAL
EKG ATRIAL RATE: 326 BPM
EKG Q-T INTERVAL: 400 MS
EKG QRS DURATION: 88 MS
EKG QTC CALCULATION (BAZETT): 432 MS
EKG R AXIS: 106 DEGREES
EKG T AXIS: 41 DEGREES
EKG VENTRICULAR RATE: 70 BPM
EOSINOPHILS RELATIVE PERCENT: 2 % (ref 1–4)
GFR AFRICAN AMERICAN: >60 ML/MIN
GFR NON-AFRICAN AMERICAN: 55 ML/MIN
GFR SERPL CREATININE-BSD FRML MDRD: ABNORMAL ML/MIN/{1.73_M2}
GFR SERPL CREATININE-BSD FRML MDRD: ABNORMAL ML/MIN/{1.73_M2}
GLUCOSE BLD-MCNC: 112 MG/DL (ref 70–99)
GLUCOSE URINE: NEGATIVE
HCT VFR BLD CALC: 37.5 % (ref 36.3–47.1)
HEMOGLOBIN: 12.3 G/DL (ref 11.9–15.1)
HIGH SENSITIVE C-REACTIVE PROTEIN: 4.5 MG/L
IMMATURE GRANULOCYTES: 0 %
KETONES, URINE: NEGATIVE
LEUKOCYTE ESTERASE, URINE: NEGATIVE
LIPASE: 47 U/L (ref 13–60)
LYMPHOCYTES # BLD: 30 % (ref 24–43)
MCH RBC QN AUTO: 33.1 PG (ref 25.2–33.5)
MCHC RBC AUTO-ENTMCNC: 32.8 G/DL (ref 28.4–34.8)
MCV RBC AUTO: 100.8 FL (ref 82.6–102.9)
MONOCYTES # BLD: 8 % (ref 3–12)
NITRITE, URINE: NEGATIVE
NRBC AUTOMATED: 0 PER 100 WBC
PDW BLD-RTO: 14.9 % (ref 11.8–14.4)
PH UA: 6 (ref 5–9)
PLATELET # BLD: 202 K/UL (ref 138–453)
PLATELET ESTIMATE: ABNORMAL
PMV BLD AUTO: 11.4 FL (ref 8.1–13.5)
POTASSIUM SERPL-SCNC: 4.8 MMOL/L (ref 3.7–5.3)
PROTEIN UA: NEGATIVE
RBC # BLD: 3.72 M/UL (ref 3.95–5.11)
RBC # BLD: ABNORMAL 10*6/UL
SEDIMENTATION RATE, ERYTHROCYTE: 20 MM (ref 0–20)
SEG NEUTROPHILS: 59 % (ref 36–65)
SEGMENTED NEUTROPHILS ABSOLUTE COUNT: 3.67 K/UL (ref 1.5–8.1)
SODIUM BLD-SCNC: 140 MMOL/L (ref 135–144)
SPECIFIC GRAVITY UA: 1.01 (ref 1.01–1.02)
TOTAL PROTEIN: 7.3 G/DL (ref 6.4–8.3)
TURBIDITY: CLEAR
URINE HGB: NEGATIVE
UROBILINOGEN, URINE: NORMAL
WBC # BLD: 6.1 K/UL (ref 3.5–11.3)
WBC # BLD: ABNORMAL 10*3/UL

## 2018-08-26 PROCEDURE — 93005 ELECTROCARDIOGRAM TRACING: CPT

## 2018-08-26 PROCEDURE — 2580000003 HC RX 258: Performed by: FAMILY MEDICINE

## 2018-08-26 PROCEDURE — 81003 URINALYSIS AUTO W/O SCOPE: CPT

## 2018-08-26 PROCEDURE — 6370000000 HC RX 637 (ALT 250 FOR IP): Performed by: FAMILY MEDICINE

## 2018-08-26 PROCEDURE — 1200000000 HC SEMI PRIVATE

## 2018-08-26 PROCEDURE — 80053 COMPREHEN METABOLIC PANEL: CPT

## 2018-08-26 PROCEDURE — 99222 1ST HOSP IP/OBS MODERATE 55: CPT | Performed by: FAMILY MEDICINE

## 2018-08-26 PROCEDURE — 6360000002 HC RX W HCPCS: Performed by: FAMILY MEDICINE

## 2018-08-26 PROCEDURE — 87186 SC STD MICRODIL/AGAR DIL: CPT

## 2018-08-26 PROCEDURE — 99285 EMERGENCY DEPT VISIT HI MDM: CPT

## 2018-08-26 PROCEDURE — 96375 TX/PRO/DX INJ NEW DRUG ADDON: CPT

## 2018-08-26 PROCEDURE — 2500000003 HC RX 250 WO HCPCS: Performed by: FAMILY MEDICINE

## 2018-08-26 PROCEDURE — 2580000003 HC RX 258: Performed by: EMERGENCY MEDICINE

## 2018-08-26 PROCEDURE — 74177 CT ABD & PELVIS W/CONTRAST: CPT

## 2018-08-26 PROCEDURE — 6360000002 HC RX W HCPCS: Performed by: EMERGENCY MEDICINE

## 2018-08-26 PROCEDURE — 87088 URINE BACTERIA CULTURE: CPT

## 2018-08-26 PROCEDURE — 85025 COMPLETE CBC W/AUTO DIFF WBC: CPT

## 2018-08-26 PROCEDURE — 6360000004 HC RX CONTRAST MEDICATION: Performed by: EMERGENCY MEDICINE

## 2018-08-26 PROCEDURE — 96374 THER/PROPH/DIAG INJ IV PUSH: CPT

## 2018-08-26 PROCEDURE — 83690 ASSAY OF LIPASE: CPT

## 2018-08-26 PROCEDURE — 36415 COLL VENOUS BLD VENIPUNCTURE: CPT

## 2018-08-26 PROCEDURE — 86141 C-REACTIVE PROTEIN HS: CPT

## 2018-08-26 PROCEDURE — 87086 URINE CULTURE/COLONY COUNT: CPT

## 2018-08-26 PROCEDURE — 85651 RBC SED RATE NONAUTOMATED: CPT

## 2018-08-26 RX ORDER — LOSARTAN POTASSIUM 50 MG/1
100 TABLET ORAL DAILY
Status: DISCONTINUED | OUTPATIENT
Start: 2018-08-26 | End: 2018-08-28 | Stop reason: HOSPADM

## 2018-08-26 RX ORDER — FAMOTIDINE 20 MG/1
20 TABLET, FILM COATED ORAL DAILY
Status: DISCONTINUED | OUTPATIENT
Start: 2018-08-26 | End: 2018-08-28 | Stop reason: HOSPADM

## 2018-08-26 RX ORDER — MORPHINE SULFATE 2 MG/ML
2 INJECTION, SOLUTION INTRAMUSCULAR; INTRAVENOUS ONCE
Status: COMPLETED | OUTPATIENT
Start: 2018-08-26 | End: 2018-08-26

## 2018-08-26 RX ORDER — CARVEDILOL 25 MG/1
25 TABLET ORAL 2 TIMES DAILY WITH MEALS
Status: DISCONTINUED | OUTPATIENT
Start: 2018-08-26 | End: 2018-08-28 | Stop reason: HOSPADM

## 2018-08-26 RX ORDER — ONDANSETRON 2 MG/ML
4 INJECTION INTRAMUSCULAR; INTRAVENOUS EVERY 6 HOURS PRN
Status: DISCONTINUED | OUTPATIENT
Start: 2018-08-26 | End: 2018-08-28 | Stop reason: HOSPADM

## 2018-08-26 RX ORDER — ROPINIROLE 0.25 MG/1
0.5 TABLET, FILM COATED ORAL 3 TIMES DAILY
Status: DISCONTINUED | OUTPATIENT
Start: 2018-08-26 | End: 2018-08-28 | Stop reason: HOSPADM

## 2018-08-26 RX ORDER — ACETAMINOPHEN 500 MG
500 TABLET ORAL EVERY 6 HOURS PRN
Status: DISCONTINUED | OUTPATIENT
Start: 2018-08-26 | End: 2018-08-28 | Stop reason: HOSPADM

## 2018-08-26 RX ORDER — 0.9 % SODIUM CHLORIDE 0.9 %
500 INTRAVENOUS SOLUTION INTRAVENOUS ONCE
Status: COMPLETED | OUTPATIENT
Start: 2018-08-26 | End: 2018-08-26

## 2018-08-26 RX ORDER — SODIUM CHLORIDE 0.9 % (FLUSH) 0.9 %
10 SYRINGE (ML) INJECTION PRN
Status: DISCONTINUED | OUTPATIENT
Start: 2018-08-26 | End: 2018-08-28 | Stop reason: HOSPADM

## 2018-08-26 RX ORDER — MORPHINE SULFATE 2 MG/ML
4 INJECTION, SOLUTION INTRAMUSCULAR; INTRAVENOUS
Status: DISCONTINUED | OUTPATIENT
Start: 2018-08-26 | End: 2018-08-28 | Stop reason: HOSPADM

## 2018-08-26 RX ORDER — PANTOPRAZOLE SODIUM 40 MG/1
40 TABLET, DELAYED RELEASE ORAL
Status: DISCONTINUED | OUTPATIENT
Start: 2018-08-27 | End: 2018-08-28 | Stop reason: HOSPADM

## 2018-08-26 RX ORDER — TADALAFIL 20 MG/1
40 TABLET ORAL DAILY
Status: DISCONTINUED | OUTPATIENT
Start: 2018-08-26 | End: 2018-08-28 | Stop reason: HOSPADM

## 2018-08-26 RX ORDER — FUROSEMIDE 20 MG/1
20 TABLET ORAL DAILY
Status: DISCONTINUED | OUTPATIENT
Start: 2018-08-26 | End: 2018-08-28 | Stop reason: HOSPADM

## 2018-08-26 RX ORDER — ONDANSETRON 2 MG/ML
4 INJECTION INTRAMUSCULAR; INTRAVENOUS EVERY 30 MIN PRN
Status: DISCONTINUED | OUTPATIENT
Start: 2018-08-26 | End: 2018-08-26 | Stop reason: ALTCHOICE

## 2018-08-26 RX ORDER — SODIUM CHLORIDE 0.9 % (FLUSH) 0.9 %
10 SYRINGE (ML) INJECTION EVERY 12 HOURS SCHEDULED
Status: DISCONTINUED | OUTPATIENT
Start: 2018-08-26 | End: 2018-08-28 | Stop reason: HOSPADM

## 2018-08-26 RX ORDER — KETOROLAC TROMETHAMINE 15 MG/ML
15 INJECTION, SOLUTION INTRAMUSCULAR; INTRAVENOUS ONCE
Status: COMPLETED | OUTPATIENT
Start: 2018-08-26 | End: 2018-08-26

## 2018-08-26 RX ORDER — LEVOTHYROXINE SODIUM 0.1 MG/1
100 TABLET ORAL DAILY
Status: DISCONTINUED | OUTPATIENT
Start: 2018-08-26 | End: 2018-08-28 | Stop reason: HOSPADM

## 2018-08-26 RX ORDER — CIPROFLOXACIN 2 MG/ML
400 INJECTION, SOLUTION INTRAVENOUS EVERY 12 HOURS
Status: DISCONTINUED | OUTPATIENT
Start: 2018-08-26 | End: 2018-08-28

## 2018-08-26 RX ORDER — AMLODIPINE BESYLATE 5 MG/1
5 TABLET ORAL DAILY
Status: DISCONTINUED | OUTPATIENT
Start: 2018-08-26 | End: 2018-08-28 | Stop reason: HOSPADM

## 2018-08-26 RX ORDER — ACETAMINOPHEN 500 MG
500 TABLET ORAL EVERY 6 HOURS PRN
Status: ON HOLD | COMMUNITY
End: 2019-11-04

## 2018-08-26 RX ORDER — SODIUM CHLORIDE 9 MG/ML
INJECTION, SOLUTION INTRAVENOUS CONTINUOUS
Status: DISCONTINUED | OUTPATIENT
Start: 2018-08-26 | End: 2018-08-28

## 2018-08-26 RX ORDER — MORPHINE SULFATE 2 MG/ML
2 INJECTION, SOLUTION INTRAMUSCULAR; INTRAVENOUS
Status: DISCONTINUED | OUTPATIENT
Start: 2018-08-26 | End: 2018-08-28 | Stop reason: HOSPADM

## 2018-08-26 RX ORDER — ACETAMINOPHEN 325 MG/1
650 TABLET ORAL EVERY 4 HOURS PRN
Status: DISCONTINUED | OUTPATIENT
Start: 2018-08-26 | End: 2018-08-28 | Stop reason: HOSPADM

## 2018-08-26 RX ORDER — ATORVASTATIN CALCIUM 40 MG/1
40 TABLET, FILM COATED ORAL DAILY
Status: DISCONTINUED | OUTPATIENT
Start: 2018-08-26 | End: 2018-08-28 | Stop reason: HOSPADM

## 2018-08-26 RX ADMIN — MORPHINE SULFATE 2 MG: 2 INJECTION, SOLUTION INTRAMUSCULAR; INTRAVENOUS at 19:08

## 2018-08-26 RX ADMIN — SODIUM CHLORIDE: 9 INJECTION, SOLUTION INTRAVENOUS at 14:31

## 2018-08-26 RX ADMIN — MORPHINE SULFATE 2 MG: 2 INJECTION, SOLUTION INTRAMUSCULAR; INTRAVENOUS at 15:54

## 2018-08-26 RX ADMIN — SODIUM CHLORIDE 500 ML: 9 INJECTION, SOLUTION INTRAVENOUS at 10:35

## 2018-08-26 RX ADMIN — IOPAMIDOL 100 ML: 612 INJECTION, SOLUTION INTRAVENOUS at 11:15

## 2018-08-26 RX ADMIN — FAMOTIDINE 20 MG: 20 TABLET ORAL at 14:52

## 2018-08-26 RX ADMIN — CARVEDILOL 25 MG: 25 TABLET, FILM COATED ORAL at 20:40

## 2018-08-26 RX ADMIN — MORPHINE SULFATE 2 MG: 2 INJECTION, SOLUTION INTRAMUSCULAR; INTRAVENOUS at 10:49

## 2018-08-26 RX ADMIN — ONDANSETRON 4 MG: 2 INJECTION INTRAMUSCULAR; INTRAVENOUS at 10:41

## 2018-08-26 RX ADMIN — AMLODIPINE BESYLATE 5 MG: 5 TABLET ORAL at 20:40

## 2018-08-26 RX ADMIN — METRONIDAZOLE 500 MG: 500 INJECTION, SOLUTION INTRAVENOUS at 21:36

## 2018-08-26 RX ADMIN — KETOROLAC TROMETHAMINE 15 MG: 15 INJECTION, SOLUTION INTRAMUSCULAR; INTRAVENOUS at 10:45

## 2018-08-26 RX ADMIN — CIPROFLOXACIN 400 MG: 2 INJECTION, SOLUTION INTRAVENOUS at 20:17

## 2018-08-26 ASSESSMENT — PAIN DESCRIPTION - LOCATION
LOCATION: ABDOMEN

## 2018-08-26 ASSESSMENT — PAIN SCALES - GENERAL
PAINLEVEL_OUTOF10: 3
PAINLEVEL_OUTOF10: 5
PAINLEVEL_OUTOF10: 2
PAINLEVEL_OUTOF10: 5
PAINLEVEL_OUTOF10: 10
PAINLEVEL_OUTOF10: 2
PAINLEVEL_OUTOF10: 2
PAINLEVEL_OUTOF10: 10
PAINLEVEL_OUTOF10: 3
PAINLEVEL_OUTOF10: 5

## 2018-08-26 ASSESSMENT — PAIN DESCRIPTION - ORIENTATION
ORIENTATION: MID
ORIENTATION: LEFT
ORIENTATION: LOWER
ORIENTATION: LOWER

## 2018-08-26 ASSESSMENT — PAIN DESCRIPTION - DESCRIPTORS
DESCRIPTORS: CRAMPING

## 2018-08-26 ASSESSMENT — PAIN DESCRIPTION - PAIN TYPE
TYPE: ACUTE PAIN

## 2018-08-26 ASSESSMENT — ENCOUNTER SYMPTOMS
ABDOMINAL PAIN: 1
EYE PAIN: 0
SHORTNESS OF BREATH: 0

## 2018-08-26 ASSESSMENT — PAIN DESCRIPTION - FREQUENCY
FREQUENCY: CONTINUOUS
FREQUENCY: CONTINUOUS

## 2018-08-26 ASSESSMENT — PAIN DESCRIPTION - PROGRESSION: CLINICAL_PROGRESSION: GRADUALLY IMPROVING

## 2018-08-26 NOTE — PROGRESS NOTES
Writer received report via phone call at this time from [place] in [place] [id number]. Supervisor on floor with patient.

## 2018-08-26 NOTE — H&P
WEIGHT GAIN THEN TAKE 2 TABLETS ON THOSE DAYS  Patient taking differently: Take 20 mg by mouth daily TAKE 1 TABLET DAILY IF THERE IS A 1 LB WEIGHT GAIN THEN TAKE 2 TABLETS ON THOSE DAYS 8/13/18  Yes Radha George MD   amLODIPine (NORVASC) 5 MG tablet Take 1 tablet by mouth daily 5/3/18  Yes Esteban Alas MD   Tadalafil, PAH, (ADCIRCA) 20 MG tablet Take 2 tablets by mouth daily Can not take nitrates with this drug 3/12/18  Yes Esteban Alas MD   losartan (COZAAR) 100 MG tablet TAKE 1 TABLET BY MOUTH DAILY. Patient taking differently: Take 100 mg by mouth daily TAKE 1 TABLET BY MOUTH DAILY. 3/1/18  Yes Esteban Alas MD   omeprazole (PRILOSEC) 40 MG delayed release capsule TAKE ONE CAPSULE BY MOUTH DAILY 12/11/17  Yes Radha George MD   atorvastatin (LIPITOR) 40 MG tablet TAKE ONE TABLET BY MOUTH DAILY 11/17/17  Yes Radha George MD   carvedilol (COREG) 25 MG tablet TAKE ONE TABLET BY MOUTH TWICE A DAY WITH MEALS 11/6/17  Yes Radha George MD   levothyroxine (SYNTHROID) 100 MCG tablet TAKE ONE TABLET BY MOUTH DAILY 11/6/17  Yes Radha George MD   rOPINIRole (REQUIP) 0.5 MG tablet TAKE 1 TABLET BY MOUTH NIGHTLY 8/8/16  Yes Radha George MD   ferrous sulfate 325 (65 FE) MG tablet Take 1 tablet by mouth 2 times daily (with meals)  Patient taking differently: Take 325 mg by mouth 2 times daily  11/4/15  Yes Radha George MD   calcium carbonate (TUMS) 500 MG chewable tablet Take 1 tablet by mouth as needed    Yes Historical Provider, MD       Allergies:  Patient has no known allergies. Social History:   TOBACCO:   reports that she has never smoked. She has never used smokeless tobacco.  ETOH:   reports that she does not drink alcohol.        Family History:   Family History   Problem Relation Age of Onset    High Blood Pressure Mother     Heart Failure Mother     Other Mother         DJD    Stroke Father     Heart Disease Sister     High Blood Pressure Sister     High Cholesterol Sister     Cancer Sister metastatic endometrial CA, Cause of death    Other Brother         CAD       Review of Systems:    Constitutional: negative for anorexia, chills and fevers  Eyes: negative  Ears, nose, mouth, throat, and face: negative for epistaxis, hoarseness, sore mouth and sore throat  Respiratory: positive for dyspnea on exertion, negative for hemoptysis, pleurisy/chest pain and sputum  Cardiovascular: positive for lower extremity edema, negative for exertional chest pressure/discomfort, orthopnea and syncope  Gastrointestinal: positive for abdominal pain, negative for dysphagia, melena, nausea, odynophagia and vomiting  Genitourinary:negative for dysuria and hematuria  Integument/breast: negative  Hematologic/lymphatic: negative  Musculoskeletal:negative  Neurological: negative  Behavioral/Psych: negative  Endocrine: negative  Allergic/Immunologic: negative        Objective:    Vitals:   Vitals:    08/26/18 1855   BP:    Pulse:    Resp:    Temp:    SpO2: 93%     Weight: 128 lb 12.8 oz (58.4 kg)   Height: 5' 3\" (160 cm)   -----------------------------------------------------------------    Exam:  GEN:    Awake, alert and oriented x 3. mild acute distress. Well developed / well nourished. EYES:  EOMI, pupils equal   ENT:  Neck supple. No lymphadenopathy. No carotid bruit  CVS:    irreg irreg  PULM: CTA, no wheezes, rales or rhonchi except tubular sounds in left posterior egion  Did not clear with cough  ABD:    Bowels sounds normal.  Abdomen is soft. No distention. temderness noted in left lower quadrant with no guarding or regidity  EXT:   1+ and no edema bilaterally . No calf tenderness. NEURO: Motor and sensory are intact  SKIN:  No rashes. No skin lesions.     PSYCH:  Normal mood and affect  -----------------------------------------------------------------  Diagnostic Data:   · All diagnostic data was reviewed    Assessment:         Principal Problem:    Diverticulitis  Active Problems:    ASHD (arteriosclerotic heart disease)    Hypothyroidism    Primary pulmonary hypertension (HCC)    Essential hypertension    Iron deficiency anemia    Abdominal pain    New onset a-fib ([address]    Acute diverticulitis  Resolved Problems:    * No resolved hospital problems.  *      Plan:     · This patient requires inpatient admission because of probable diverticulitis and advanced age suggests her need to aggressively treat her as she does not complain till usually late in disease state  · Factors affecting the medical complexity of this patient include a fib new onset  Advanced age  · Estimated length of stay is 3 days  ·     Rosey Arias MD

## 2018-08-26 NOTE — ED NOTES
Patient assisted up to bedside commode. Patient still complaining of 10/10 pain.  Dr Teresa Ordaz notified      Antonia Abreu  08/26/18 1024

## 2018-08-26 NOTE — ED PROVIDER NOTES
(LASIX) 20 MG tablet TAKE 1 TABLET DAILY IF THERE IS A 1 LB WEIGHT GAIN THEN TAKE 2 TABLETS ON THOSE DAYS  Qty: 90 tablet, Refills: 3      amLODIPine (NORVASC) 5 MG tablet Take 1 tablet by mouth daily  Qty: 90 tablet, Refills: 3      Tadalafil, PAH, (ADCIRCA) 20 MG tablet Take 2 tablets by mouth daily Can not take nitrates with this drug  Qty: 180 tablet, Refills: 3      losartan (COZAAR) 100 MG tablet TAKE 1 TABLET BY MOUTH DAILY. Qty: 90 tablet, Refills: 3      omeprazole (PRILOSEC) 40 MG delayed release capsule TAKE ONE CAPSULE BY MOUTH DAILY  Qty: 90 capsule, Refills: 3      atorvastatin (LIPITOR) 40 MG tablet TAKE ONE TABLET BY MOUTH DAILY  Qty: 90 tablet, Refills: 3      carvedilol (COREG) 25 MG tablet TAKE ONE TABLET BY MOUTH TWICE A DAY WITH MEALS  Qty: 180 tablet, Refills: 3      levothyroxine (SYNTHROID) 100 MCG tablet TAKE ONE TABLET BY MOUTH DAILY  Qty: 90 tablet, Refills: 3      rOPINIRole (REQUIP) 0.5 MG tablet TAKE 1 TABLET BY MOUTH NIGHTLY  Qty: 90 tablet, Refills: 2      ferrous sulfate 325 (65 FE) MG tablet Take 1 tablet by mouth 2 times daily (with meals)  Qty: 30 tablet, Refills: 3      calcium carbonate (TUMS) 500 MG chewable tablet Take 1 tablet by mouth as needed              ALLERGIES     Patient has no known allergies. FAMILY HISTORY       Family History   Problem Relation Age of Onset    High Blood Pressure Mother     Heart Failure Mother     Other Mother         DJD    Stroke Father     Heart Disease Sister     High Blood Pressure Sister     High Cholesterol Sister     Cancer Sister         metastatic endometrial CA, Cause of death    Other Brother         CAD        SOCIAL HISTORY       Social History     Social History    Marital status:       Spouse name: N/A    Number of children: N/A    Years of education: N/A     Occupational History    Retired      [employer]. high     Social History Main Topics    Smoking status: Never Smoker    Smokeless tobacco: Never Used    Alcohol use No    Drug use: Unknown    Sexual activity: Not Asked     Other Topics Concern    None     Social History Narrative    None       REVIEW OF SYSTEMS       Review of Systems   Constitutional: Negative for fever. HENT: Negative for congestion. Eyes: Negative for pain. Respiratory: Negative for shortness of breath. Cardiovascular: Negative for chest pain. Gastrointestinal: Positive for abdominal pain. Genitourinary: Negative for dysuria. Skin: Negative for rash. Allergic/Immunologic: Negative for immunocompromised state. Neurological: Negative for headaches. PHYSICAL EXAM    (up to 7 for level 4, 8 or more for level 5)     ED Triage Vitals [08/26/18 0853]   BP Temp Temp Source Pulse Resp SpO2 Height Weight   (!) 168/112 98.1 °F (36.7 °C) Tympanic 79 18 93 % 5' 3\" (1.6 m) 120 lb (54.4 kg)       Physical Exam   Constitutional: She is oriented to person, place, and time. She appears well-developed. No distress. HENT:   Head: Normocephalic and atraumatic. Eyes: Conjunctivae are normal. Right eye exhibits no discharge. Left eye exhibits no discharge. Neck: Neck supple. Cardiovascular: Normal rate and regular rhythm. Pulmonary/Chest: Effort normal. No stridor. No respiratory distress. Abdominal: Soft. She exhibits no distension. There is tenderness. Musculoskeletal: She exhibits edema (1+ edema bilateral lower extremities). She exhibits no tenderness. Neurological: She is alert and oriented to person, place, and time. Skin: Skin is warm and dry. No erythema. Psychiatric: She has a normal mood and affect. Nursing note and vitals reviewed. DIAGNOSTIC RESULTS     RADIOLOGY: (none if blank)   Interpretation per the Radiologist below, if available at the time of this note:    CT ABDOMEN PELVIS W IV CONTRAST Additional Contrast? None   Final Result   Impression:     Small right pleural effusion with adjacent area of compressive atelectasis.       Large hiatal hernia with mass-effect on the enlarged heart. Nonspecific gallbladder findings. Correlation with gallbladder ultrasound for cholecystitis is recommended. Possible mild wall thickening of the sigmoid colon. Findings may represent collapsed colon or mild and or chronic diverticulitis/colitis. No perforation. No abscess. Electronically Signed By: Shayan Serrato   on  08/26/2018  12:19          LABS:  Labs Reviewed   CBC WITH AUTO DIFFERENTIAL - Abnormal; Notable for the following:        Result Value    RBC 3.72 (*)     RDW 14.9 (*)     All other components within normal limits   COMPREHENSIVE METABOLIC PANEL W/ REFLEX TO MG FOR LOW K - Abnormal; Notable for the following:     Glucose 112 (*)     BUN 24 (*)     CREATININE 0.96 (*)     Bun/Cre Ratio 25 (*)     Alkaline Phosphatase 111 (*)     GFR Non- 55 (*)     All other components within normal limits   URINE CULTURE   LIPASE   URINALYSIS   COMPREHENSIVE METABOLIC PANEL W/ REFLEX TO MG FOR LOW K   CBC       All other labs were within normal range or not returned as of this dictation. EMERGENCY DEPARTMENT COURSE and Medical Decision Making: On evaluation, patient is complaining of abdominal pain with abdominal tenderness, CT and pelvis was ordered. Multiple laboratory studies were obtained. Patient did not want any opiate medication at this time so small dose of Toradol was given. Normal saline bolus was started. Patient did have irregular rhythm on monitors EKG was obtained. EKG does show atrial fibrillation which is rate controlled. This is new for the patient according to her and her daughter. Patient's pain was not improved with Toradol. The patient was given low dose of morphine. CBC shows no acute abnormality. CMP does show minimally elevated creatinine of 0.96.     CT abdomen and pelvis was read by the radiologist and shows small right pleural effusion, large hiatal hernia, nonspecific gallbladder findings. There is also possible mild wall thickening of sigmoid colon which may represent collapsed colon or mild or chronic diverticulitis/colitis. No perforation. No abscess. On reevaluation, patient states her pain is completely resolved. The patient wants to go home at this time as she states she does not like to be admitted to the hospital and would not want surgery even if needed. After discussion with Dr. Palak Maharaj, patient's PCP, and the patient, patient does agree to be admitted to the hospital for observation due to new onset atrial fibrillation along with episodic severe abdominal pain. Possibility of mesenteric ischemia was discussed with the patient though unlikely with her having episodic severe abdominal pain with minimal abdominal tenderness along with atrial fibrillation. Dr. Esquivel, patient's PCP, did agree to admit the patient for observation. The patient was admitted to the floor in stable condition and did not have any current pain.     ED Medications administered this visit:    Medications   0.9 % sodium chloride infusion ( Intravenous New Bag 8/26/18 1431)   sodium chloride flush 0.9 % injection 10 mL (not administered)   sodium chloride flush 0.9 % injection 10 mL (not administered)   magnesium hydroxide (MILK OF MAGNESIA) 400 MG/5ML suspension 30 mL (not administered)   ondansetron (ZOFRAN) injection 4 mg (not administered)   famotidine (PEPCID) tablet 20 mg (20 mg Oral Given 8/26/18 1452)   acetaminophen (TYLENOL) tablet 650 mg (not administered)   morphine injection 2 mg (2 mg Intravenous Given 8/26/18 1554)     Or   morphine injection 4 mg ( Intravenous See Alternative 8/26/18 1554)   0.9 % sodium chloride bolus (0 mLs Intravenous Stopped 8/26/18 1310)   ketorolac (TORADOL) injection 15 mg (15 mg Intravenous Given 8/26/18 1045)   morphine injection 2 mg (2 mg Intravenous Given 8/26/18 1049)   iopamidol (ISOVUE-300) 61 % injection 100 mL (100 mLs Intravenous Given 8/26/18 1115)

## 2018-08-26 NOTE — ED NOTES
To CT scan via cart. Zofran, Toradol and Morphine given. Still C/O cramping abdominal pain #10/10.      Nathaniel Lewis RN  08/26/18 6076

## 2018-08-27 ENCOUNTER — APPOINTMENT (OUTPATIENT)
Dept: ULTRASOUND IMAGING | Age: 83
DRG: 309 | End: 2018-08-27
Payer: MEDICARE

## 2018-08-27 LAB
ABSOLUTE EOS #: <0.03 K/UL (ref 0–0.44)
ABSOLUTE IMMATURE GRANULOCYTE: <0.03 K/UL (ref 0–0.3)
ABSOLUTE LYMPH #: 1.02 K/UL (ref 1.1–3.7)
ABSOLUTE MONO #: 0.48 K/UL (ref 0.1–1.2)
ALBUMIN SERPL-MCNC: 3.4 G/DL (ref 3.5–5.2)
ALBUMIN/GLOBULIN RATIO: 1.3 (ref 1–2.5)
ALP BLD-CCNC: 91 U/L (ref 35–104)
ALT SERPL-CCNC: 8 U/L (ref 5–33)
ANION GAP SERPL CALCULATED.3IONS-SCNC: 11 MMOL/L (ref 9–17)
AST SERPL-CCNC: 16 U/L
BASOPHILS # BLD: 1 % (ref 0–2)
BASOPHILS ABSOLUTE: 0.05 K/UL (ref 0–0.2)
BILIRUB SERPL-MCNC: 0.32 MG/DL (ref 0.3–1.2)
BUN BLDV-MCNC: 23 MG/DL (ref 8–23)
BUN/CREAT BLD: 22 (ref 9–20)
CALCIUM SERPL-MCNC: 8.9 MG/DL (ref 8.6–10.4)
CHLORIDE BLD-SCNC: 103 MMOL/L (ref 98–107)
CO2: 25 MMOL/L (ref 20–31)
CREAT SERPL-MCNC: 1.03 MG/DL (ref 0.5–0.9)
CULTURE: ABNORMAL
DIFFERENTIAL TYPE: ABNORMAL
EKG ATRIAL RATE: 326 BPM
EKG Q-T INTERVAL: 404 MS
EKG QRS DURATION: 86 MS
EKG QTC CALCULATION (BAZETT): 413 MS
EKG R AXIS: 101 DEGREES
EKG T AXIS: 60 DEGREES
EKG VENTRICULAR RATE: 63 BPM
EOSINOPHILS RELATIVE PERCENT: 0 % (ref 1–4)
GFR AFRICAN AMERICAN: >60 ML/MIN
GFR NON-AFRICAN AMERICAN: 50 ML/MIN
GFR SERPL CREATININE-BSD FRML MDRD: ABNORMAL ML/MIN/{1.73_M2}
GFR SERPL CREATININE-BSD FRML MDRD: ABNORMAL ML/MIN/{1.73_M2}
GLUCOSE BLD-MCNC: 128 MG/DL (ref 70–99)
HCT VFR BLD CALC: 34.9 % (ref 36.3–47.1)
HEMOGLOBIN: 10.6 G/DL (ref 11.9–15.1)
IMMATURE GRANULOCYTES: 0 %
LYMPHOCYTES # BLD: 17 % (ref 24–43)
Lab: ABNORMAL
MCH RBC QN AUTO: 32.5 PG (ref 25.2–33.5)
MCHC RBC AUTO-ENTMCNC: 30.4 G/DL (ref 28.4–34.8)
MCV RBC AUTO: 107.1 FL (ref 82.6–102.9)
MONOCYTES # BLD: 8 % (ref 3–12)
NRBC AUTOMATED: 0 PER 100 WBC
ORGANISM: ABNORMAL
PDW BLD-RTO: 15.1 % (ref 11.8–14.4)
PLATELET # BLD: 191 K/UL (ref 138–453)
PLATELET ESTIMATE: ABNORMAL
PMV BLD AUTO: 10.9 FL (ref 8.1–13.5)
POTASSIUM SERPL-SCNC: 4.6 MMOL/L (ref 3.7–5.3)
PROCALCITONIN: 0.06 NG/ML
RBC # BLD: 3.26 M/UL (ref 3.95–5.11)
RBC # BLD: ABNORMAL 10*6/UL
SEG NEUTROPHILS: 75 % (ref 36–65)
SEGMENTED NEUTROPHILS ABSOLUTE COUNT: 4.57 K/UL (ref 1.5–8.1)
SODIUM BLD-SCNC: 139 MMOL/L (ref 135–144)
SPECIMEN DESCRIPTION: ABNORMAL
STATUS: ABNORMAL
TOTAL PROTEIN: 6 G/DL (ref 6.4–8.3)
WBC # BLD: 6.1 K/UL (ref 3.5–11.3)
WBC # BLD: ABNORMAL 10*3/UL

## 2018-08-27 PROCEDURE — 85025 COMPLETE CBC W/AUTO DIFF WBC: CPT

## 2018-08-27 PROCEDURE — 6370000000 HC RX 637 (ALT 250 FOR IP): Performed by: FAMILY MEDICINE

## 2018-08-27 PROCEDURE — 1200000000 HC SEMI PRIVATE

## 2018-08-27 PROCEDURE — 99232 SBSQ HOSP IP/OBS MODERATE 35: CPT | Performed by: FAMILY MEDICINE

## 2018-08-27 PROCEDURE — G8979 MOBILITY GOAL STATUS: HCPCS

## 2018-08-27 PROCEDURE — G8987 SELF CARE CURRENT STATUS: HCPCS

## 2018-08-27 PROCEDURE — 93005 ELECTROCARDIOGRAM TRACING: CPT

## 2018-08-27 PROCEDURE — 76705 ECHO EXAM OF ABDOMEN: CPT

## 2018-08-27 PROCEDURE — 2500000003 HC RX 250 WO HCPCS: Performed by: FAMILY MEDICINE

## 2018-08-27 PROCEDURE — 97530 THERAPEUTIC ACTIVITIES: CPT

## 2018-08-27 PROCEDURE — 2580000003 HC RX 258: Performed by: FAMILY MEDICINE

## 2018-08-27 PROCEDURE — 6360000002 HC RX W HCPCS: Performed by: FAMILY MEDICINE

## 2018-08-27 PROCEDURE — G8988 SELF CARE GOAL STATUS: HCPCS

## 2018-08-27 PROCEDURE — 97166 OT EVAL MOD COMPLEX 45 MIN: CPT

## 2018-08-27 PROCEDURE — 97116 GAIT TRAINING THERAPY: CPT

## 2018-08-27 PROCEDURE — 84145 PROCALCITONIN (PCT): CPT

## 2018-08-27 PROCEDURE — 80053 COMPREHEN METABOLIC PANEL: CPT

## 2018-08-27 PROCEDURE — 97162 PT EVAL MOD COMPLEX 30 MIN: CPT

## 2018-08-27 PROCEDURE — 97110 THERAPEUTIC EXERCISES: CPT

## 2018-08-27 PROCEDURE — 99222 1ST HOSP IP/OBS MODERATE 55: CPT | Performed by: FAMILY MEDICINE

## 2018-08-27 PROCEDURE — G8978 MOBILITY CURRENT STATUS: HCPCS

## 2018-08-27 PROCEDURE — 36415 COLL VENOUS BLD VENIPUNCTURE: CPT

## 2018-08-27 RX ADMIN — ROPINIROLE HYDROCHLORIDE 0.5 MG: 0.25 TABLET, FILM COATED ORAL at 09:26

## 2018-08-27 RX ADMIN — ATORVASTATIN CALCIUM 40 MG: 40 TABLET, FILM COATED ORAL at 09:27

## 2018-08-27 RX ADMIN — CIPROFLOXACIN 400 MG: 2 INJECTION, SOLUTION INTRAVENOUS at 09:26

## 2018-08-27 RX ADMIN — LOSARTAN POTASSIUM 100 MG: 50 TABLET ORAL at 09:26

## 2018-08-27 RX ADMIN — FAMOTIDINE 20 MG: 20 TABLET ORAL at 09:27

## 2018-08-27 RX ADMIN — CARVEDILOL 25 MG: 25 TABLET, FILM COATED ORAL at 09:26

## 2018-08-27 RX ADMIN — SODIUM CHLORIDE: 9 INJECTION, SOLUTION INTRAVENOUS at 23:24

## 2018-08-27 RX ADMIN — METRONIDAZOLE 500 MG: 500 INJECTION, SOLUTION INTRAVENOUS at 14:08

## 2018-08-27 RX ADMIN — AMLODIPINE BESYLATE 5 MG: 5 TABLET ORAL at 09:26

## 2018-08-27 RX ADMIN — CARVEDILOL 25 MG: 25 TABLET, FILM COATED ORAL at 16:40

## 2018-08-27 RX ADMIN — METRONIDAZOLE 500 MG: 500 INJECTION, SOLUTION INTRAVENOUS at 05:20

## 2018-08-27 RX ADMIN — METRONIDAZOLE 500 MG: 500 INJECTION, SOLUTION INTRAVENOUS at 21:33

## 2018-08-27 RX ADMIN — SODIUM CHLORIDE: 9 INJECTION, SOLUTION INTRAVENOUS at 05:20

## 2018-08-27 RX ADMIN — CIPROFLOXACIN 400 MG: 2 INJECTION, SOLUTION INTRAVENOUS at 19:55

## 2018-08-27 RX ADMIN — FUROSEMIDE 20 MG: 20 TABLET ORAL at 09:27

## 2018-08-27 RX ADMIN — ROPINIROLE HYDROCHLORIDE 0.5 MG: 0.25 TABLET, FILM COATED ORAL at 14:10

## 2018-08-27 RX ADMIN — ENOXAPARIN SODIUM 40 MG: 40 INJECTION SUBCUTANEOUS at 14:09

## 2018-08-27 RX ADMIN — ONDANSETRON 4 MG: 2 INJECTION INTRAMUSCULAR; INTRAVENOUS at 19:00

## 2018-08-27 RX ADMIN — TADALAFIL 40 MG: 20 TABLET ORAL at 09:58

## 2018-08-27 RX ADMIN — LEVOTHYROXINE SODIUM 100 MCG: 100 TABLET ORAL at 09:27

## 2018-08-27 ASSESSMENT — PAIN SCALES - GENERAL
PAINLEVEL_OUTOF10: 0

## 2018-08-27 NOTE — PROGRESS NOTES
Occupational Therapy   Occupational Therapy Initial Assessment  Date: 2018   Patient Name: Anayeli Nagel  MRN: [id number]     : 10/13/1927    Date of Service: 2018    Discharge Recommendations:  Continue to assess pending progress       Patient Diagnosis(es): The primary encounter diagnosis was Generalized abdominal pain. A diagnosis of Atrial fibrillation, unspecified type [facility]) was also pertinent to this visit. has a past medical history of Abnormal echocardiogram; Abnormal resting ECG findings; Allergic rhinitis; Anxiety; ASHD (arteriosclerotic heart disease); ASHD (arteriosclerotic heart disease); Diastolic congestive heart failure ([address] Elevated liver enzymes; GERD (gastroesophageal reflux disease); H/O echocardiogram; H/O echocardiogram; Hiatal hernia; History of echocardiogram; History of stress test; Hyperglycemia; Hyperlipidemia; Hypertension; Hyperthyroidism; Kidney stone; Menopause; Osteoarthritis; PAH (pulmonary artery hypertension) ([address] Pulmonary hypertension ([address] Status post right heart catheterization; Tricuspid regurgitation; and Unspecified hypothyroidism. has a past surgical history that includes eye surgery (Bilateral, 10/2014); Cataract removal (10/2015); and Cardiac catheterization (Left, 1997). Restrictions  Restrictions/Precautions  Restrictions/Precautions: General Precautions, Fall Risk    Subjective   General  Patient assessed for rehabilitation services?: Yes  Diagnosis: Abdominal Pain  General Comment  Comments: Pt. denies any c/o pain.   Pain Assessment  Patient Currently in Pain: Denies    Oxygen Therapy  SpO2: 92 %  Pulse Oximeter Device Mode: Intermittent  Pulse Oximeter Device Location: Right;Finger  O2 Device: Nasal cannula  O2 Flow Rate (L/min): 2 L/min      Social/Functional History  Social/Functional History  Lives With: Alone  Type of Home: House  Bathroom Shower/Tub: Tub/Shower unit  Bathroom Equipment: Grab bars in shower, Shower chair  Home

## 2018-08-27 NOTE — PROGRESS NOTES
Physical Therapy  Facility/Department: Critical access hospital AT THE [facility]  Daily Treatment Note  NAME: Prerna Carmichael  : 10/13/1927  MRN: [id number]    Date of Service: 2018    Discharge Recommendations:  Continue to assess pending progress        Patient Diagnosis(es): The primary encounter diagnosis was Generalized abdominal pain. A diagnosis of Atrial fibrillation, unspecified type [facility]) was also pertinent to this visit. has a past medical history of Abnormal echocardiogram; Abnormal resting ECG findings; Allergic rhinitis; Anxiety; ASHD (arteriosclerotic heart disease); ASHD (arteriosclerotic heart disease); Diastolic congestive heart failure ([address] Elevated liver enzymes; GERD (gastroesophageal reflux disease); H/O echocardiogram; H/O echocardiogram; Hiatal hernia; History of echocardiogram; History of stress test; Hyperglycemia; Hyperlipidemia; Hypertension; Hyperthyroidism; Kidney stone; Menopause; Osteoarthritis; PAH (pulmonary artery hypertension) [address] [address] Pulmonary hypertension ([address] Status post right heart catheterization; Tricuspid regurgitation; and Unspecified hypothyroidism. has a past surgical history that includes eye surgery (Bilateral, 10/2014); Cataract removal (10/2015); and Cardiac catheterization (Left, 1997). Restrictions  Restrictions/Precautions  Restrictions/Precautions: General Precautions, Fall Risk  Subjective   General  Chart Reviewed: Yes  Subjective  Subjective: Pt with no pain. Orientation  Orientation  Overall Orientation Status: Within Normal Limits  Objective   Bed mobility  Supine to Sit: Contact guard assistance  Sit to Supine: Contact guard assistance  Scooting: Contact guard assistance  Transfers  Sit to Stand: Contact guard assistance  Stand to sit: Contact guard assistance  Comment: Minimal cues for technique.    Ambulation  Ambulation?: Yes  WB Status: unrestricted   Ambulation 1  Surface: level tile  Device: Single point cane  Other Apparatus: O2 (2L)  Assistance: Contact guard assistance  Distance: 60 feet x 1  Comments: Verbal cues for posture. SPO2 90% after amb with 2L O2. Balance  Posture: Fair  Sitting - Static: Good  Sitting - Dynamic: Good  Standing - Static: Fair  Standing - Dynamic: Fair  Exercises  Straight Leg Raise: 15x  Quad Sets: 15x  Heelslides: 15x  Gluteal Sets: 15x  Hip Flexion: 15x  Hip Abduction: 15x  Knee Long Arc Quad: 15x  Knee Short Arc Quad: 15x  Ankle Pumps: 15x  Comments: Above exercises completed in seated/supine. Assessment      Patient Education: Educated pt on safety with transfers/amb. Pt with fair to good understanding. Issued discharge folder with pt verbalizing good understanding. REQUIRES PT FOLLOW UP: Yes  Activity Tolerance  Activity Tolerance: Patient Tolerated treatment well     G-Code  PT G-Codes  Functional Limitation: Mobility: Walking and moving around  Mobility: Walking and Moving Around Current Status (): At least 20 percent but less than 40 percent impaired, limited or restricted  Mobility: Walking and Moving Around Goal Status ():  At least 1 percent but less than 20 percent impaired, limited or restricted  OutComes Score    AM-PAC Score    Goals  Short term goals  Time Frame for Short term goals: 10 days   Short term goal 1: Pt will be educated on her POC  Short term goal 2: Pt will perform all transfers SBA in order to increase independence   Short term goal 3: Pt will ambulate 200 feet with SPC SBA in orde rto return to PLOF  Short term goal 4: Pt will increase dynamic standing balance to Good in order to reduce fall risk   Patient Goals   Patient goals : \"to get better and go home\"    Plan    Plan  Times per week: 7x/wk   Times per day: Twice a day  Plan weeks: 2x/daily except weekends 1x/daily   Current Treatment Recommendations: Strengthening, Neuromuscular Re-education, Home Exercise Program, Safety Education & Training, Balance Training, Endurance Training, Functional Mobility Training, Transfer Training, Gait Training  Safety Devices  Type of devices:  All fall risk precautions in place, Bed alarm in place, Call light within reach, Nurse notified, Gait belt, Left in bed     Therapy Time   Individual Concurrent Group Co-treatment   Time In 1305         Time Out [place], [place]

## 2018-08-27 NOTE — PROGRESS NOTES
(L) 08/27/2018    LABALBU 3.4 (L) 08/27/2018    BILITOT 0.32 08/27/2018    ALKPHOS 91 08/27/2018    AST 16 08/27/2018    ALT 8 08/27/2018    LABGLOM 50 (L) 08/27/2018    GFRAA >60 08/27/2018     Lab Results   Component Value Date    LABA1C 5.6 05/26/2017     Lab Results   Component Value Date     05/26/2017     Lab Results   Component Value Date    VITD25 47.0 04/15/2013     Estimated Intake vs Estimated Needs: Intake Less Than Needs    Nutrition Risk Level: Moderate    Lower oral intakes with abdominal pain, also chronically smaller intakes per interview. Willing to try some Ensure (instructed to use after solids at meals). Her current weight is 2# higher than recent values in chart, likely reflecting fluid retention. Suspect admission weight was a stated value.      Nutrition Interventions:   Continue current diet, Start ONS  Continued Inpatient Monitoring, Coordination of Care, Education not appropriate at this time    Nutrition Evaluation:   · Evaluation: Goals set   · Goals: PO>75% meals and supplement    · Monitoring: Meal Intake, Ascites/Edema, Weight, Pertinent Labs, Supplement Intake    Electronically signed by Joe Zimmer RD, LD on 8/27/18 at 12:10 PM    Contact Number: [id number]

## 2018-08-27 NOTE — CONSULTS
by mouth daily TAKE 1 TABLET BY MOUTH DAILY. 3/1/18  Yes Sofie Ron MD   omeprazole (PRILOSEC) 40 MG delayed release capsule TAKE ONE CAPSULE BY MOUTH DAILY 12/11/17  Yes Wilson Carmen MD   atorvastatin (LIPITOR) 40 MG tablet TAKE ONE TABLET BY MOUTH DAILY 11/17/17  Yes Wilson Carmen MD   carvedilol (COREG) 25 MG tablet TAKE ONE TABLET BY MOUTH TWICE A DAY WITH MEALS 11/6/17  Yes Wilson Carmen MD   levothyroxine (SYNTHROID) 100 MCG tablet TAKE ONE TABLET BY MOUTH DAILY 11/6/17  Yes Wilsno Carmen MD   rOPINIRole (REQUIP) 0.5 MG tablet TAKE 1 TABLET BY MOUTH NIGHTLY 8/8/16  Yes Wilson Carmen MD   ferrous sulfate 325 (65 FE) MG tablet Take 1 tablet by mouth 2 times daily (with meals)  Patient taking differently: Take 325 mg by mouth 2 times daily  11/4/15  Yes Wilson Carmen MD   calcium carbonate (TUMS) 500 MG chewable tablet Take 1 tablet by mouth as needed    Yes Historical Provider, MD       FAMILY HISTORY: family history includes Cancer in her sister; Heart Disease in her sister; Heart Failure in her mother; High Blood Pressure in her mother and sister; High Cholesterol in her sister; Other in her brother and mother; Stroke in her father. PHYSICAL EXAM:   BP (!) 103/59   Pulse 76   Temp 97.9 °F (36.6 °C) (Temporal)   Resp 18   Ht 5' 3\" (1.6 m)   Wt 131 lb (59.4 kg)   SpO2 90%   BMI 23.21 kg/m²  Body mass index is 23.21 kg/m². Constitutional: She is oriented to person, place, and time. She appears well-developed and well-nourished. In no acute distress. HEENT: Normocephalic and atraumatic. No JVD present. Carotid bruit is not present. No mass and no thyromegaly present. No lymphadenopathy present. Cardiovascular: Normal rate, irregularly irregular rhythm, normal heart sounds. Exam reveals no gallop and no friction rubs. A II/VI systolic murmur was heard maximally at the apex. Pulmonary/Chest: Effort normal and breath sounds normal. No respiratory distress.  She has no wheezes, rhonchi or rales. Abdominal: Soft, non-tender. Bowel sounds and aorta are normal. She exhibits no organomegaly, mass or bruit. Extremities: 1-2+ lower extremity pitting pedal edema. 1/2 way up to the knees bilaterally No cyanosis and no clubbing. Pulses are 2+ radial and carotid pulses. 2+ dorsalis pedis and posterior tibial pulses bilaterally. EPC cuffs in place. Neurological: She is alert and oriented to person, place, and time. No evidence of gross cranial nerve deficit. Coordination appeared normal.   Skin: Skin is warm and dry. There is no rash or diaphoresis. Psychiatric: She has a normal mood and affect.  Her speech is normal and behavior is normal.      MOST RECENT LABS ON RECORD:   Lab Results   Component Value Date    WBC 6.1 08/27/2018    HGB 10.6 (L) 08/27/2018    HCT 34.9 (L) 08/27/2018     08/27/2018    CHOL 169 06/16/2015    TRIG 135 06/16/2015    HDL 59 06/16/2015    ALT 8 08/27/2018    AST 16 08/27/2018     08/27/2018    K 4.6 08/27/2018     08/27/2018    CREATININE 1.03 (H) 08/27/2018    BUN 23 08/27/2018    CO2 25 08/27/2018    TSH 1.71 02/27/2018    LABA1C 5.6 05/26/2017    LABMICR 48 (H) 05/26/2017       ASSESSMENT:  Patient Active Problem List    Diagnosis Date Noted    Anemia due to GI blood loss 05/01/2017     Priority: High    Chronic disease anemia 11/03/2015     Priority: High    Abdominal pain 08/26/2018    New onset a-fib [facility]) 08/26/2018    Diverticulitis 08/26/2018    Acute diverticulitis 08/26/2018    Reactive airway disease without complication 98/73/8248    Mitral valve insufficiency 02/27/2018    Urinary tract infection without hematuria 10/27/2017    Sepsis ([address] 10/27/2017    Pulmonary hypertension ([address] 10/02/2017    PAC (premature atrial contraction) 05/10/2017    Diverticulosis of intestine with bleeding - likely source 05/03/2017    Iron deficiency anemia 05/03/2017    Allergic rhinitis 04/17/2017    Primary pulmonary hypertension ([address] 01/30/2017 [facility]     [facility], [address] [place] [address]  Phone: [phone], Fax: [phone]     I believe that the risk of significant morbidity and mortality related to the patient's current medical conditions are: intermediate-high. The documentation recorded by the scribe, accurately and completely reflects the services I personally performed and the decisions made by me. Hayde Liang.  Kilo Clark MD, MS, F.A.C.C. 8/27/2018

## 2018-08-27 NOTE — PLAN OF CARE
Problem: Pain:  Goal: Pain level will decrease  Pain level will decrease   Outcome: Ongoing  Pain assessed every 4 hours. Patient is able to communicate with staff the need for pain interventions. Patient currently in bed with eyes closed. Will continue to monitor. Problem: Falls - Risk of:  Goal: Will remain free from falls  Will remain free from falls   Outcome: Ongoing  Patient is alert and oriented and has demonstrated the use of using the call light for assistance before getting up. Education has been provided to defer the use of the bed alarm and/or restraint free alarm as the patient has shown competency in calling for assistance and verbalizing the risk. Patient has non-skid socks on, bed in lowest position and wheels locked, side rails 2/4 and call light within reach. Will continue to monitor.

## 2018-08-27 NOTE — PLAN OF CARE
Problem: Nutrition  Goal: Optimal nutrition therapy  Outcome: Ongoing  Nutrition Problem: Inadequate oral intake  Intervention: Food and/or Nutrient Delivery: Continue current diet, Start ONS  Nutritional Goals: PO>75% meals and supplement  Ensure supplement (after meal use)

## 2018-08-28 VITALS
HEIGHT: 63 IN | OXYGEN SATURATION: 91 % | TEMPERATURE: 97.8 F | WEIGHT: 136.6 LBS | DIASTOLIC BLOOD PRESSURE: 63 MMHG | SYSTOLIC BLOOD PRESSURE: 110 MMHG | RESPIRATION RATE: 18 BRPM | HEART RATE: 82 BPM | BODY MASS INDEX: 24.2 KG/M2

## 2018-08-28 LAB
ABSOLUTE EOS #: 0.05 K/UL (ref 0–0.44)
ABSOLUTE IMMATURE GRANULOCYTE: <0.03 K/UL (ref 0–0.3)
ABSOLUTE LYMPH #: 1.41 K/UL (ref 1.1–3.7)
ABSOLUTE MONO #: 0.61 K/UL (ref 0.1–1.2)
BASOPHILS # BLD: 1 % (ref 0–2)
BASOPHILS ABSOLUTE: 0.03 K/UL (ref 0–0.2)
DIFFERENTIAL TYPE: ABNORMAL
EOSINOPHILS RELATIVE PERCENT: 1 % (ref 1–4)
HCT VFR BLD CALC: 32.4 % (ref 36.3–47.1)
HEMOGLOBIN: 10.4 G/DL (ref 11.9–15.1)
IMMATURE GRANULOCYTES: 0 %
LYMPHOCYTES # BLD: 22 % (ref 24–43)
MCH RBC QN AUTO: 33.3 PG (ref 25.2–33.5)
MCHC RBC AUTO-ENTMCNC: 32.1 G/DL (ref 28.4–34.8)
MCV RBC AUTO: 103.8 FL (ref 82.6–102.9)
MONOCYTES # BLD: 10 % (ref 3–12)
NRBC AUTOMATED: 0 PER 100 WBC
PDW BLD-RTO: 15 % (ref 11.8–14.4)
PLATELET # BLD: 170 K/UL (ref 138–453)
PLATELET ESTIMATE: ABNORMAL
PMV BLD AUTO: 11.3 FL (ref 8.1–13.5)
RBC # BLD: 3.12 M/UL (ref 3.95–5.11)
RBC # BLD: ABNORMAL 10*6/UL
SEG NEUTROPHILS: 67 % (ref 36–65)
SEGMENTED NEUTROPHILS ABSOLUTE COUNT: 4.29 K/UL (ref 1.5–8.1)
WBC # BLD: 6.4 K/UL (ref 3.5–11.3)
WBC # BLD: ABNORMAL 10*3/UL

## 2018-08-28 PROCEDURE — 99239 HOSP IP/OBS DSCHRG MGMT >30: CPT | Performed by: FAMILY MEDICINE

## 2018-08-28 PROCEDURE — 85025 COMPLETE CBC W/AUTO DIFF WBC: CPT

## 2018-08-28 PROCEDURE — 36415 COLL VENOUS BLD VENIPUNCTURE: CPT

## 2018-08-28 PROCEDURE — 6370000000 HC RX 637 (ALT 250 FOR IP): Performed by: FAMILY MEDICINE

## 2018-08-28 PROCEDURE — 2500000003 HC RX 250 WO HCPCS: Performed by: FAMILY MEDICINE

## 2018-08-28 RX ORDER — CIPROFLOXACIN 500 MG/1
500 TABLET, FILM COATED ORAL EVERY 12 HOURS SCHEDULED
Qty: 20 TABLET | Refills: 0 | Status: SHIPPED | OUTPATIENT
Start: 2018-08-28 | End: 2018-09-07

## 2018-08-28 RX ORDER — CIPROFLOXACIN 500 MG/1
500 TABLET, FILM COATED ORAL EVERY 12 HOURS SCHEDULED
Status: DISCONTINUED | OUTPATIENT
Start: 2018-08-28 | End: 2018-08-28 | Stop reason: HOSPADM

## 2018-08-28 RX ORDER — CIPROFLOXACIN 500 MG/1
500 TABLET, FILM COATED ORAL EVERY 12 HOURS SCHEDULED
Qty: 20 TABLET | Refills: 0 | Status: SHIPPED | OUTPATIENT
Start: 2018-08-28 | End: 2018-08-28 | Stop reason: SDUPTHER

## 2018-08-28 RX ADMIN — PANTOPRAZOLE SODIUM 40 MG: 40 TABLET, DELAYED RELEASE ORAL at 08:01

## 2018-08-28 RX ADMIN — CARVEDILOL 25 MG: 25 TABLET, FILM COATED ORAL at 09:12

## 2018-08-28 RX ADMIN — LOSARTAN POTASSIUM 100 MG: 50 TABLET ORAL at 09:13

## 2018-08-28 RX ADMIN — FAMOTIDINE 20 MG: 20 TABLET ORAL at 09:13

## 2018-08-28 RX ADMIN — APIXABAN 2.5 MG: 2.5 TABLET, FILM COATED ORAL at 09:13

## 2018-08-28 RX ADMIN — ATORVASTATIN CALCIUM 40 MG: 40 TABLET, FILM COATED ORAL at 09:13

## 2018-08-28 RX ADMIN — LEVOTHYROXINE SODIUM 100 MCG: 100 TABLET ORAL at 08:01

## 2018-08-28 RX ADMIN — TADALAFIL 40 MG: 20 TABLET ORAL at 09:12

## 2018-08-28 RX ADMIN — METRONIDAZOLE 500 MG: 500 INJECTION, SOLUTION INTRAVENOUS at 04:30

## 2018-08-28 RX ADMIN — ROPINIROLE HYDROCHLORIDE 0.5 MG: 0.25 TABLET, FILM COATED ORAL at 09:12

## 2018-08-28 RX ADMIN — AMLODIPINE BESYLATE 5 MG: 5 TABLET ORAL at 09:13

## 2018-08-28 RX ADMIN — CIPROFLOXACIN HYDROCHLORIDE 500 MG: 500 TABLET, FILM COATED ORAL at 09:12

## 2018-08-28 RX ADMIN — FUROSEMIDE 20 MG: 20 TABLET ORAL at 09:12

## 2018-08-28 ASSESSMENT — PAIN SCALES - GENERAL
PAINLEVEL_OUTOF10: 0

## 2018-08-28 NOTE — PLAN OF CARE
Problem: Pain:  Goal: Pain level will decrease  Pain level will decrease   Outcome: Ongoing  Pain scale of 0-10 being used to assess pain. Patient rates pain at 0/10, denies at this time. Pain will be assessed with hourly rounding and medication administered as ordered. Problem: Falls - Risk of:  Goal: Will remain free from falls  Will remain free from falls   Outcome: Ongoing  Patient is alert and oriented and has demonstrated the use of using the call light for assistance before getting up. Education has been provided to defer the use of the bed alarm and/or restraint free alarm as the patient has shown competency in calling for assistance and verbalizing the risk. Problem: Cardiac Output - Decreased:  Goal: Hemodynamic stability will improve  Hemodynamic stability will improve   Outcome: Ongoing  Telemetry monitor remain in A-Fib. Patient started on Eliquis. Cardiology following.

## 2018-08-28 NOTE — PROGRESS NOTES
Palliative Care Notes    Reason for Consult:  goals of care    Patient Active Problem List   Diagnosis    Abnormal echocardiogram    Orthostatic hypotension    Hyperlipidemia    Osteoarthritis    GERD (gastroesophageal reflux disease)    Hiatal hernia    ASHD (arteriosclerotic heart disease)    Elevated liver enzymes    Tricuspid regurgitation    Anxiety    Degeneration of lumbar or lumbosacral intervertebral disc    Anemia    Vitamin D deficiency    Hypothyroidism    Chronic disease anemia    Abnormal cardiovascular stress test    Diastolic congestive heart failure (HCC)    Urinary tract infection without hematuria    Primary pulmonary hypertension ([address]    Essential hypertension    Allergic rhinitis    Anemia due to GI blood loss    Diverticulosis of intestine with bleeding - likely source    Iron deficiency anemia    PAC (premature atrial contraction)    Pulmonary hypertension (HCC)    Urinary tract infection without hematuria    Sepsis ([address]    Mitral valve insufficiency    Reactive airway disease without complication    Abdominal pain    New onset a-fib [facility]    Diverticulitis    Acute diverticulitis       Advance Directives:  Code status: Full Code  Patient has capacity for medical decisions: yes  Health Care Power of : yes  Living Will: yes        Pain Management:  The patient is not having any pain. Symptom Management:  Are there any other symptoms that are distressing to the patient or family that needs addressed? Anxiety:  none                          Dyspnea:  none                          Fatigue:  denies                          Bladder function:  denies problems                          Bowel function:  denies problems    Other:  none     Spiritual history/needs:   notified: n/a    Palliative Performance Scale:  _x__70%  Ambulation reduced;  Some disease; Can't do normal job or work; intake normal or reduced;

## 2018-08-28 NOTE — DISCHARGE SUMMARY
Medicine Discharge Summary    Patient ID:  Vianey Joy  [id number]  10/13/1927    Admission date: 8/26/2018    Discharge date: 8/28/2018     Admitting Physician: No att. providers found     Primary Care Physician: Alma Suárez MD     Primary Discharge Diagnoses:   Patient Active Problem List    Diagnosis Date Noted    Anemia due to GI blood loss 05/01/2017     Priority: High    Chronic disease anemia 11/03/2015     Priority: High    Abdominal pain 08/26/2018    New onset a-fib [facility]) 08/26/2018    Diverticulitis 08/26/2018    Acute diverticulitis 08/26/2018    Reactive airway disease without complication 92/24/5200    Mitral valve insufficiency 02/27/2018    Urinary tract infection without hematuria 10/27/2017    Sepsis ([address] 10/27/2017    Pulmonary hypertension ([address] 10/02/2017    PAC (premature atrial contraction) 05/10/2017    Diverticulosis of intestine with bleeding - likely source 05/03/2017    Iron deficiency anemia 05/03/2017    Allergic rhinitis 04/17/2017    Primary pulmonary hypertension ([address] 01/30/2017    Essential hypertension 01/30/2017    Urinary tract infection without hematuria 27/59/8586    Diastolic congestive heart failure ([address] 05/09/2016    Abnormal cardiovascular stress test 11/06/2015    Degeneration of lumbar or lumbosacral intervertebral disc 02/12/2015    Anemia 02/12/2015    Vitamin D deficiency 02/12/2015    Hypothyroidism 02/12/2015    Hyperlipidemia     Osteoarthritis     GERD (gastroesophageal reflux disease)     Hiatal hernia     Elevated liver enzymes     Tricuspid regurgitation     Anxiety     Orthostatic hypotension 10/16/2012    Abnormal echocardiogram 05/15/2012    ASHD (arteriosclerotic heart disease) 08/01/1997       Additional Diagnoses:       Diagnosis Date    Abnormal echocardiogram 5/15/12    EF >55%. mild diastolic dysfunction.  RVSP 70 mmHg on echocardiogram. the right ventricle is moderately to severely enlarged with preserved ventricle is moderately to severely enlarged with preserved function. Severe tricuspid regurgitation.  Status post right heart catheterization 7/5/2012    RHC: Mean PA 26, PCWP 12.    Tricuspid regurgitation     MR 4/2002 ATB proph    Unspecified hypothyroidism 2/12/2015        Hospital Course: The patient was admitted for severe abdominal p;ain requiring iv morphine  No preciptating cause and not fever melena or vomitng initially  She was notably in a fib with controlled rate which was a new findeing though not sympotomatic  She as admitted for pain control and we began anitbiotics as the ct scan could not rule oout diverticulitis  . She was treated with antibiotics and pain control   Dr Vernette Sicard her for her new onset a fib and we discussed low dose eliquis for cva prevention though knowing hse has had a gi bleed in past   and improved over the course of her hospitalization. Discharge Exam:  GEN:    Awake, alert and oriented x 3. no acute distress  CVS:    RRR, no murmur, rub or gallop  PULM: CTA, no wheezes, rales or rhonchi  ABD:    Bowels sounds normal.  Abdomen is soft. No distention. No tenderness. EXT:   no edema bilaterally . No calf tenderness.      Consultants:    · Dr. Michelle Fletcher, cardiology    Procedures:    · none    Complications:   · none    Significant Diagnostic Studies:   · Discharge Labs:  Lab Results   Component Value Date    WBC 6.4 08/28/2018    HGB 10.4 (L) 08/28/2018    .8 (H) 08/28/2018     08/28/2018      Lab Results   Component Value Date    GLUCOSE 128 (H) 08/27/2018    BUN 23 08/27/2018    CREATININE 1.03 (H) 08/27/2018     08/27/2018    K 4.6 08/27/2018    CALCIUM 8.9 08/27/2018     08/27/2018    CO2 25 08/27/2018       Discharge Condition:   · good    Disposition:   · Discharge to Home    Discharge Medications:   Kalia Hickman   Home Medication Instructions NYO:[id number]    Printed on:08/28/18 8062   Medication Information acetaminophen (TYLENOL) 500 MG tablet  Take 500 mg by mouth every 6 hours as needed for Pain or Fever             amLODIPine (NORVASC) 5 MG tablet  Take 1 tablet by mouth daily             apixaban (ELIQUIS) 2.5 MG TABS tablet  Take 1 tablet by mouth 2 times daily             atorvastatin (LIPITOR) 40 MG tablet  TAKE ONE TABLET BY MOUTH DAILY             calcium carbonate (TUMS) 500 MG chewable tablet  Take 1 tablet by mouth as needed              carvedilol (COREG) 25 MG tablet  TAKE ONE TABLET BY MOUTH TWICE A DAY WITH MEALS             ciprofloxacin (CIPRO) 500 MG tablet  Take 1 tablet by mouth every 12 hours for 10 days             ferrous sulfate 325 (65 FE) MG tablet  Take 1 tablet by mouth 2 times daily (with meals)             furosemide (LASIX) 20 MG tablet  TAKE 1 TABLET DAILY IF THERE IS A 1 LB WEIGHT GAIN THEN TAKE 2 TABLETS ON THOSE DAYS             levothyroxine (SYNTHROID) 100 MCG tablet  TAKE ONE TABLET BY MOUTH DAILY             losartan (COZAAR) 100 MG tablet  TAKE 1 TABLET BY MOUTH DAILY.              omeprazole (PRILOSEC) 40 MG delayed release capsule  TAKE ONE CAPSULE BY MOUTH DAILY             rOPINIRole (REQUIP) 0.5 MG tablet  TAKE 1 TABLET BY MOUTH NIGHTLY             Tadalafil, PAH, (ADCIRCA) 20 MG tablet  Take 2 tablets by mouth daily Can not take nitrates with this drug                 Patient Instructions:   · Activity: activity as tolerated  · Diet: cardiac diet  · Wound Care: none needed  · Follow up with Brittani Ordaz MD in 1 weeks   · Follow-up with dr Ulices Caban  as directed    CORE MEASURES on Discharge (if applicable)  ACE/ARB in CHF: Yes  ASA in MI: N/A  Statin in MI: N/A  Statin in CVA: N/A  Antiplatelet in CVA: N/A    Total time spent on discharge services: 35 minutes  Including the following activities:  · Evaluation and Management of patient  · Discussion with patient and/or surrogate about current care plan  · Coordination with Case Management and/or Social Worker  · Coordination of care with Consultants (if applicable)   · Coordination of care with Receiving Facility Physician (if applicable)  · Completion of DME forms (if applicable)  · Preparation of Discharge Summary  · Preparation of Medication Reconciliation  · Preparation of Discharge Prescriptions      Signed:   Kandy Ramírez M.D.  8/28/2018  5:21 PM

## 2018-08-28 NOTE — PROGRESS NOTES
[facility]  Inpatient/Observation/Outpatient Rehabilitation    Date: 2018  Patient Name: John Arora       [x] Inpatient Acute/Observation       []  Outpatient  : 10/13/1927       Pt not seen this AM d/t being discharged. Will attempt evaluation at our earliest opportunity.           Date: 2018

## 2018-08-28 NOTE — PROGRESS NOTES
Patient choose [facility] home care from the list for her follow up care. Her  had them in the past.  Referral made and paper work fax.   CORNELIO Ortega

## 2018-08-29 ENCOUNTER — TELEPHONE (OUTPATIENT)
Dept: FAMILY MEDICINE CLINIC | Age: 83
End: 2018-08-29

## 2018-08-29 ENCOUNTER — CARE COORDINATION (OUTPATIENT)
Dept: CASE MANAGEMENT | Age: 83
End: 2018-08-29

## 2018-08-29 DIAGNOSIS — D63.8 CHRONIC DISEASE ANEMIA: Primary | Chronic | ICD-10-CM

## 2018-08-29 NOTE — TELEPHONE ENCOUNTER
Veronica Peck from [facility] called from the patient's home with concerns regarding her Cipro. Guru Cunningham stated that she was started on it yesterday and her hospital note states that it was due to her abdominal pain and suspected diverticulitis. Guru Cunningham developed nausea after starting it and was given IV treatment for her nausea while in the hospital. Now that she is home and has nothing for nausea she is not tolerating the medication well at all. Other than the nausea she has no abdominal pain and none of the symptoms that she originally presented to the ER with. Would it be ok if she stopped the Cipro?

## 2018-09-04 ENCOUNTER — CARE COORDINATION (OUTPATIENT)
Dept: CASE MANAGEMENT | Age: 83
End: 2018-09-04

## 2018-09-05 ENCOUNTER — OFFICE VISIT (OUTPATIENT)
Dept: FAMILY MEDICINE CLINIC | Age: 83
End: 2018-09-05
Payer: MEDICARE

## 2018-09-05 VITALS
DIASTOLIC BLOOD PRESSURE: 80 MMHG | WEIGHT: 128 LBS | HEIGHT: 63 IN | BODY MASS INDEX: 22.68 KG/M2 | SYSTOLIC BLOOD PRESSURE: 132 MMHG

## 2018-09-05 DIAGNOSIS — I48.91 NEW ONSET A-FIB (HCC): Primary | ICD-10-CM

## 2018-09-05 DIAGNOSIS — I10 ESSENTIAL HYPERTENSION: ICD-10-CM

## 2018-09-05 DIAGNOSIS — E78.49 OTHER HYPERLIPIDEMIA: ICD-10-CM

## 2018-09-05 DIAGNOSIS — I50.30 DIASTOLIC CONGESTIVE HEART FAILURE, UNSPECIFIED HF CHRONICITY (HCC): ICD-10-CM

## 2018-09-05 PROCEDURE — 99495 TRANSJ CARE MGMT MOD F2F 14D: CPT | Performed by: FAMILY MEDICINE

## 2018-09-05 PROCEDURE — 1111F DSCHRG MED/CURRENT MED MERGE: CPT | Performed by: FAMILY MEDICINE

## 2018-09-05 NOTE — PROGRESS NOTES
1 TABLET BY MOUTH DAILY. (Patient taking differently: Take 100 mg by mouth daily TAKE 1 TABLET BY MOUTH DAILY. ) 90 tablet 3    omeprazole (PRILOSEC) 40 MG delayed release capsule TAKE ONE CAPSULE BY MOUTH DAILY 90 capsule 3    atorvastatin (LIPITOR) 40 MG tablet TAKE ONE TABLET BY MOUTH DAILY 90 tablet 3    carvedilol (COREG) 25 MG tablet TAKE ONE TABLET BY MOUTH TWICE A DAY WITH MEALS 180 tablet 3    levothyroxine (SYNTHROID) 100 MCG tablet TAKE ONE TABLET BY MOUTH DAILY 90 tablet 3    rOPINIRole (REQUIP) 0.5 MG tablet TAKE 1 TABLET BY MOUTH NIGHTLY 90 tablet 2    ferrous sulfate 325 (65 FE) MG tablet Take 1 tablet by mouth 2 times daily (with meals) (Patient taking differently: Take 325 mg by mouth 2 times daily ) 30 tablet 3    calcium carbonate (TUMS) 500 MG chewable tablet Take 1 tablet by mouth as needed        No current facility-administered medications for this visit. ROS:  Denies abdominal pain  Denies fever, chills  Denies diarrhea, constipation    EXAM:  /80 (Site: Left Arm, Position: Sitting, Cuff Size: Medium Adult)   Ht 5' 3\" (1.6 m)   Wt 128 lb (58.1 kg)   BMI 22.67 kg/m²   Wt Readings from Last 3 Encounters:   09/05/18 128 lb (58.1 kg)   08/28/18 136 lb 9.6 oz (62 kg)   06/14/18 129 lb 6.4 oz (58.7 kg)     BP Readings from Last 3 Encounters:   09/05/18 132/80   08/28/18 110/63   06/14/18 108/74     PHYSICAL EXAM:  General Appearance: in no acute distress, well developed, well nourished. Eyes: pupils equal, round reactive to light and accommodation. Ears: normal canal and TM's. Nose: nares patent, no lesions. Oral Cavity: mucosa moist.  Throat: clear. Neck/Thyroid: neck supple, full range of motion, no cervical lymphadenopathy, no thyromegaly or carotid bruits. Skin: warm and dry. No suspicious lesions. Heart: regular rate and rhythm. No murmurs. S1, S2 normal, no gallops. Lungs: clear to auscultation bilaterally.   Abdomen: bowel sounds present, soft, nontender,

## 2018-09-05 NOTE — PROGRESS NOTES
I, Aminata Gupta [facility], am scribing for and in the presence of Dr. Katia Garcia. 9/5/18/2:36 pm [address] [facility]  [address]  [place] [address]  Dept: [phone]     HPI: Patient presents today for a hospital f/u. She was admitted on 8/26 for severe abdominal pain which required IV morphine. There were no preciptating causes and no fever, melena, or vomitng initially. She was notably in a fib with controlled rate which was a new finding though not symptomatic. She was admitted for pain control and we began anitbiotics as the ct scan could not rule out diverticulitis. She was treated with antibiotics and pain control. Dr. Fatemeh Carreon evalutated her for her new onset a fib and we discussed low dose eliquis for cva prevention though knowing she has had a gi bleed in the past and improved over the course of her hospitalization. She was discharged home on 8/28 with the diagnosis of diverticulitis.      She developed nausea from the Cipro and the abdominal pain was gone so the Cipro was stopped     Today she states she is not up to par yet.      Current Facility-Administered Medications          Current Outpatient Prescriptions   Medication Sig Dispense Refill    apixaban (ELIQUIS) 2.5 MG TABS tablet Take 1 tablet by mouth 2 times daily 60 tablet 1    ciprofloxacin (CIPRO) 500 MG tablet Take 1 tablet by mouth every 12 hours for 10 days 20 tablet 0    acetaminophen (TYLENOL) 500 MG tablet Take 500 mg by mouth every 6 hours as needed for Pain or Fever        furosemide (LASIX) 20 MG tablet TAKE 1 TABLET DAILY IF THERE IS A 1 LB WEIGHT GAIN THEN TAKE 2 TABLETS ON THOSE DAYS (Patient taking differently: Take 20 mg by mouth daily TAKE 1 TABLET DAILY IF THERE IS A 1 LB WEIGHT GAIN THEN TAKE 2 TABLETS ON THOSE DAYS) 90 tablet 3    amLODIPine (NORVASC) 5 MG tablet Take 1 tablet by mouth daily 90 tablet 3    Tadalafil, PAH, (ADCIRCA) 20 MG tablet Take 2 tablets by mouth daily Can not take nitrates with this drug 180 tablet 3    losartan (COZAAR) 100 MG tablet TAKE 1 TABLET BY MOUTH DAILY. (Patient taking differently: Take 100 mg by mouth daily TAKE 1 TABLET BY MOUTH DAILY. ) 90 tablet 3    omeprazole (PRILOSEC) 40 MG delayed release capsule TAKE ONE CAPSULE BY MOUTH DAILY 90 capsule 3    atorvastatin (LIPITOR) 40 MG tablet TAKE ONE TABLET BY MOUTH DAILY 90 tablet 3    carvedilol (COREG) 25 MG tablet TAKE ONE TABLET BY MOUTH TWICE A DAY WITH MEALS 180 tablet 3    levothyroxine (SYNTHROID) 100 MCG tablet TAKE ONE TABLET BY MOUTH DAILY 90 tablet 3    rOPINIRole (REQUIP) 0.5 MG tablet TAKE 1 TABLET BY MOUTH NIGHTLY 90 tablet 2    ferrous sulfate 325 (65 FE) MG tablet Take 1 tablet by mouth 2 times daily (with meals) (Patient taking differently: Take 325 mg by mouth 2 times daily ) 30 tablet 3    calcium carbonate (TUMS) 500 MG chewable tablet Take 1 tablet by mouth as needed           No current facility-administered medications for this visit.          ROS:  Denies abdominal pain  Denies fever, chills  Denies diarrhea, constipation  Admits SOB and fatigue     EXAM:  /80 (Site: Left Arm, Position: Sitting, Cuff Size: Medium Adult)   Ht 5' 3\" (1.6 m)   Wt 128 lb (58.1 kg)   BMI 22.67 kg/m²       Wt Readings from Last 3 Encounters:   09/05/18 128 lb (58.1 kg)   08/28/18 136 lb 9.6 oz (62 kg)   06/14/18 129 lb 6.4 oz (58.7 kg)          BP Readings from Last 3 Encounters:   09/05/18 132/80   08/28/18 110/63   06/14/18 108/74      General Appearance: in no acute distress, well developed, well nourished, ambulates with cane  Eyes: pupils equal, round reactive to light and accommodation. Ears: normal canal and TM's. Nose: nares patent, no lesions. Oral Cavity: mucosa moist.  Throat: clear. Neck/Thyroid: neck supple, full range of motion, no cervical lymphadenopathy, no thyromegaly or carotid bruits. Skin: warm and dry. No suspicious lesions.   Heart: rate 80, irregularly irregular, 2/6 systolic murmur from the apex to the axilla  Lungs: clear to auscultation bilaterally. Abdomen: bowel sounds present, soft, nontender, nondistended, no masses or organomegaly. Musculoskeletal: normal, full range of motion in knees and hips, no swelling or tenderness. Extremities: 2+ edema in ankles bilaterally  Peripheral Pulses: 2+ throughout, symetric. Neurologic: nonfocal, motor strength normal upper and lower extremities, sensory exam intact. Psych: normal affect, speech fluent. ASSESSMENT:   Diagnosis Orders   1. New onset a-fib ([address] Stable     Stable based upon symptoms and exam. Continue current treatment plan and follow up at least yearly. 2. Diastolic congestive heart failure, unspecified HF chronicity ([address]  PA DISCHARGE MEDS RECONCILED W/ CURRENT OUTPATIENT MED LIST   3. Essential hypertension  PA DISCHARGE MEDS RECONCILED W/ CURRENT OUTPATIENT MED LIST   4. Other hyperlipidemia  Basic Metabolic Panel    CBC             PLAN:  I reviewed her hospital stay with her and the importance of Eliquis to decrease her risk of stroke  For the swelling in her legs she can continue to take her Lasix twice a day until her legs are back to normal in the morning, at that point she should go back to taking it just once a day  I would like labs repeated next week  She has a regular check scheduled for next week,  I will move this out to next month since she is still getting home health care    No orders of the defined types were placed in this encounter.           I, Dr. Concha Reagan, personally performed the services described in this documentation as scribed by AVI Brown in my presence, and it is both accurate and complete. 1. New onset a-fib ([address]  Stable based upon symptoms and exam. Continue current treatment plan and follow up at least yearly.            Post-Discharge Transitional Care Management Services or Hospital Follow Up      [facility]   YOB: 1927    Date of Office Visit:  9/5/2018  Date of Hospital Admission: 8/26/18  Date of Hospital Discharge: 8/28/18  Risk of hospital readmission (high >=14%.  Medium >=10%) :Readmission Risk Score: 14    Care management risk score Rising risk (score 2-5) and Complex Care (Scores >=6): 11     Non face to face  following discharge, date last encounter closed (first attempt may have been earlier): 8/29/2018  1:39 PM    Call initiated 2 business days of discharge: Yes    Patient Active Problem List   Diagnosis    Abnormal echocardiogram    Orthostatic hypotension    Hyperlipidemia    Osteoarthritis    GERD (gastroesophageal reflux disease)    Hiatal hernia    ASHD (arteriosclerotic heart disease)    Elevated liver enzymes    Tricuspid regurgitation    Anxiety    Degeneration of lumbar or lumbosacral intervertebral disc    Anemia    Vitamin D deficiency    Hypothyroidism    Chronic disease anemia    Abnormal cardiovascular stress test    Diastolic congestive heart failure (HCC)    Urinary tract infection without hematuria    Primary pulmonary hypertension ([address]    Essential hypertension    Allergic rhinitis    Anemia due to GI blood loss    Diverticulosis of intestine with bleeding - likely source    Iron deficiency anemia    PAC (premature atrial contraction)    Pulmonary hypertension (HCC)    Urinary tract infection without hematuria    Sepsis ([address]    Mitral valve insufficiency    Reactive airway disease without complication    Abdominal pain    New onset a-fib ([address]    Diverticulitis    Acute diverticulitis       No Known Allergies    Medications listed as ordered at the time of discharge from hospital   Jessy Her   Home Medication Instructions ROSALVA:    Printed on:09/07/18 5052   Medication Information                      acetaminophen (TYLENOL) 500 MG tablet  Take 500 mg by mouth every 6 hours as needed for Pain or Fever             amLODIPine (NORVASC) 5 MG tablet  Take 1 tablet by mouth daily             apixaban (ELIQUIS) 2.5 MG TABS tablet  Take 1 tablet by mouth 2 times daily             atorvastatin (LIPITOR) 40 MG tablet  TAKE ONE TABLET BY MOUTH DAILY             calcium carbonate (TUMS) 500 MG chewable tablet  Take 1 tablet by mouth as needed              carvedilol (COREG) 25 MG tablet  TAKE ONE TABLET BY MOUTH TWICE A DAY WITH MEALS             ciprofloxacin (CIPRO) 500 MG tablet  Take 1 tablet by mouth every 12 hours for 10 days             ferrous sulfate 325 (65 FE) MG tablet  Take 1 tablet by mouth 2 times daily (with meals)             furosemide (LASIX) 20 MG tablet  TAKE 1 TABLET DAILY IF THERE IS A 1 LB WEIGHT GAIN THEN TAKE 2 TABLETS ON THOSE DAYS             levothyroxine (SYNTHROID) 100 MCG tablet  TAKE ONE TABLET BY MOUTH DAILY             losartan (COZAAR) 100 MG tablet  TAKE 1 TABLET BY MOUTH DAILY.              omeprazole (PRILOSEC) 40 MG delayed release capsule  TAKE ONE CAPSULE BY MOUTH DAILY             rOPINIRole (REQUIP) 0.5 MG tablet  TAKE 1 TABLET BY MOUTH NIGHTLY             Tadalafil, PAH, (ADCIRCA) 20 MG tablet  Take 2 tablets by mouth daily Can not take nitrates with this drug                   Medications marked \"taking\" at this time  Outpatient Prescriptions Marked as Taking for the 9/5/18 encounter (Office Visit) with Martine Carreno MD   Medication Sig Dispense Refill    apixaban (ELIQUIS) 2.5 MG TABS tablet Take 1 tablet by mouth 2 times daily 60 tablet 1    ciprofloxacin (CIPRO) 500 MG tablet Take 1 tablet by mouth every 12 hours for 10 days 20 tablet 0    acetaminophen (TYLENOL) 500 MG tablet Take 500 mg by mouth every 6 hours as needed for Pain or Fever      furosemide (LASIX) 20 MG tablet TAKE 1 TABLET DAILY IF THERE IS A 1 LB WEIGHT GAIN THEN TAKE 2 TABLETS ON THOSE DAYS (Patient taking differently: Take 20 mg by mouth daily TAKE 1 TABLET DAILY IF THERE IS A 1 LB WEIGHT GAIN THEN TAKE 2 TABLETS ON THOSE DAYS) 90 tablet 3    amLODIPine (NORVASC)

## 2018-09-11 ENCOUNTER — CARE COORDINATION (OUTPATIENT)
Dept: CASE MANAGEMENT | Age: 83
End: 2018-09-11

## 2018-09-11 NOTE — CARE COORDINATION
[address] Transitions Follow Up Call    2018    Patient: Tayler Humphrey  Patient : 10/13/1927   MRN: [id number]  Reason for Admission: There are no discharge diagnoses documented for the most recent discharge. Discharge Date: 18 RARS: Readmission Risk Score: 14       Spoke with: Attempted to contact patient for final transition call. Unable to reach, left voicemail with contact information for patient to call back or to contact PCP for any needs. Care Transitions Subsequent and Final Call    Subsequent and Final Calls  Are you currently active with any services?:  Home Health  Care Transitions Interventions  Other Interventions:             Follow Up  Future Appointments  Date Time Provider Katy Hassan   10/2/2018 9:45 AM [facility]   2018 1:20 PM MD ALIDA [facility]       Stevie Bermudez RN

## 2018-09-12 ENCOUNTER — HOSPITAL ENCOUNTER (OUTPATIENT)
Age: 83
Setting detail: SPECIMEN
Discharge: HOME | End: 2018-09-12
Payer: MEDICARE

## 2018-09-12 LAB
ANION GAP SERPL CALCULATED.3IONS-SCNC: 10 MMOL/L (ref 9–17)
BUN BLDV-MCNC: 16 MG/DL (ref 8–23)
BUN/CREAT BLD: 19 (ref 9–20)
CALCIUM SERPL-MCNC: 8.9 MG/DL (ref 8.6–10.4)
CHLORIDE BLD-SCNC: 105 MMOL/L (ref 98–107)
CO2: 29 MMOL/L (ref 20–31)
CREAT SERPL-MCNC: 0.84 MG/DL (ref 0.5–0.9)
GFR AFRICAN AMERICAN: >60 ML/MIN
GFR NON-AFRICAN AMERICAN: >60 ML/MIN
GFR SERPL CREATININE-BSD FRML MDRD: ABNORMAL ML/MIN/{1.73_M2}
GFR SERPL CREATININE-BSD FRML MDRD: ABNORMAL ML/MIN/{1.73_M2}
GLUCOSE BLD-MCNC: 105 MG/DL (ref 70–99)
HCT VFR BLD CALC: 38 % (ref 36.3–47.1)
HEMOGLOBIN: 11.9 G/DL (ref 11.9–15.1)
MCH RBC QN AUTO: 33.3 PG (ref 25.2–33.5)
MCHC RBC AUTO-ENTMCNC: 31.3 G/DL (ref 28.4–34.8)
MCV RBC AUTO: 106.4 FL (ref 82.6–102.9)
NRBC AUTOMATED: 0 PER 100 WBC
PDW BLD-RTO: 14.6 % (ref 11.8–14.4)
PLATELET # BLD: 232 K/UL (ref 138–453)
PMV BLD AUTO: 11.2 FL (ref 8.1–13.5)
POTASSIUM SERPL-SCNC: 4.1 MMOL/L (ref 3.7–5.3)
RBC # BLD: 3.57 M/UL (ref 3.95–5.11)
SODIUM BLD-SCNC: 144 MMOL/L (ref 135–144)
WBC # BLD: 5.5 K/UL (ref 3.5–11.3)

## 2018-09-12 PROCEDURE — 80048 BASIC METABOLIC PNL TOTAL CA: CPT

## 2018-09-12 PROCEDURE — 85027 COMPLETE CBC AUTOMATED: CPT

## 2018-10-02 ENCOUNTER — OFFICE VISIT (OUTPATIENT)
Dept: FAMILY MEDICINE CLINIC | Age: 83
End: 2018-10-02
Payer: MEDICARE

## 2018-10-02 ENCOUNTER — HOSPITAL ENCOUNTER (OUTPATIENT)
Age: 83
Discharge: HOME | End: 2018-10-02
Payer: MEDICARE

## 2018-10-02 ENCOUNTER — TELEPHONE (OUTPATIENT)
Dept: FAMILY MEDICINE CLINIC | Age: 83
End: 2018-10-02

## 2018-10-02 VITALS
WEIGHT: 124 LBS | HEIGHT: 63 IN | BODY MASS INDEX: 21.97 KG/M2 | DIASTOLIC BLOOD PRESSURE: 80 MMHG | SYSTOLIC BLOOD PRESSURE: 124 MMHG

## 2018-10-02 DIAGNOSIS — I27.20 PULMONARY HYPERTENSION (HCC): ICD-10-CM

## 2018-10-02 DIAGNOSIS — I50.30 DIASTOLIC CONGESTIVE HEART FAILURE, UNSPECIFIED HF CHRONICITY (HCC): ICD-10-CM

## 2018-10-02 DIAGNOSIS — I50.32 CHRONIC DIASTOLIC CONGESTIVE HEART FAILURE (HCC): ICD-10-CM

## 2018-10-02 DIAGNOSIS — I10 ESSENTIAL HYPERTENSION: Primary | ICD-10-CM

## 2018-10-02 DIAGNOSIS — Z23 NEED FOR PROPHYLACTIC VACCINATION AND INOCULATION AGAINST INFLUENZA: ICD-10-CM

## 2018-10-02 DIAGNOSIS — E87.6 LOW SERUM POTASSIUM LEVEL: Primary | ICD-10-CM

## 2018-10-02 LAB
ANION GAP SERPL CALCULATED.3IONS-SCNC: 10 MMOL/L (ref 9–17)
BNP INTERPRETATION: ABNORMAL
BUN BLDV-MCNC: 13 MG/DL (ref 8–23)
BUN/CREAT BLD: 15 (ref 9–20)
CALCIUM SERPL-MCNC: 9.1 MG/DL (ref 8.6–10.4)
CHLORIDE BLD-SCNC: 101 MMOL/L (ref 98–107)
CO2: 29 MMOL/L (ref 20–31)
CREAT SERPL-MCNC: 0.88 MG/DL (ref 0.5–0.9)
GFR AFRICAN AMERICAN: >60 ML/MIN
GFR NON-AFRICAN AMERICAN: >60 ML/MIN
GFR SERPL CREATININE-BSD FRML MDRD: ABNORMAL ML/MIN/{1.73_M2}
GFR SERPL CREATININE-BSD FRML MDRD: ABNORMAL ML/MIN/{1.73_M2}
GLUCOSE BLD-MCNC: 92 MG/DL (ref 70–99)
HCT VFR BLD CALC: 38.7 % (ref 36.3–47.1)
HEMOGLOBIN: 12.1 G/DL (ref 11.9–15.1)
MCH RBC QN AUTO: 32.9 PG (ref 25.2–33.5)
MCHC RBC AUTO-ENTMCNC: 31.3 G/DL (ref 28.4–34.8)
MCV RBC AUTO: 105.2 FL (ref 82.6–102.9)
NRBC AUTOMATED: 0 PER 100 WBC
PDW BLD-RTO: 14 % (ref 11.8–14.4)
PLATELET # BLD: 221 K/UL (ref 138–453)
PMV BLD AUTO: 11.5 FL (ref 8.1–13.5)
POTASSIUM SERPL-SCNC: 3 MMOL/L (ref 3.7–5.3)
PRO-BNP: 2581 PG/ML
RBC # BLD: 3.68 M/UL (ref 3.95–5.11)
SODIUM BLD-SCNC: 140 MMOL/L (ref 135–144)
WBC # BLD: 6.2 K/UL (ref 3.5–11.3)

## 2018-10-02 PROCEDURE — G8598 ASA/ANTIPLAT THER USED: HCPCS | Performed by: FAMILY MEDICINE

## 2018-10-02 PROCEDURE — G8482 FLU IMMUNIZE ORDER/ADMIN: HCPCS | Performed by: FAMILY MEDICINE

## 2018-10-02 PROCEDURE — 80048 BASIC METABOLIC PNL TOTAL CA: CPT

## 2018-10-02 PROCEDURE — 85027 COMPLETE CBC AUTOMATED: CPT

## 2018-10-02 PROCEDURE — 36415 COLL VENOUS BLD VENIPUNCTURE: CPT

## 2018-10-02 PROCEDURE — 1036F TOBACCO NON-USER: CPT | Performed by: FAMILY MEDICINE

## 2018-10-02 PROCEDURE — 4040F PNEUMOC VAC/ADMIN/RCVD: CPT | Performed by: FAMILY MEDICINE

## 2018-10-02 PROCEDURE — 1123F ACP DISCUSS/DSCN MKR DOCD: CPT | Performed by: FAMILY MEDICINE

## 2018-10-02 PROCEDURE — 83880 ASSAY OF NATRIURETIC PEPTIDE: CPT

## 2018-10-02 PROCEDURE — 90662 IIV NO PRSV INCREASED AG IM: CPT | Performed by: FAMILY MEDICINE

## 2018-10-02 PROCEDURE — G8427 DOCREV CUR MEDS BY ELIG CLIN: HCPCS | Performed by: FAMILY MEDICINE

## 2018-10-02 PROCEDURE — 1101F PT FALLS ASSESS-DOCD LE1/YR: CPT | Performed by: FAMILY MEDICINE

## 2018-10-02 PROCEDURE — 99214 OFFICE O/P EST MOD 30 MIN: CPT | Performed by: FAMILY MEDICINE

## 2018-10-02 PROCEDURE — 1090F PRES/ABSN URINE INCON ASSESS: CPT | Performed by: FAMILY MEDICINE

## 2018-10-02 PROCEDURE — G0008 ADMIN INFLUENZA VIRUS VAC: HCPCS | Performed by: FAMILY MEDICINE

## 2018-10-02 PROCEDURE — G8420 CALC BMI NORM PARAMETERS: HCPCS | Performed by: FAMILY MEDICINE

## 2018-10-02 RX ORDER — SPIRONOLACTONE 25 MG/1
25 TABLET ORAL DAILY
Qty: 90 TABLET | Refills: 3 | Status: SHIPPED | OUTPATIENT
Start: 2018-10-02 | End: 2019-10-03 | Stop reason: SDUPTHER

## 2018-10-02 NOTE — PROGRESS NOTES
I, Elvin Cummings, [facility], am scribing for and in the presence of Dr. Iqra Bennett. 10/2/18/10:14 am/JML    [address] [address]  [place] [address]  Dept: [phone]    Sisi Reina is a 80 y.o. female here for Follow-up; Hypertension; and Hyperlipidemia      HPI:  HARI Acevedo has a history of heartburn. Current medical therapy includes Tums, omeprazole. Aggravating factors include none.      HYPERLIPIDEMIA   Medication compliance: compliant all of the time. Patient is following a low fat, low cholesterol diet. She is exercising regularly, cleaning her house.      HYPERTENSION  She is exercising, cleaning her house and is adherent to a low-salt diet. Blood pressure is not being monitored at home. Cardiac symptoms: none. Patient denies: chest pain and palpitations. Pt is accompanied by her daughter    Prior to Admission medications    Medication Sig Start Date End Date Taking? Authorizing Provider   apixaban (ELIQUIS) 2.5 MG TABS tablet Take 1 tablet by mouth 2 times daily 8/28/18  Yes Iqra Bennett MD   acetaminophen (TYLENOL) 500 MG tablet Take 500 mg by mouth every 6 hours as needed for Pain or Fever   Yes Historical Provider, MD   furosemide (LASIX) 20 MG tablet TAKE 1 TABLET DAILY IF THERE IS A 1 LB WEIGHT GAIN THEN TAKE 2 TABLETS ON THOSE DAYS  Patient taking differently: Take 20 mg by mouth daily TAKE 1 TABLET DAILY IF THERE IS A 1 LB WEIGHT GAIN THEN TAKE 2 TABLETS ON THOSE DAYS 8/13/18  Yes Iqra Bennett MD   amLODIPine (NORVASC) 5 MG tablet Take 1 tablet by mouth daily 5/3/18  Yes Yimi Merino MD   Tadalafil, PAH, (ADCIRCA) 20 MG tablet Take 2 tablets by mouth daily Can not take nitrates with this drug 3/12/18  Yes Yimi Merino MD   losartan (COZAAR) 100 MG tablet TAKE 1 TABLET BY MOUTH DAILY. Patient taking differently: Take 100 mg by mouth daily TAKE 1 TABLET BY MOUTH DAILY.  3/1/18  Yes Yimi Merino MD   omeprazole (PRILOSEC) 40 MG delayed of 10/13/15 the pt estimated right sided pressure has further declined from 57mmHg to 45mmHg.  Hiatal hernia     History of echocardiogram 9/27/14    Compared to the previous study of 4/18/13, the patients estimated RVSP has decreased from >75 mmHg to 35mmHg    History of stress test 10/26/15    Probably normal. EF >70%. Significant electrocardiographic  evidence of MI during EKG monitoring without significant associated arrhythmias. Max ST depression was 1.4 mm in lead ll. Low risk for significant CAD.  Hyperglycemia 2008    Hyperlipidemia 1992    Hypertension 1991    Hyperthyroidism     Kidney stone     Menopause age 36    Osteoarthritis     lumbar    PAH (pulmonary artery hypertension) ([facility] [address] 7/5/2012    RHC: Mean PA 26, PCWP 12. Echo 6/12: RVSP 70    Pulmonary hypertension (HCC) 5/15/12    RVSP 70 mmHg on echocardiogram. the right ventricle is moderately to severely enlarged with preserved function. Severe tricuspid regurgitation.     Status post right heart catheterization 7/5/2012    RHC: Mean PA 26, PCWP 12.    Tricuspid regurgitation     MR 4/2002 ATB proph    Unspecified hypothyroidism 2/12/2015      Social History   Substance Use Topics    Smoking status: Never Smoker    Smokeless tobacco: Never Used    Alcohol use No      Current Outpatient Prescriptions   Medication Sig Dispense Refill    apixaban (ELIQUIS) 2.5 MG TABS tablet Take 1 tablet by mouth 2 times daily 60 tablet 1    acetaminophen (TYLENOL) 500 MG tablet Take 500 mg by mouth every 6 hours as needed for Pain or Fever      furosemide (LASIX) 20 MG tablet TAKE 1 TABLET DAILY IF THERE IS A 1 LB WEIGHT GAIN THEN TAKE 2 TABLETS ON THOSE DAYS (Patient taking differently: Take 20 mg by mouth daily TAKE 1 TABLET DAILY IF THERE IS A 1 LB WEIGHT GAIN THEN TAKE 2 TABLETS ON THOSE DAYS) 90 tablet 3    amLODIPine (NORVASC) 5 MG tablet Take 1 tablet by mouth daily 90 tablet 3    Tadalafil, PAH, (ADCIRCA) 20 MG tablet Take 2 tablets

## 2018-10-10 ENCOUNTER — TELEPHONE (OUTPATIENT)
Dept: FAMILY MEDICINE CLINIC | Age: 83
End: 2018-10-10

## 2018-10-10 ENCOUNTER — HOSPITAL ENCOUNTER (OUTPATIENT)
Age: 83
Discharge: HOME | End: 2018-10-10
Payer: MEDICARE

## 2018-10-10 DIAGNOSIS — E78.49 OTHER HYPERLIPIDEMIA: Primary | ICD-10-CM

## 2018-10-10 DIAGNOSIS — I50.30 DIASTOLIC CONGESTIVE HEART FAILURE, UNSPECIFIED HF CHRONICITY (HCC): ICD-10-CM

## 2018-10-10 DIAGNOSIS — E87.6 LOW SERUM POTASSIUM LEVEL: ICD-10-CM

## 2018-10-10 DIAGNOSIS — E78.49 OTHER HYPERLIPIDEMIA: ICD-10-CM

## 2018-10-10 LAB
ANION GAP SERPL CALCULATED.3IONS-SCNC: 10 MMOL/L (ref 9–17)
BNP INTERPRETATION: ABNORMAL
BUN BLDV-MCNC: 19 MG/DL (ref 8–23)
BUN/CREAT BLD: 21 (ref 9–20)
CALCIUM SERPL-MCNC: 8.9 MG/DL (ref 8.6–10.4)
CHLORIDE BLD-SCNC: 108 MMOL/L (ref 98–107)
CO2: 27 MMOL/L (ref 20–31)
CREAT SERPL-MCNC: 0.91 MG/DL (ref 0.5–0.9)
GFR AFRICAN AMERICAN: >60 ML/MIN
GFR NON-AFRICAN AMERICAN: 58 ML/MIN
GFR SERPL CREATININE-BSD FRML MDRD: ABNORMAL ML/MIN/{1.73_M2}
GFR SERPL CREATININE-BSD FRML MDRD: ABNORMAL ML/MIN/{1.73_M2}
GLUCOSE BLD-MCNC: 106 MG/DL (ref 70–99)
HCT VFR BLD CALC: 37.3 % (ref 36.3–47.1)
HEMOGLOBIN: 11.6 G/DL (ref 11.9–15.1)
MCH RBC QN AUTO: 32.4 PG (ref 25.2–33.5)
MCHC RBC AUTO-ENTMCNC: 31.1 G/DL (ref 28.4–34.8)
MCV RBC AUTO: 104.2 FL (ref 82.6–102.9)
NRBC AUTOMATED: 0 PER 100 WBC
PDW BLD-RTO: 14.1 % (ref 11.8–14.4)
PLATELET # BLD: 219 K/UL (ref 138–453)
PMV BLD AUTO: 11.2 FL (ref 8.1–13.5)
POTASSIUM SERPL-SCNC: 3.9 MMOL/L (ref 3.7–5.3)
PRO-BNP: 3309 PG/ML
RBC # BLD: 3.58 M/UL (ref 3.95–5.11)
SODIUM BLD-SCNC: 145 MMOL/L (ref 135–144)
WBC # BLD: 6.4 K/UL (ref 3.5–11.3)

## 2018-10-10 PROCEDURE — 36415 COLL VENOUS BLD VENIPUNCTURE: CPT

## 2018-10-10 PROCEDURE — 80048 BASIC METABOLIC PNL TOTAL CA: CPT

## 2018-10-10 PROCEDURE — 85027 COMPLETE CBC AUTOMATED: CPT

## 2018-10-10 PROCEDURE — 83880 ASSAY OF NATRIURETIC PEPTIDE: CPT

## 2018-10-30 RX ORDER — LEVOTHYROXINE SODIUM 0.1 MG/1
TABLET ORAL
Qty: 90 TABLET | Refills: 3 | Status: SHIPPED | OUTPATIENT
Start: 2018-10-30 | End: 2019-04-08 | Stop reason: SDUPTHER

## 2018-10-30 RX ORDER — CARVEDILOL 25 MG/1
TABLET ORAL
Qty: 180 TABLET | Refills: 3 | Status: ON HOLD | OUTPATIENT
Start: 2018-10-30 | End: 2019-03-10 | Stop reason: HOSPADM

## 2018-11-27 ENCOUNTER — OFFICE VISIT (OUTPATIENT)
Dept: CARDIOLOGY | Age: 83
End: 2018-11-27
Payer: MEDICARE

## 2018-11-27 ENCOUNTER — HOSPITAL ENCOUNTER (OUTPATIENT)
Dept: GENERAL RADIOLOGY | Age: 83
Discharge: HOME | End: 2018-11-29
Payer: MEDICARE

## 2018-11-27 ENCOUNTER — HOSPITAL ENCOUNTER (OUTPATIENT)
Age: 83
Discharge: HOME | End: 2018-11-29
Payer: MEDICARE

## 2018-11-27 VITALS
DIASTOLIC BLOOD PRESSURE: 85 MMHG | BODY MASS INDEX: 22.22 KG/M2 | SYSTOLIC BLOOD PRESSURE: 135 MMHG | WEIGHT: 125.4 LBS | HEART RATE: 85 BPM | RESPIRATION RATE: 20 BRPM | HEIGHT: 63 IN

## 2018-11-27 DIAGNOSIS — I50.33 ACUTE ON CHRONIC DIASTOLIC CHF (CONGESTIVE HEART FAILURE), NYHA CLASS 3 (HCC): Primary | ICD-10-CM

## 2018-11-27 DIAGNOSIS — I50.30 DIASTOLIC CONGESTIVE HEART FAILURE, UNSPECIFIED HF CHRONICITY (HCC): ICD-10-CM

## 2018-11-27 DIAGNOSIS — I27.20 PULMONARY HYPERTENSION (HCC): ICD-10-CM

## 2018-11-27 DIAGNOSIS — I25.10 ASHD (ARTERIOSCLEROTIC HEART DISEASE): ICD-10-CM

## 2018-11-27 DIAGNOSIS — J90 PLEURAL EFFUSION, RIGHT: ICD-10-CM

## 2018-11-27 DIAGNOSIS — E78.49 OTHER HYPERLIPIDEMIA: ICD-10-CM

## 2018-11-27 PROCEDURE — G8482 FLU IMMUNIZE ORDER/ADMIN: HCPCS | Performed by: FAMILY MEDICINE

## 2018-11-27 PROCEDURE — 71046 X-RAY EXAM CHEST 2 VIEWS: CPT

## 2018-11-27 PROCEDURE — 1123F ACP DISCUSS/DSCN MKR DOCD: CPT | Performed by: FAMILY MEDICINE

## 2018-11-27 PROCEDURE — 99214 OFFICE O/P EST MOD 30 MIN: CPT | Performed by: FAMILY MEDICINE

## 2018-11-27 PROCEDURE — G8598 ASA/ANTIPLAT THER USED: HCPCS | Performed by: FAMILY MEDICINE

## 2018-11-27 PROCEDURE — 1090F PRES/ABSN URINE INCON ASSESS: CPT | Performed by: FAMILY MEDICINE

## 2018-11-27 PROCEDURE — G8427 DOCREV CUR MEDS BY ELIG CLIN: HCPCS | Performed by: FAMILY MEDICINE

## 2018-11-27 PROCEDURE — 1101F PT FALLS ASSESS-DOCD LE1/YR: CPT | Performed by: FAMILY MEDICINE

## 2018-11-27 PROCEDURE — 1036F TOBACCO NON-USER: CPT | Performed by: FAMILY MEDICINE

## 2018-11-27 PROCEDURE — G8420 CALC BMI NORM PARAMETERS: HCPCS | Performed by: FAMILY MEDICINE

## 2018-11-27 PROCEDURE — 4040F PNEUMOC VAC/ADMIN/RCVD: CPT | Performed by: FAMILY MEDICINE

## 2018-11-27 RX ORDER — TADALAFIL 20 MG/1
TABLET ORAL
COMMUNITY
End: 2018-11-27

## 2018-11-27 RX ORDER — LOSARTAN POTASSIUM 100 MG/1
TABLET ORAL
COMMUNITY
End: 2018-11-27

## 2018-11-27 RX ORDER — LEVOTHYROXINE SODIUM 0.1 MG/1
TABLET ORAL
COMMUNITY
End: 2018-11-27

## 2018-11-27 RX ORDER — AMLODIPINE BESYLATE 5 MG/1
TABLET ORAL
COMMUNITY
End: 2018-11-27

## 2018-11-27 RX ORDER — CARVEDILOL 25 MG/1
TABLET ORAL
COMMUNITY
End: 2018-11-27

## 2018-11-27 RX ORDER — FUROSEMIDE 20 MG/1
TABLET ORAL
COMMUNITY
End: 2018-11-27

## 2018-11-27 RX ORDER — ATORVASTATIN CALCIUM 40 MG/1
TABLET, FILM COATED ORAL
COMMUNITY
End: 2018-11-27

## 2018-11-27 RX ORDER — ROPINIROLE 0.5 MG/1
TABLET, FILM COATED ORAL
COMMUNITY
End: 2018-11-27

## 2018-11-27 RX ORDER — OMEPRAZOLE 40 MG/1
CAPSULE, DELAYED RELEASE ORAL
COMMUNITY
End: 2018-11-27

## 2018-11-27 NOTE — PROGRESS NOTES
6/19/12    sinus bradycardia 55 beats per minute. Incomplete right bundle branch block. Evidence of left atrial enlargement. Abnormal ECG a    Allergic rhinitis 4/17/2017    Anxiety     ASHD (arteriosclerotic heart disease) 8/1997    CA 50-60%    ASHD (arteriosclerotic heart disease) 08/97    CA 02-83%    Diastolic congestive heart failure (HCC)     Elevated liver enzymes 1996    GERD (gastroesophageal reflux disease)     H/O echocardiogram 10/13/15    EF:60%. Mild mitral regurgitation. Moderate to severe tricuspid reguritation. Moderately dilated right atrium and right ventricle systolic pressure of 57 mmHg. Evidence of mild (Grade 1) diastolic dysfunction.  H/O echocardiogram 05/26/2017    Estimated EF 55%. The right atrium appears severly dialted. Right ventricular dilatation. Moderate pulmonary HTN with an estimated right ventricular systolic pressure of 74HGVH. Moderate to severe tricuspid regurg. Evidence of mild diastolic dysfunction is seen. Compared to the previous study of 10/13/15 the pt estimated right sided pressure has further declined from 57mmHg to 45mmHg.  H/O echocardiogram 05/2018    EF 60% Mild Mitral regurg Severly dilated right atrium and right ventricle with mildly reduced right ventricular systolic function. Severe tricuspid regurgitation. Moderate pulmonary hypertension with an estimated right ventricular systolic pressure of 66FQVM Evidence of moderate diastolic dysfunction is seen    Hiatal hernia     History of echocardiogram 9/27/14    Compared to the previous study of 4/18/13, the patients estimated RVSP has decreased from >75 mmHg to 35mmHg    History of stress test 10/26/15    Probably normal. EF >70%. Significant electrocardiographic  evidence of MI during EKG monitoring without significant associated arrhythmias. Max ST depression was 1.4 mm in lead ll. Low risk for significant CAD.     Hyperglycemia 2008    Hyperlipidemia 1992    Hypertension 1991    Hyperthyroidism     Kidney stone     Menopause age 36    Osteoarthritis     lumbar    PAH (pulmonary artery hypertension) ([facility] [address] 7/5/2012    RHC: Mean PA 26, PCWP 12. Echo 6/12: RVSP 70    Pulmonary hypertension (HCC) 5/15/12    RVSP 70 mmHg on echocardiogram. the right ventricle is moderately to severely enlarged with preserved function. Severe tricuspid regurgitation.  Status post right heart catheterization 7/5/2012    RHC: Mean PA 26, PCWP 12.    Tricuspid regurgitation     MR 4/2002 ATB proph    Unspecified hypothyroidism 2/12/2015       CURRENT ALLERGIES: Patient has no known allergies. REVIEW OF SYSTEMS: 14 systems were reviewed. Pertinent positives and negatives as above, all else negative.      Past Surgical History:   Procedure Laterality Date    CARDIAC CATHETERIZATION Left 8/1997    Dr. Radha Pennington  10/2015    Dr. Hardik Slaughter Bilateral 10/2014    Cataracts Surgery    Social History:  Social History   Substance Use Topics    Smoking status: Never Smoker    Smokeless tobacco: Never Used    Alcohol use No        CURRENT MEDICATIONS:  Outpatient Prescriptions Marked as Taking for the 11/27/18 encounter (Office Visit) with Jim Andino MD   Medication Sig Dispense Refill    carvedilol (COREG) 25 MG tablet TAKE ONE TABLET BY MOUTH TWICE A DAY WITH MEALS 180 tablet 3    levothyroxine (SYNTHROID) 100 MCG tablet TAKE ONE TABLET BY MOUTH DAILY 90 tablet 3    ELIQUIS 2.5 MG TABS tablet TAKE ONE TABLET BY MOUTH TWICE A  tablet 3    spironolactone (ALDACTONE) 25 MG tablet Take 1 tablet by mouth daily 90 tablet 3    furosemide (LASIX) 20 MG tablet TAKE 1 TABLET DAILY IF THERE IS A 1 LB WEIGHT GAIN THEN TAKE 2 TABLETS ON THOSE DAYS (Patient taking differently: Take 20 mg by mouth daily TAKE 1 TABLET DAILY IF THERE IS A 1 LB WEIGHT GAIN THEN TAKE 2 TABLETS ON THOSE DAYS) 90 tablet 3    amLODIPine (NORVASC) 5 MG tablet Take 1 tablet by mouth daily 90 tablet 3   

## 2018-11-29 ENCOUNTER — TELEPHONE (OUTPATIENT)
Dept: CARDIOLOGY | Age: 83
End: 2018-11-29

## 2018-12-05 RX ORDER — TADALAFIL 20 MG/1
40 TABLET ORAL DAILY
Qty: 180 TABLET | Refills: 3 | Status: SHIPPED | OUTPATIENT
Start: 2018-12-05 | End: 2019-03-08 | Stop reason: ALTCHOICE

## 2018-12-12 RX ORDER — OMEPRAZOLE 40 MG/1
CAPSULE, DELAYED RELEASE ORAL
Qty: 90 CAPSULE | Refills: 3 | Status: ON HOLD | OUTPATIENT
Start: 2018-12-12 | End: 2019-11-06 | Stop reason: HOSPADM

## 2019-01-07 ENCOUNTER — OFFICE VISIT (OUTPATIENT)
Dept: FAMILY MEDICINE CLINIC | Age: 84
End: 2019-01-07
Payer: MEDICARE

## 2019-01-07 ENCOUNTER — HOSPITAL ENCOUNTER (OUTPATIENT)
Age: 84
Discharge: HOME | End: 2019-01-07
Payer: MEDICARE

## 2019-01-07 VITALS
DIASTOLIC BLOOD PRESSURE: 62 MMHG | BODY MASS INDEX: 21.62 KG/M2 | SYSTOLIC BLOOD PRESSURE: 112 MMHG | HEIGHT: 63 IN | WEIGHT: 122 LBS

## 2019-01-07 DIAGNOSIS — I27.20 PULMONARY HYPERTENSION (HCC): ICD-10-CM

## 2019-01-07 DIAGNOSIS — I50.30 DIASTOLIC CONGESTIVE HEART FAILURE, UNSPECIFIED HF CHRONICITY (HCC): ICD-10-CM

## 2019-01-07 DIAGNOSIS — M51.37 DEGENERATION OF LUMBAR OR LUMBOSACRAL INTERVERTEBRAL DISC: ICD-10-CM

## 2019-01-07 DIAGNOSIS — I50.30 DIASTOLIC CONGESTIVE HEART FAILURE, UNSPECIFIED HF CHRONICITY (HCC): Primary | ICD-10-CM

## 2019-01-07 DIAGNOSIS — E78.49 OTHER HYPERLIPIDEMIA: ICD-10-CM

## 2019-01-07 LAB
ANION GAP SERPL CALCULATED.3IONS-SCNC: 10 MMOL/L (ref 9–17)
BNP INTERPRETATION: ABNORMAL
BUN BLDV-MCNC: 20 MG/DL (ref 8–23)
BUN/CREAT BLD: 19 (ref 9–20)
CALCIUM SERPL-MCNC: 9.9 MG/DL (ref 8.6–10.4)
CHLORIDE BLD-SCNC: 104 MMOL/L (ref 98–107)
CO2: 27 MMOL/L (ref 20–31)
CREAT SERPL-MCNC: 1.07 MG/DL (ref 0.5–0.9)
GFR AFRICAN AMERICAN: 58 ML/MIN
GFR NON-AFRICAN AMERICAN: 48 ML/MIN
GFR SERPL CREATININE-BSD FRML MDRD: ABNORMAL ML/MIN/{1.73_M2}
GFR SERPL CREATININE-BSD FRML MDRD: ABNORMAL ML/MIN/{1.73_M2}
GLUCOSE BLD-MCNC: 105 MG/DL (ref 70–99)
HCT VFR BLD CALC: 37 % (ref 36.3–47.1)
HEMOGLOBIN: 11.7 G/DL (ref 11.9–15.1)
MCH RBC QN AUTO: 32.1 PG (ref 25.2–33.5)
MCHC RBC AUTO-ENTMCNC: 31.6 G/DL (ref 28.4–34.8)
MCV RBC AUTO: 101.6 FL (ref 82.6–102.9)
NRBC AUTOMATED: 0 PER 100 WBC
PDW BLD-RTO: 14.6 % (ref 11.8–14.4)
PLATELET # BLD: 205 K/UL (ref 138–453)
PMV BLD AUTO: 11.2 FL (ref 8.1–13.5)
POTASSIUM SERPL-SCNC: 4.1 MMOL/L (ref 3.7–5.3)
PRO-BNP: 2306 PG/ML
RBC # BLD: 3.64 M/UL (ref 3.95–5.11)
SODIUM BLD-SCNC: 141 MMOL/L (ref 135–144)
WBC # BLD: 6.2 K/UL (ref 3.5–11.3)

## 2019-01-07 PROCEDURE — 85027 COMPLETE CBC AUTOMATED: CPT

## 2019-01-07 PROCEDURE — 36415 COLL VENOUS BLD VENIPUNCTURE: CPT

## 2019-01-07 PROCEDURE — 4040F PNEUMOC VAC/ADMIN/RCVD: CPT | Performed by: FAMILY MEDICINE

## 2019-01-07 PROCEDURE — 1036F TOBACCO NON-USER: CPT | Performed by: FAMILY MEDICINE

## 2019-01-07 PROCEDURE — 83880 ASSAY OF NATRIURETIC PEPTIDE: CPT

## 2019-01-07 PROCEDURE — G8420 CALC BMI NORM PARAMETERS: HCPCS | Performed by: FAMILY MEDICINE

## 2019-01-07 PROCEDURE — 80048 BASIC METABOLIC PNL TOTAL CA: CPT

## 2019-01-07 PROCEDURE — G8482 FLU IMMUNIZE ORDER/ADMIN: HCPCS | Performed by: FAMILY MEDICINE

## 2019-01-07 PROCEDURE — 1090F PRES/ABSN URINE INCON ASSESS: CPT | Performed by: FAMILY MEDICINE

## 2019-01-07 PROCEDURE — 1123F ACP DISCUSS/DSCN MKR DOCD: CPT | Performed by: FAMILY MEDICINE

## 2019-01-07 PROCEDURE — G8598 ASA/ANTIPLAT THER USED: HCPCS | Performed by: FAMILY MEDICINE

## 2019-01-07 PROCEDURE — 1101F PT FALLS ASSESS-DOCD LE1/YR: CPT | Performed by: FAMILY MEDICINE

## 2019-01-07 PROCEDURE — 99214 OFFICE O/P EST MOD 30 MIN: CPT | Performed by: FAMILY MEDICINE

## 2019-01-07 PROCEDURE — G8427 DOCREV CUR MEDS BY ELIG CLIN: HCPCS | Performed by: FAMILY MEDICINE

## 2019-01-09 PROBLEM — I27.0 PRIMARY PULMONARY HYPERTENSION (HCC): Status: RESOLVED | Noted: 2017-01-30 | Resolved: 2019-01-09

## 2019-01-11 ENCOUNTER — TELEPHONE (OUTPATIENT)
Dept: CARDIOLOGY | Age: 84
End: 2019-01-11

## 2019-01-11 RX ORDER — LOSARTAN POTASSIUM 100 MG/1
TABLET ORAL
Qty: 90 TABLET | Refills: 3 | Status: ON HOLD | OUTPATIENT
Start: 2019-01-11 | End: 2019-03-10 | Stop reason: HOSPADM

## 2019-02-01 ENCOUNTER — TELEPHONE (OUTPATIENT)
Dept: CARDIOLOGY | Age: 84
End: 2019-02-01

## 2019-02-25 ENCOUNTER — TELEPHONE (OUTPATIENT)
Dept: FAMILY MEDICINE CLINIC | Age: 84
End: 2019-02-25

## 2019-02-25 DIAGNOSIS — M51.37 DEGENERATION OF LUMBAR OR LUMBOSACRAL INTERVERTEBRAL DISC: ICD-10-CM

## 2019-02-25 DIAGNOSIS — M19.90 OSTEOARTHRITIS, UNSPECIFIED OSTEOARTHRITIS TYPE, UNSPECIFIED SITE: Primary | ICD-10-CM

## 2019-03-08 ENCOUNTER — APPOINTMENT (OUTPATIENT)
Dept: NON INVASIVE DIAGNOSTICS | Age: 84
DRG: 291 | End: 2019-03-08
Attending: FAMILY MEDICINE
Payer: MEDICARE

## 2019-03-08 ENCOUNTER — HOSPITAL ENCOUNTER (INPATIENT)
Age: 84
LOS: 2 days | Discharge: HOME HEALTH | DRG: 291 | End: 2019-03-10
Attending: FAMILY MEDICINE | Admitting: FAMILY MEDICINE
Payer: MEDICARE

## 2019-03-08 ENCOUNTER — OFFICE VISIT (OUTPATIENT)
Dept: FAMILY MEDICINE CLINIC | Age: 84
End: 2019-03-08

## 2019-03-08 ENCOUNTER — APPOINTMENT (OUTPATIENT)
Dept: GENERAL RADIOLOGY | Age: 84
DRG: 291 | End: 2019-03-08
Attending: FAMILY MEDICINE
Payer: MEDICARE

## 2019-03-08 VITALS — DIASTOLIC BLOOD PRESSURE: 54 MMHG | HEIGHT: 63 IN | BODY MASS INDEX: 21.61 KG/M2 | SYSTOLIC BLOOD PRESSURE: 96 MMHG

## 2019-03-08 DIAGNOSIS — I50.9 CONGESTIVE HEART FAILURE, UNSPECIFIED HF CHRONICITY, UNSPECIFIED HEART FAILURE TYPE (HCC): ICD-10-CM

## 2019-03-08 DIAGNOSIS — I27.20 PULMONARY HYPERTENSION (HCC): Primary | ICD-10-CM

## 2019-03-08 PROBLEM — I48.20 CHRONIC ATRIAL FIBRILLATION (HCC): Status: ACTIVE | Noted: 2018-08-26

## 2019-03-08 PROBLEM — N18.9 ACUTE ON CHRONIC RENAL INSUFFICIENCY: Status: ACTIVE | Noted: 2019-03-08

## 2019-03-08 PROBLEM — I50.23 CHF (CONGESTIVE HEART FAILURE), NYHA CLASS I, ACUTE ON CHRONIC, SYSTOLIC (HCC): Status: ACTIVE | Noted: 2019-03-08

## 2019-03-08 PROBLEM — N28.9 ACUTE ON CHRONIC RENAL INSUFFICIENCY: Status: ACTIVE | Noted: 2019-03-08

## 2019-03-08 LAB
ALBUMIN SERPL-MCNC: 3.8 G/DL (ref 3.5–5.2)
ALBUMIN/GLOBULIN RATIO: 1.3 (ref 1–2.5)
ALP BLD-CCNC: 98 U/L (ref 35–104)
ALT SERPL-CCNC: 7 U/L (ref 5–33)
ANION GAP SERPL CALCULATED.3IONS-SCNC: 15 MMOL/L (ref 9–17)
AST SERPL-CCNC: 13 U/L
BILIRUB SERPL-MCNC: 0.62 MG/DL (ref 0.3–1.2)
BNP INTERPRETATION: ABNORMAL
BUN BLDV-MCNC: 48 MG/DL (ref 8–23)
BUN/CREAT BLD: 29 (ref 9–20)
CALCIUM SERPL-MCNC: 9.4 MG/DL (ref 8.6–10.4)
CHLORIDE BLD-SCNC: 108 MMOL/L (ref 98–107)
CO2: 20 MMOL/L (ref 20–31)
CREAT SERPL-MCNC: 1.66 MG/DL (ref 0.5–0.9)
GFR AFRICAN AMERICAN: 35 ML/MIN
GFR NON-AFRICAN AMERICAN: 29 ML/MIN
GFR SERPL CREATININE-BSD FRML MDRD: ABNORMAL ML/MIN/{1.73_M2}
GFR SERPL CREATININE-BSD FRML MDRD: ABNORMAL ML/MIN/{1.73_M2}
GLUCOSE BLD-MCNC: 105 MG/DL (ref 70–99)
HCT VFR BLD CALC: 34 % (ref 36.3–47.1)
HEMOGLOBIN: 10.8 G/DL (ref 11.9–15.1)
LV EF: 60 %
LVEF MODALITY: NORMAL
MCH RBC QN AUTO: 32 PG (ref 25.2–33.5)
MCHC RBC AUTO-ENTMCNC: 31.8 G/DL (ref 28.4–34.8)
MCV RBC AUTO: 100.6 FL (ref 82.6–102.9)
NRBC AUTOMATED: 0 PER 100 WBC
PDW BLD-RTO: 15.5 % (ref 11.8–14.4)
PLATELET # BLD: 182 K/UL (ref 138–453)
PMV BLD AUTO: 12.1 FL (ref 8.1–13.5)
POTASSIUM SERPL-SCNC: 4.7 MMOL/L (ref 3.7–5.3)
PRO-BNP: 3446 PG/ML
RBC # BLD: 3.38 M/UL (ref 3.95–5.11)
SODIUM BLD-SCNC: 143 MMOL/L (ref 135–144)
TOTAL PROTEIN: 6.8 G/DL (ref 6.4–8.3)
TSH SERPL DL<=0.05 MIU/L-ACNC: 0.31 MIU/L (ref 0.3–5)
WBC # BLD: 6.4 K/UL (ref 3.5–11.3)

## 2019-03-08 PROCEDURE — 36415 COLL VENOUS BLD VENIPUNCTURE: CPT

## 2019-03-08 PROCEDURE — 80053 COMPREHEN METABOLIC PANEL: CPT

## 2019-03-08 PROCEDURE — 2580000003 HC RX 258: Performed by: FAMILY MEDICINE

## 2019-03-08 PROCEDURE — 99220 PR INITIAL OBSERVATION CARE/DAY 70 MINUTES: CPT | Performed by: INTERNAL MEDICINE

## 2019-03-08 PROCEDURE — 6360000002 HC RX W HCPCS: Performed by: FAMILY MEDICINE

## 2019-03-08 PROCEDURE — 93306 TTE W/DOPPLER COMPLETE: CPT

## 2019-03-08 PROCEDURE — 93005 ELECTROCARDIOGRAM TRACING: CPT

## 2019-03-08 PROCEDURE — 99223 1ST HOSP IP/OBS HIGH 75: CPT | Performed by: FAMILY MEDICINE

## 2019-03-08 PROCEDURE — 6370000000 HC RX 637 (ALT 250 FOR IP): Performed by: FAMILY MEDICINE

## 2019-03-08 PROCEDURE — 71046 X-RAY EXAM CHEST 2 VIEWS: CPT

## 2019-03-08 PROCEDURE — 84443 ASSAY THYROID STIM HORMONE: CPT

## 2019-03-08 PROCEDURE — 85027 COMPLETE CBC AUTOMATED: CPT

## 2019-03-08 PROCEDURE — 83880 ASSAY OF NATRIURETIC PEPTIDE: CPT

## 2019-03-08 PROCEDURE — 1200000000 HC SEMI PRIVATE

## 2019-03-08 RX ORDER — CARVEDILOL 25 MG/1
25 TABLET ORAL 2 TIMES DAILY WITH MEALS
Status: DISCONTINUED | OUTPATIENT
Start: 2019-03-08 | End: 2019-03-08

## 2019-03-08 RX ORDER — SILDENAFIL CITRATE 20 MG/1
TABLET ORAL
Status: ON HOLD | COMMUNITY
End: 2019-11-04

## 2019-03-08 RX ORDER — LEVOTHYROXINE SODIUM 0.1 MG/1
100 TABLET ORAL DAILY
Status: DISCONTINUED | OUTPATIENT
Start: 2019-03-08 | End: 2019-03-10 | Stop reason: HOSPADM

## 2019-03-08 RX ORDER — CARVEDILOL 6.25 MG/1
6.25 TABLET ORAL 2 TIMES DAILY WITH MEALS
Status: DISCONTINUED | OUTPATIENT
Start: 2019-03-09 | End: 2019-03-10 | Stop reason: HOSPADM

## 2019-03-08 RX ORDER — FUROSEMIDE 10 MG/ML
40 INJECTION INTRAMUSCULAR; INTRAVENOUS 2 TIMES DAILY
Status: DISCONTINUED | OUTPATIENT
Start: 2019-03-08 | End: 2019-03-09

## 2019-03-08 RX ORDER — LOSARTAN POTASSIUM 50 MG/1
100 TABLET ORAL DAILY
Status: DISCONTINUED | OUTPATIENT
Start: 2019-03-08 | End: 2019-03-08

## 2019-03-08 RX ORDER — ONDANSETRON 2 MG/ML
4 INJECTION INTRAMUSCULAR; INTRAVENOUS EVERY 6 HOURS PRN
Status: DISCONTINUED | OUTPATIENT
Start: 2019-03-08 | End: 2019-03-10 | Stop reason: HOSPADM

## 2019-03-08 RX ORDER — AMLODIPINE BESYLATE 5 MG/1
5 TABLET ORAL DAILY
Status: DISCONTINUED | OUTPATIENT
Start: 2019-03-08 | End: 2019-03-09

## 2019-03-08 RX ORDER — ROPINIROLE 0.25 MG/1
0.5 TABLET, FILM COATED ORAL NIGHTLY
Status: DISCONTINUED | OUTPATIENT
Start: 2019-03-08 | End: 2019-03-10 | Stop reason: HOSPADM

## 2019-03-08 RX ORDER — ATORVASTATIN CALCIUM 40 MG/1
40 TABLET, FILM COATED ORAL DAILY
Status: DISCONTINUED | OUTPATIENT
Start: 2019-03-08 | End: 2019-03-10 | Stop reason: HOSPADM

## 2019-03-08 RX ORDER — SODIUM CHLORIDE 0.9 % (FLUSH) 0.9 %
10 SYRINGE (ML) INJECTION EVERY 12 HOURS SCHEDULED
Status: DISCONTINUED | OUTPATIENT
Start: 2019-03-08 | End: 2019-03-10 | Stop reason: HOSPADM

## 2019-03-08 RX ORDER — SPIRONOLACTONE 25 MG/1
25 TABLET ORAL DAILY
Status: DISCONTINUED | OUTPATIENT
Start: 2019-03-08 | End: 2019-03-10 | Stop reason: HOSPADM

## 2019-03-08 RX ORDER — ACETAMINOPHEN 500 MG
500 TABLET ORAL EVERY 6 HOURS PRN
Status: DISCONTINUED | OUTPATIENT
Start: 2019-03-08 | End: 2019-03-10 | Stop reason: HOSPADM

## 2019-03-08 RX ORDER — SODIUM CHLORIDE 0.9 % (FLUSH) 0.9 %
10 SYRINGE (ML) INJECTION PRN
Status: DISCONTINUED | OUTPATIENT
Start: 2019-03-08 | End: 2019-03-10 | Stop reason: HOSPADM

## 2019-03-08 RX ADMIN — APIXABAN 2.5 MG: 2.5 TABLET, FILM COATED ORAL at 20:17

## 2019-03-08 RX ADMIN — Medication 10 ML: at 20:19

## 2019-03-08 RX ADMIN — ROPINIROLE HYDROCHLORIDE 0.5 MG: 0.25 TABLET, FILM COATED ORAL at 20:17

## 2019-03-08 RX ADMIN — FUROSEMIDE 40 MG: 10 INJECTION, SOLUTION INTRAMUSCULAR; INTRAVENOUS at 14:19

## 2019-03-08 NOTE — CONSULTS
Patient: Julieta Lott  : 10/13/1927  Date of Admission: 3/8/2019  Primary Care Physician: Araceli Barrett  Today's Date: 3/8/2019    REASON FOR CONSULTATION: Acute on chronic right-sided heart failure    HPI: Ms. Marquez Ulloa is a 80 y.o. female who was admitted to the hospital with acute on chronic heart failure leading to this consultation. Ms. Marquez Ulloa is a 80 y.o. female with a history of pulmonary artery hypertension with a normal LVEDP as well as moderate to severe right ventricular enlargement. As a result, she was started on Adcirca and her estimated RVSP on echocardiography has fallen from 70-75 mmHg to only 36 mmHg on her 14 echocardiogram with a significant improvement in her symptoms. Her repeat echo in  showed her estimated RVSP to be only 43 mmHg. This increased to 55 mmHg on a  echocardiogram with severe RV dilation.     She is admitted to the hospital with weakness and shortness of breath after going off Adcirca and placed on sildenafil 20 mg 3 times a day. She reported slowed down and activity and inability to do her activities of daily living. She denies any chest pain, pressure or tightness. No orthopnea or PND. No palpitations. No problems with medications. ECG today showed atrial fibrillation with slow ventricular response. Echo reviewed, dilated right ventricle with reduced systolic function. Biatrial enlargement. Moderate mitral and severe tricuspid regurgitation. Estimated right ventricular systolic pressure 55 mmHg but this can be an underestimation giving Drive ventricular systolic dysfunction. Past Medical History:   Diagnosis Date    Abnormal echocardiogram 5/15/12    EF >55%. mild diastolic dysfunction. RVSP 70 mmHg on echocardiogram. the right ventricle is moderately to severely enlarged with preserved function. Severe tricuspid regurgitation    Abnormal resting ECG findings 12    sinus bradycardia 55 beats per minute.  Incomplete right bundle branch block. Evidence of left atrial enlargement. Abnormal ECG a    Allergic rhinitis 4/17/2017    Anxiety     ASHD (arteriosclerotic heart disease) 8/1997    CA 50-60%    ASHD (arteriosclerotic heart disease) 08/97    CA 61-76%    Diastolic congestive heart failure (HCC)     Elevated liver enzymes 1996    GERD (gastroesophageal reflux disease)     H/O echocardiogram 10/13/15    EF:60%. Mild mitral regurgitation. Moderate to severe tricuspid reguritation. Moderately dilated right atrium and right ventricle systolic pressure of 57 mmHg. Evidence of mild (Grade 1) diastolic dysfunction.  H/O echocardiogram 05/26/2017    Estimated EF 55%. The right atrium appears severly dialted. Right ventricular dilatation. Moderate pulmonary HTN with an estimated right ventricular systolic pressure of 14KOIE. Moderate to severe tricuspid regurg. Evidence of mild diastolic dysfunction is seen. Compared to the previous study of 10/13/15 the pt estimated right sided pressure has further declined from 57mmHg to 45mmHg.  H/O echocardiogram 05/2018    EF 60% Mild Mitral regurg Severly dilated right atrium and right ventricle with mildly reduced right ventricular systolic function. Severe tricuspid regurgitation. Moderate pulmonary hypertension with an estimated right ventricular systolic pressure of [id number] Evidence of moderate diastolic dysfunction is seen    Hiatal hernia     History of echocardiogram 9/27/14    Compared to the previous study of 4/18/13, the patients estimated RVSP has decreased from >75 mmHg to 35mmHg    History of stress test 10/26/15    Probably normal. EF >70%. Significant electrocardiographic  evidence of MI during EKG monitoring without significant associated arrhythmias. Max ST depression was 1.4 mm in lead ll. Low risk for significant CAD.     Hyperglycemia 2008    Hyperlipidemia 1992    Hypertension 1991    Hyperthyroidism     Kidney stone     Menopause age 36    Osteoarthritis lumbar    PAH (pulmonary artery hypertension) ([facility] [address] 7/5/2012    RHC: Mean PA 26, PCWP 12. Echo 6/12: RVSP 70    Pulmonary hypertension ([address] 5/15/12    RVSP 70 mmHg on echocardiogram. the right ventricle is moderately to severely enlarged with preserved function. Severe tricuspid regurgitation.  Status post right heart catheterization 7/5/2012    RHC: Mean PA 26, PCWP 12.    Tricuspid regurgitation     MR 4/2002 ATB proph    Unspecified hypothyroidism 2/12/2015       CURRENT ALLERGIES: Patient has no known allergies. REVIEW OF SYSTEMS: 14 systems were reviewed. Pertinent positives and negatives as above, all else negative.      Past Surgical History:   Procedure Laterality Date    CARDIAC CATHETERIZATION Left 8/1997    Dr. Naomi Dance  10/2015    Dr. Marcello Meigs Bilateral 10/2014    Cataracts Surgery    Social History:  Social History     Tobacco Use    Smoking status: Never Smoker    Smokeless tobacco: Never Used   Substance Use Topics    Alcohol use: No    Drug use: Not on file        MEDICATIONS:    acetaminophen (TYLENOL) tablet 500 mg Q6H PRN   apixaban (ELIQUIS) tablet 2.5 mg BID   amLODIPine (NORVASC) tablet 5 mg Daily   atorvastatin (LIPITOR) tablet 40 mg Daily   carvedilol (COREG) tablet 25 mg BID WC   levothyroxine (SYNTHROID) tablet 100 mcg Daily   rOPINIRole (REQUIP) tablet 0.5 mg Nightly   spironolactone (ALDACTONE) tablet 25 mg Daily   sodium chloride flush 0.9 % injection 10 mL 2 times per day   sodium chloride flush 0.9 % injection 10 mL PRN   magnesium hydroxide (MILK OF MAGNESIA) 400 MG/5ML suspension 30 mL Daily PRN   ondansetron (ZOFRAN) injection 4 mg Q6H PRN   furosemide (LASIX) injection 40 mg BID      apixaban  2.5 mg Oral BID    amLODIPine  5 mg Oral Daily    atorvastatin  40 mg Oral Daily    carvedilol  25 mg Oral BID WC    levothyroxine  100 mcg Oral Daily    rOPINIRole  0.5 mg Oral Nightly    spironolactone  25 mg Oral Daily    sodium chloride flush  10 mL Intravenous 2 times per day    furosemide  40 mg Intravenous BID       CURRENT INPATIENT MEDICATIONS:  Prior to Admission medications    Medication Sig Start Date End Date Taking? Authorizing Provider   sildenafil (REVATIO) 20 MG tablet sildenafil (antihypertensive) 20 mg tablet   Take 1 tablet 3 times a day by oral route for 90 days. Yes Historical Provider, MD   apixaban (ELIQUIS) 2.5 MG TABS tablet Take 1 tablet by mouth 2 times daily 2/14/19  Yes Va Mckeon MD   losartan (COZAAR) 100 MG tablet TAKE 1 TABLET BY MOUTH DAILY. 1/11/19  Yes Va Mckeon MD   omeprazole (PRILOSEC) 40 MG delayed release capsule TAKE ONE CAPSULE BY MOUTH DAILY 12/12/18  Yes Mabel Barlow MD   carvedilol (COREG) 25 MG tablet TAKE ONE TABLET BY MOUTH TWICE A DAY WITH MEALS 10/30/18  Yes Mabel Barlow MD   levothyroxine (SYNTHROID) 100 MCG tablet TAKE ONE TABLET BY MOUTH DAILY 10/30/18  Yes Mabel Barlow MD   spironolactone (ALDACTONE) 25 MG tablet Take 1 tablet by mouth daily 10/2/18  Yes Mabel Barlow MD   furosemide (LASIX) 20 MG tablet TAKE 1 TABLET DAILY IF THERE IS A 1 LB WEIGHT GAIN THEN TAKE 2 TABLETS ON THOSE DAYS  Patient taking differently: Take 20 mg by mouth daily TAKE 1 TABLET DAILY IF THERE IS A 1 LB WEIGHT GAIN THEN TAKE 2 TABLETS ON THOSE DAYS 8/13/18  Yes Mabel Barlow MD   amLODIPine (NORVASC) 5 MG tablet Take 1 tablet by mouth daily 5/3/18  Yes Va Mckeon MD   rOPINIRole (REQUIP) 0.5 MG tablet TAKE 1 TABLET BY MOUTH NIGHTLY 8/8/16  Yes Mabel Barlow MD   ferrous sulfate 325 (65 FE) MG tablet Take 1 tablet by mouth 2 times daily (with meals)  Patient taking differently: Take 325 mg by mouth 2 times daily  11/4/15  Yes Mabel Barlow MD   Misc.  Devices (STEP N REST WALKER) MISC 1 Device by Does not apply route continuous 2/25/19   Mabel Barlow MD   acetaminophen (TYLENOL) 500 MG tablet Take 500 mg by mouth every 6 hours as needed for Pain or Fever    Historical Provider, MD   atorvastatin recorded by the scribe, accurately and completely reflects the services I personally performed and the decisions made by me. Naren Titus.  Emily Campos MD, MS, F.A.C.C. 8/27/2018

## 2019-03-08 NOTE — PROGRESS NOTES
Discussed discharge plans with the patient and the family. Patient is a  80 year female here with CHF. She is alert and oriented. Patient is a  and lives alone. She uses a walker and a scale. Patient does her cooking and the cleaning. She manages her own medications. Patient uses [facility] to get to her appointments. Her PCP is Dr. Alvenia Klinefelter. She has medical insurance that helps with medication costs. The discharge plan is home with home health for CHF education and treatment and PT. She request [facility] home care because she had them 6 months ago. Referral made to home health. Patient has Advance Directives but not on file. Explain to bring those in the next time she comes to the hospital. LSW to monitor and assist with any discharge needs as they arise.       CORNELIO Hahn

## 2019-03-08 NOTE — H&P
Hospital Medicine  History and Physical    Patient:  Marylene Loser  MRN: [id number]    Chief Complaint:  Short of breath and weak    History Obtained From:  patient, family member - daughter  PCP: Clydene Closs, MD    History of Present Illness: The patient is a 80 y.o. female who presents with subacute onset of weakness dizziness and dyspnea whtic came on recetnly after going off adcirca and placed on sidenafil 20 mg tid for her primary pulmponary hypertension  She had begun to feel poorly and dropped the dose a nd came in today and could not stand  She was weak and dyspneic  We elected to admit her for cardiac consultation and treatment of her CHF    Past Medical History:        Diagnosis Date    Abnormal echocardiogram 5/15/12    EF >55%. mild diastolic dysfunction. RVSP 70 mmHg on echocardiogram. the right ventricle is moderately to severely enlarged with preserved function. Severe tricuspid regurgitation    Abnormal resting ECG findings 6/19/12    sinus bradycardia 55 beats per minute. Incomplete right bundle branch block. Evidence of left atrial enlargement. Abnormal ECG a    Allergic rhinitis 4/17/2017    Anxiety     ASHD (arteriosclerotic heart disease) 8/1997    CA 50-60%    ASHD (arteriosclerotic heart disease) 08/97    CA 50-51%    Diastolic congestive heart failure (HCC)     Elevated liver enzymes 1996    GERD (gastroesophageal reflux disease)     H/O echocardiogram 10/13/15    EF:60%. Mild mitral regurgitation. Moderate to severe tricuspid reguritation. Moderately dilated right atrium and right ventricle systolic pressure of 57 mmHg. Evidence of mild (Grade 1) diastolic dysfunction.  H/O echocardiogram 05/26/2017    Estimated EF 55%. The right atrium appears severly dialted. Right ventricular dilatation. Moderate pulmonary HTN with an estimated right ventricular systolic pressure of [id number]. Moderate to severe tricuspid regurg. Evidence of mild diastolic dysfunction is seen.  Compared to the previous study of 10/13/15 the pt estimated right sided pressure has further declined from 57mmHg to 45mmHg.  H/O echocardiogram 05/2018    EF 60% Mild Mitral regurg Severly dilated right atrium and right ventricle with mildly reduced right ventricular systolic function. Severe tricuspid regurgitation. Moderate pulmonary hypertension with an estimated right ventricular systolic pressure of [id number] Evidence of moderate diastolic dysfunction is seen    Hiatal hernia     History of echocardiogram 9/27/14    Compared to the previous study of 4/18/13, the patients estimated RVSP has decreased from >75 mmHg to 35mmHg    History of stress test 10/26/15    Probably normal. EF >70%. Significant electrocardiographic  evidence of MI during EKG monitoring without significant associated arrhythmias. Max ST depression was 1.4 mm in lead ll. Low risk for significant CAD.  Hyperglycemia 2008    Hyperlipidemia 1992    Hypertension 1991    Hyperthyroidism     Kidney stone     Menopause age 36    Osteoarthritis     lumbar    PAH (pulmonary artery hypertension) ([facility] [address] 7/5/2012    RHC: Mean PA 26, PCWP 12. Echo 6/12: RVSP 70    Pulmonary hypertension (HCC) 5/15/12    RVSP 70 mmHg on echocardiogram. the right ventricle is moderately to severely enlarged with preserved function. Severe tricuspid regurgitation.  Status post right heart catheterization 7/5/2012    RHC: Mean PA 26, PCWP 12.    Tricuspid regurgitation     MR 4/2002 ATB proph    Unspecified hypothyroidism 2/12/2015       Past Surgical History:        Procedure Laterality Date    CARDIAC CATHETERIZATION Left 8/1997    Dr. Wilder Lea  10/2015    Dr. Katy Aburto Bilateral 10/2014    Cataracts Surgery       Medications Prior to Admission:    Prior to Admission medications    Medication Sig Start Date End Date Taking?  Authorizing Provider   sildenafil (REVATIO) 20 MG tablet sildenafil (antihypertensive) 20 mg tablet   Take 1 tablet 3 times a day by oral route for 90 days. Yes Historical Provider, MD   apixaban (ELIQUIS) 2.5 MG TABS tablet Take 1 tablet by mouth 2 times daily 2/14/19  Yes Joseph Cody MD   losartan (COZAAR) 100 MG tablet TAKE 1 TABLET BY MOUTH DAILY. 1/11/19  Yes Joseph Cody MD   omeprazole (PRILOSEC) 40 MG delayed release capsule TAKE ONE CAPSULE BY MOUTH DAILY 12/12/18  Yes Fred Heller MD   carvedilol (COREG) 25 MG tablet TAKE ONE TABLET BY MOUTH TWICE A DAY WITH MEALS 10/30/18  Yes Fred Heller MD   levothyroxine (SYNTHROID) 100 MCG tablet TAKE ONE TABLET BY MOUTH DAILY 10/30/18  Yes Fred Heller MD   spironolactone (ALDACTONE) 25 MG tablet Take 1 tablet by mouth daily 10/2/18  Yes Fred Heller MD   furosemide (LASIX) 20 MG tablet TAKE 1 TABLET DAILY IF THERE IS A 1 LB WEIGHT GAIN THEN TAKE 2 TABLETS ON THOSE DAYS  Patient taking differently: Take 20 mg by mouth daily TAKE 1 TABLET DAILY IF THERE IS A 1 LB WEIGHT GAIN THEN TAKE 2 TABLETS ON THOSE DAYS 8/13/18  Yes Fred Heller MD   amLODIPine (NORVASC) 5 MG tablet Take 1 tablet by mouth daily 5/3/18  Yes Joseph Cody MD   rOPINIRole (REQUIP) 0.5 MG tablet TAKE 1 TABLET BY MOUTH NIGHTLY 8/8/16  Yes Fred Heller MD   ferrous sulfate 325 (65 FE) MG tablet Take 1 tablet by mouth 2 times daily (with meals)  Patient taking differently: Take 325 mg by mouth 2 times daily  11/4/15  Yes Fred Heller MD   Misc. Devices (STEP N REST WALKER) MISC 1 Device by Does not apply route continuous 2/25/19   Fred Heller MD   acetaminophen (TYLENOL) 500 MG tablet Take 500 mg by mouth every 6 hours as needed for Pain or Fever    Historical Provider, MD   atorvastatin (LIPITOR) 40 MG tablet TAKE ONE TABLET BY MOUTH DAILY 11/17/17   Fred Heller MD   calcium carbonate (TUMS) 500 MG chewable tablet Take 1 tablet by mouth as needed     Historical Provider, MD       Allergies:  Patient has no known allergies. Social History:   TOBACCO:   reports that she has never smoked.  She has never used smokeless tobacco.  ETOH:   reports that she does not drink alcohol. Family History:       Problem Relation Age of Onset    High Blood Pressure Mother     Heart Failure Mother     Other Mother         DJD    Stroke Father     Heart Disease Sister     High Blood Pressure Sister     High Cholesterol Sister     Cancer Sister         metastatic endometrial CA, Cause of death    Other Brother         CAD       Review of Systems:    Constitutional: positive for malaise, negative for fevers  Eyes: negative  Ears, nose, mouth, throat, and face: negative  Respiratory: positive for dyspnea on exertion and shortness of breath, negative for hemoptysis and pleurisy/chest pain  Cardiovascular: positive for dyspnea, irregular heart beat and lower extremity edema, negative for exertional chest pressure/discomfort  Gastrointestinal: negative for melena  Genitourinary:negative  Integument/breast: negative  Hematologic/lymphatic: negative  Musculoskeletal:negative  Neurological: negative  Behavioral/Psych: negative  Endocrine: negative  Allergic/Immunologic: negative        Objective:    Vitals:   Vitals:    03/08/19 1230   BP: 98/61   Pulse: 76   Resp: 20   Temp: 97.9 °F (36.6 °C)   SpO2: 96%     Weight: 118 lb 3 oz (53.6 kg)       -----------------------------------------------------------------    Exam:  GEN:    Awake, alert and oriented x 3. moderate acute distress. Well developed / well nourished. EYES:  EOMI, pupils equal   ENT:  Neck supple. No lymphadenopathy. No carotid bruit  CVS:    irrreg irreg with 2/6 systolic m left sternal boreder  PULM: rales in bases  ABD:    Bowels sounds normal.  Abdomen is soft. No distention. No tenderness. EXT:   2+ and no edema bilaterally . No calf tenderness. NEURO: Motor and sensory are intact  SKIN:  No rashes. No skin lesions.     PSYCH:  Normal mood and affect  -----------------------------------------------------------------  Diagnostic Data: · All diagnostic data was reviewed    Assessment:         Active Problems:    Acute on chronic diastolic CHF (congestive heart failure), NYHA class 3 (HCC)    ASHD (arteriosclerotic heart disease)    Iron deficiency anemia    Pulmonary hypertension (HCC)    CHF (congestive heart failure), NYHA class I, acute on chronic, systolic (HCC)    Acute on chronic renal insufficiency  Resolved Problems:    * No resolved hospital problems.  *      Plan:     · This patient requires inpatient admission because of chf and pulmoniary htn    · Factors affecting the medical complexity of this patient include advanced age  · Estimated length of stay is 3 days  · I spoke with DR Telly Vargas and we will diurese and place on oxygen  She will need O2 for homegoing  We will keep her on sildenafil due to expense  She may need to go back on adcirca if she doestnt improve   We will investigate the possiblities of aother sources    Carrie Pate MD

## 2019-03-09 LAB
ABSOLUTE EOS #: 0.15 K/UL (ref 0–0.44)
ABSOLUTE IMMATURE GRANULOCYTE: <0.03 K/UL (ref 0–0.3)
ABSOLUTE LYMPH #: 1.41 K/UL (ref 1.1–3.7)
ABSOLUTE MONO #: 0.48 K/UL (ref 0.1–1.2)
ANION GAP SERPL CALCULATED.3IONS-SCNC: 13 MMOL/L (ref 9–17)
BASOPHILS # BLD: 1 % (ref 0–2)
BASOPHILS ABSOLUTE: 0.04 K/UL (ref 0–0.2)
BUN BLDV-MCNC: 41 MG/DL (ref 8–23)
BUN/CREAT BLD: 28 (ref 9–20)
CALCIUM SERPL-MCNC: 9.4 MG/DL (ref 8.6–10.4)
CHLORIDE BLD-SCNC: 107 MMOL/L (ref 98–107)
CHOLESTEROL/HDL RATIO: 4.4
CHOLESTEROL: 164 MG/DL
CO2: 23 MMOL/L (ref 20–31)
CREAT SERPL-MCNC: 1.45 MG/DL (ref 0.5–0.9)
DIFFERENTIAL TYPE: ABNORMAL
EKG ATRIAL RATE: 267 BPM
EKG Q-T INTERVAL: 406 MS
EKG QRS DURATION: 94 MS
EKG QTC CALCULATION (BAZETT): 366 MS
EKG R AXIS: 114 DEGREES
EKG T AXIS: 38 DEGREES
EKG VENTRICULAR RATE: 49 BPM
EOSINOPHILS RELATIVE PERCENT: 3 % (ref 1–4)
GFR AFRICAN AMERICAN: 41 ML/MIN
GFR NON-AFRICAN AMERICAN: 34 ML/MIN
GFR SERPL CREATININE-BSD FRML MDRD: ABNORMAL ML/MIN/{1.73_M2}
GFR SERPL CREATININE-BSD FRML MDRD: ABNORMAL ML/MIN/{1.73_M2}
GLUCOSE BLD-MCNC: 98 MG/DL (ref 70–99)
HCT VFR BLD CALC: 32.3 % (ref 36.3–47.1)
HDLC SERPL-MCNC: 37 MG/DL
HEMOGLOBIN: 10.3 G/DL (ref 11.9–15.1)
IMMATURE GRANULOCYTES: 0 %
INR BLD: 1.2 (ref 0.9–1.2)
LDL CHOLESTEROL: 110 MG/DL (ref 0–130)
LYMPHOCYTES # BLD: 25 % (ref 24–43)
MAGNESIUM: 2.1 MG/DL (ref 1.6–2.6)
MCH RBC QN AUTO: 32.1 PG (ref 25.2–33.5)
MCHC RBC AUTO-ENTMCNC: 31.9 G/DL (ref 28.4–34.8)
MCV RBC AUTO: 100.6 FL (ref 82.6–102.9)
MONOCYTES # BLD: 9 % (ref 3–12)
NRBC AUTOMATED: 0 PER 100 WBC
PDW BLD-RTO: 15.5 % (ref 11.8–14.4)
PLATELET # BLD: 161 K/UL (ref 138–453)
PLATELET ESTIMATE: ABNORMAL
PMV BLD AUTO: 12.3 FL (ref 8.1–13.5)
POTASSIUM SERPL-SCNC: 4.5 MMOL/L (ref 3.7–5.3)
PROTHROMBIN TIME: 12.5 SEC (ref 9.7–12.2)
RBC # BLD: 3.21 M/UL (ref 3.95–5.11)
RBC # BLD: ABNORMAL 10*6/UL
SEG NEUTROPHILS: 62 % (ref 36–65)
SEGMENTED NEUTROPHILS ABSOLUTE COUNT: 3.49 K/UL (ref 1.5–8.1)
SODIUM BLD-SCNC: 143 MMOL/L (ref 135–144)
TRIGL SERPL-MCNC: 85 MG/DL
VLDLC SERPL CALC-MCNC: ABNORMAL MG/DL (ref 1–30)
WBC # BLD: 5.6 K/UL (ref 3.5–11.3)
WBC # BLD: ABNORMAL 10*3/UL

## 2019-03-09 PROCEDURE — 83735 ASSAY OF MAGNESIUM: CPT

## 2019-03-09 PROCEDURE — 36415 COLL VENOUS BLD VENIPUNCTURE: CPT

## 2019-03-09 PROCEDURE — 6360000002 HC RX W HCPCS: Performed by: FAMILY MEDICINE

## 2019-03-09 PROCEDURE — 1200000000 HC SEMI PRIVATE

## 2019-03-09 PROCEDURE — 80061 LIPID PANEL: CPT

## 2019-03-09 PROCEDURE — 99232 SBSQ HOSP IP/OBS MODERATE 35: CPT | Performed by: FAMILY MEDICINE

## 2019-03-09 PROCEDURE — 2580000003 HC RX 258: Performed by: FAMILY MEDICINE

## 2019-03-09 PROCEDURE — 6370000000 HC RX 637 (ALT 250 FOR IP): Performed by: FAMILY MEDICINE

## 2019-03-09 PROCEDURE — 94760 N-INVAS EAR/PLS OXIMETRY 1: CPT

## 2019-03-09 PROCEDURE — 85610 PROTHROMBIN TIME: CPT

## 2019-03-09 PROCEDURE — 80048 BASIC METABOLIC PNL TOTAL CA: CPT

## 2019-03-09 PROCEDURE — 85025 COMPLETE CBC W/AUTO DIFF WBC: CPT

## 2019-03-09 RX ORDER — FUROSEMIDE 40 MG/1
40 TABLET ORAL 2 TIMES DAILY
Status: DISCONTINUED | OUTPATIENT
Start: 2019-03-09 | End: 2019-03-10 | Stop reason: HOSPADM

## 2019-03-09 RX ORDER — AMLODIPINE BESYLATE 5 MG/1
2.5 TABLET ORAL DAILY
Status: DISCONTINUED | OUTPATIENT
Start: 2019-03-10 | End: 2019-03-10 | Stop reason: HOSPADM

## 2019-03-09 RX ADMIN — ROPINIROLE HYDROCHLORIDE 0.5 MG: 0.25 TABLET, FILM COATED ORAL at 21:37

## 2019-03-09 RX ADMIN — FUROSEMIDE 40 MG: 40 TABLET ORAL at 16:58

## 2019-03-09 RX ADMIN — CARVEDILOL 6.25 MG: 6.25 TABLET, FILM COATED ORAL at 16:58

## 2019-03-09 RX ADMIN — SPIRONOLACTONE 25 MG: 25 TABLET ORAL at 08:29

## 2019-03-09 RX ADMIN — APIXABAN 2.5 MG: 2.5 TABLET, FILM COATED ORAL at 21:37

## 2019-03-09 RX ADMIN — Medication 10 ML: at 21:38

## 2019-03-09 RX ADMIN — Medication 10 ML: at 08:28

## 2019-03-09 RX ADMIN — APIXABAN 2.5 MG: 2.5 TABLET, FILM COATED ORAL at 08:28

## 2019-03-09 RX ADMIN — LEVOTHYROXINE SODIUM 100 MCG: 100 TABLET ORAL at 08:28

## 2019-03-09 RX ADMIN — FUROSEMIDE 40 MG: 10 INJECTION, SOLUTION INTRAMUSCULAR; INTRAVENOUS at 08:28

## 2019-03-09 RX ADMIN — AMLODIPINE BESYLATE 5 MG: 5 TABLET ORAL at 08:29

## 2019-03-09 RX ADMIN — CARVEDILOL 6.25 MG: 6.25 TABLET, FILM COATED ORAL at 08:32

## 2019-03-09 NOTE — PLAN OF CARE
Problem: Cardiac:  Goal: Ability to maintain an adequate cardiac output will improve  Description  Ability to maintain an adequate cardiac output will improve  Outcome: Ongoing  Note:   Vital signs and I&O monitored. Problem: Fluid Volume:  Goal: Will show no signs and symptoms of electrolyte imbalance  Description  Will show no signs and symptoms of electrolyte imbalance  Outcome: Ongoing  Note:   Labs and I&O monitored. Problem: Respiratory:  Goal: Respiratory status will improve  Description  Respiratory status will improve  Outcome: Ongoing  Note:   Pt has maintained SPO2 above 90% on room air thus far this shift. Problem: Falls - Risk of:  Goal: Will remain free from falls  Description  Will remain free from falls  Outcome: Ongoing  Note:   Pt's bed in lowest position, wheels locked. Call light and bedside table within reach. Bed alarm in place. Floor remains free of clutter. Pt is A&O, demonstrates proper use of call light. Will continue to monitor and intervene as appropriate.

## 2019-03-09 NOTE — PROGRESS NOTES
Progress Note    SUBJECTIVE:  Feels much better  Less dyspnea  No dizziness  No  Pain  Ready to go home  OBJECTIVE:    Vitals:   Vitals:    03/09/19 0717   BP: 112/68   Pulse: 75   Resp: 16   Temp: 98.9 °F (37.2 °C)   SpO2:      Weight: 117 lb 14.4 oz (53.5 kg)   Height: 5' 3\" (160 cm)   -----------------------------------------------------------------  Exam:    CONSTITUTIONAL:  awake, alert, cooperative and no apparent distress  EYES:  lids and lashes normal and pupils equal, round and reactive to light  ENT:  normocepalic, without obvious abnormality  NECK:  supple, symmetrical, trachea midline  HEMATOLOGIC/LYMPHATICS:  no cervical lymphadenopathy  BACK:  symmetric  LUNGS:  no increased work of breathing and diminished breath sounds right base  CARDIOVASCULAR:  normal apical pulses, irregularly irregular rhythm, normal S1 and S2 and murmurs include systolic murmur II/VI located at left lower sternal border without radiation  ABDOMEN:  non-distended and non-tender    MUSCULOSKELETAL:  there is no redness, warmth, or swelling of the joints  NEUROLOGIC:  Mental Status Exam:  Level of Alertness:   awake  Orientation:   person, place, time  Cranial Nerves:  cranial nerves II-XII are grossly intact  SKIN:  no bruising or bleeding  EXT:     edema: 1+ affecting both foot, both ankle  -----------------------------------------------------------------  Diagnostic Data: Reviewed    ASSESSMENT:   Principal Problem:    Acute on chronic diastolic CHF (congestive heart failure), NYHA class 3 (HCC)  Active Problems:    ASHD (arteriosclerotic heart disease)    Iron deficiency anemia    Pulmonary hypertension (HCC)    Chronic atrial fibrillation (HCC)    CHF (congestive heart failure), NYHA class I, acute on chronic, systolic (HCC)    Acute on chronic renal insufficiency    Chronic anticoagulation  Resolved Problems:    * No resolved hospital problems.  *        PLAN:  · We wiill change amlodipine  We already lowered coreg dose  Will change lasix to po  We notice renal impairment improved  Will check for home O2 appropriateness  Plan discharge tomorrow and we will resume sildenafil at lower dose on discharge  Not available in house at this time      Martha Vail M.D.

## 2019-03-09 NOTE — PLAN OF CARE
Problem: Cardiac:  Goal: Ability to maintain an adequate cardiac output will improve  Description  Ability to maintain an adequate cardiac output will improve  3/9/2019 1757 by Rachid Tidwell RN  Outcome: Ongoing  Note:   Vitals within normal limits, telemetry monitoring in place. Problem: Coping:  Goal: Verbalizations of decreased anxiety will decrease  Description  Verbalizations of decreased anxiety will decrease  3/9/2019 1757 by Rachid Tidwell RN  Outcome: Ongoing  Note:   No anxiety noted. Problem: Fluid Volume:  Goal: Will show no signs and symptoms of electrolyte imbalance  Description  Will show no signs and symptoms of electrolyte imbalance  3/9/2019 1757 by Rachid Tidwell RN  Outcome: Ongoing  Note:   Monitor lab work and intake and output. Problem: Cardiac:  Goal: Ability to maintain an adequate cardiac output will improve  Description  Ability to maintain an adequate cardiac output will improve  3/9/2019 1757 by Rachid Tidwell RN  Outcome: Ongoing  Note:   Vitals within normal limits, telemetry monitoring in place. 3/9/2019 1753 by Rachid Tidwell RN  Outcome: Ongoing  Note:   Vitals within normal limits, telemetry monitoring in place. Problem: Coping:  Goal: Verbalizations of decreased anxiety will decrease  Description  Verbalizations of decreased anxiety will decrease  3/9/2019 1757 by Rachid Tidwell RN  Outcome: Ongoing  Note:   No anxiety noted. Problem: Fluid Volume:  Goal: Will show no signs and symptoms of electrolyte imbalance  Description  Will show no signs and symptoms of electrolyte imbalance  3/9/2019 1757 by Rachid Tidwell RN  Outcome: Ongoing  Note:   Monitor lab work and intake and output. Problem: Respiratory:  Goal: Respiratory status will improve  Description  Respiratory status will improve  3/9/2019 1757 by Rachid Tidwell RN  Outcome: Ongoing  Note:   Crackles noted to lungs. On room air.         Problem: Falls - Risk of:  Goal: Will remain free from falls  Description  Will remain free from falls  3/9/2019 1757 by Cristobal Jarvis RN  Outcome: Ongoing  Note:   Call light in reach at all times, frequent checks, bed in lowest position, wheels of bed and chair locked, non skid footwear on, appropriate transfer techniques, personal items within reach, walkways free of obstructions, fall risk armband and sign displayed, Holt fall risk score per protocol. No falls this shift, will continue to monitor.

## 2019-03-09 NOTE — PROGRESS NOTES
[facility]          \[facility] [place] [facility]\""               [address]        Patient Name: Natacha Mehta 10/13/1927       A home oxygen evaluation has been completed. Patient was on room air. SpO2 was 95 % on room air at rest. Room air SpO2 with activity 91%    Patient did not qualify for home O2.

## 2019-03-10 VITALS
SYSTOLIC BLOOD PRESSURE: 107 MMHG | DIASTOLIC BLOOD PRESSURE: 61 MMHG | RESPIRATION RATE: 16 BRPM | HEIGHT: 63 IN | TEMPERATURE: 100.1 F | BODY MASS INDEX: 20.79 KG/M2 | HEART RATE: 83 BPM | WEIGHT: 117.3 LBS | OXYGEN SATURATION: 92 %

## 2019-03-10 LAB
ANION GAP SERPL CALCULATED.3IONS-SCNC: 15 MMOL/L (ref 9–17)
BNP INTERPRETATION: ABNORMAL
BUN BLDV-MCNC: 34 MG/DL (ref 8–23)
BUN/CREAT BLD: 26 (ref 9–20)
CALCIUM SERPL-MCNC: 9.7 MG/DL (ref 8.6–10.4)
CHLORIDE BLD-SCNC: 104 MMOL/L (ref 98–107)
CO2: 24 MMOL/L (ref 20–31)
CREAT SERPL-MCNC: 1.29 MG/DL (ref 0.5–0.9)
GFR AFRICAN AMERICAN: 47 ML/MIN
GFR NON-AFRICAN AMERICAN: 39 ML/MIN
GFR SERPL CREATININE-BSD FRML MDRD: ABNORMAL ML/MIN/{1.73_M2}
GFR SERPL CREATININE-BSD FRML MDRD: ABNORMAL ML/MIN/{1.73_M2}
GLUCOSE BLD-MCNC: 97 MG/DL (ref 70–99)
POTASSIUM SERPL-SCNC: 4.1 MMOL/L (ref 3.7–5.3)
PRO-BNP: 3939 PG/ML
SODIUM BLD-SCNC: 143 MMOL/L (ref 135–144)

## 2019-03-10 PROCEDURE — 2580000003 HC RX 258: Performed by: FAMILY MEDICINE

## 2019-03-10 PROCEDURE — 36415 COLL VENOUS BLD VENIPUNCTURE: CPT

## 2019-03-10 PROCEDURE — 6370000000 HC RX 637 (ALT 250 FOR IP): Performed by: FAMILY MEDICINE

## 2019-03-10 PROCEDURE — 80048 BASIC METABOLIC PNL TOTAL CA: CPT

## 2019-03-10 PROCEDURE — 83880 ASSAY OF NATRIURETIC PEPTIDE: CPT

## 2019-03-10 PROCEDURE — 99239 HOSP IP/OBS DSCHRG MGMT >30: CPT | Performed by: FAMILY MEDICINE

## 2019-03-10 RX ORDER — AMLODIPINE BESYLATE 2.5 MG/1
2.5 TABLET ORAL DAILY
Qty: 30 TABLET | Refills: 3 | Status: SHIPPED | OUTPATIENT
Start: 2019-03-11 | End: 2019-06-15 | Stop reason: SDUPTHER

## 2019-03-10 RX ORDER — CARVEDILOL 6.25 MG/1
6.25 TABLET ORAL 2 TIMES DAILY WITH MEALS
Qty: 60 TABLET | Refills: 3 | Status: SHIPPED | OUTPATIENT
Start: 2019-03-10 | End: 2019-07-09 | Stop reason: SDUPTHER

## 2019-03-10 RX ADMIN — CARVEDILOL 6.25 MG: 6.25 TABLET, FILM COATED ORAL at 08:48

## 2019-03-10 RX ADMIN — APIXABAN 2.5 MG: 2.5 TABLET, FILM COATED ORAL at 08:48

## 2019-03-10 RX ADMIN — AMLODIPINE BESYLATE 2.5 MG: 5 TABLET ORAL at 08:48

## 2019-03-10 RX ADMIN — SPIRONOLACTONE 25 MG: 25 TABLET ORAL at 08:48

## 2019-03-10 RX ADMIN — FUROSEMIDE 40 MG: 40 TABLET ORAL at 08:48

## 2019-03-10 RX ADMIN — Medication 10 ML: at 08:51

## 2019-03-10 RX ADMIN — LEVOTHYROXINE SODIUM 100 MCG: 100 TABLET ORAL at 08:48

## 2019-03-10 NOTE — PLAN OF CARE
Problem: Cardiac:  Goal: Hemodynamic stability will improve  Description  Hemodynamic stability will improve  Outcome: Ongoing  Note:   Vital signs and I&O monitored. Pt on telemetry. Problem: Fluid Volume:  Goal: Will show no signs and symptoms of electrolyte imbalance  Description  Will show no signs and symptoms of electrolyte imbalance  3/10/2019 0336 by Jefry Bill RN  Outcome: Ongoing  Note:   Labs and I&O monitored. Problem: Respiratory:  Goal: Respiratory status will improve  Description  Respiratory status will improve  3/10/2019 0336 by Jefry Bill RN  Outcome: Ongoing  Note:   Pt is maintaining SPO2 above 90% on room air. Problem: Falls - Risk of:  Goal: Will remain free from falls  Description  Will remain free from falls  3/10/2019 0336 by Jefry Bill RN  Outcome: Ongoing  Note:   Pt's bed in lowest position, wheels locked. Call light and bedside table within reach. Pt is A&O, demonstrates proper use of call light. Pt is from home alone and safely ambulates independently in room with walker. Floor remains free of clutter. Pt verbally contracts to call for assistance when needed. Will continue to monitor.

## 2019-03-10 NOTE — DISCHARGE SUMMARY
03/09/2019    HGB 10.3 (L) 03/09/2019     03/09/2019       Lab Results   Component Value Date    BUN 34 (H) 03/10/2019    CREATININE 1.29 (H) 03/10/2019     03/10/2019    K 4.1 03/10/2019    CALCIUM 9.7 03/10/2019     03/10/2019    CO2 24 03/10/2019    LABGLOM 39 (L) 03/10/2019       Lab Results   Component Value Date    WBCUA 10 TO 20 10/27/2017    RBCUA 0 11/02/2017    EPITHUA 0 TO 2 10/27/2017    LEUKOCYTESUR NEGATIVE 08/26/2018    SPECGRAV 1.010 08/26/2018    GLUCOSEU NEGATIVE 08/26/2018    KETUA NEGATIVE 08/26/2018    PROTEINU NEGATIVE 08/26/2018    HGBUR NEGATIVE 08/26/2018    CASTUA NOT REPORTED 10/27/2017    CRYSTUA NOT REPORTED 10/27/2017    BACTERIA 0 11/02/2017    YEAST 0 11/02/2017       Xr Chest Standard (2 Vw)    Result Date: 3/8/2019  EXAMINATION: TWO VIEWS OF THE CHEST 3/8/2019 4:25 pm COMPARISON: November 27, 2018 HISTORY: ORDERING SYSTEM PROVIDED HISTORY: dyspnea TECHNOLOGIST PROVIDED HISTORY: dyspnea FINDINGS: Large hiatal hernia. Cardiomegaly. Mediastinum normal.  Mild edema. Left lower lobe infiltrate. Moderate right pleural effusion and small left pleural effusion. Bony thorax intact. No change left lower lobe infiltrate and moderate right effusion. Mild edema unchanged. Large hiatal hernia. Discharge Diagnoses:    Principal Problem:    Acute on chronic diastolic CHF (congestive heart failure), NYHA class 3 (HCC)  Active Problems:    ASHD (arteriosclerotic heart disease)    Acute renal failure with acute tubular necrosis superimposed on stage 3 chronic kidney disease (HCC)    Iron deficiency anemia    Pulmonary hypertension (HCC)    Chronic atrial fibrillation (HCC)    CHF (congestive heart failure), NYHA class I, acute on chronic, systolic (HCC)    Acute on chronic renal insufficiency    Chronic anticoagulation  Resolved Problems:    * No resolved hospital problems.  *      Active Hospital Problems    Diagnosis Date Noted    Acute on chronic diastolic CHF sildenafil (REVATIO) 20 MG tablet  sildenafil (antihypertensive) 20 mg tablet   Take 1 tablet 3 times a day by oral route for 90 days. spironolactone (ALDACTONE) 25 MG tablet  Take 1 tablet by mouth daily                 Patient Instructions: Activity: ambulate in house  Diet: cardiac diet  Wound Care: none needed  Other: resume sildanafil at 20 mg twice a day only     Disposition:   Discharge to Home    Follow up:  Patient will be followed by Alfred Morocho MD in 1-2 weeks    CORE MEASURES on Discharge (if applicable)  ACE/ARB in CHF: held due to renal impairment  Statin in MI: NA  ASA in MI: NA  Statin in CVA: NA  Antiplatelet in CVA: NA    Total time spent on discharge services: 40 minutes    Including the following activities:  Evaluation and Management of patient  Discussion with patient and/or surrogate about current care plan  Coordination with Case Management and/or   Coordination of care with Consultants (if applicable)   Coordination of care with Receiving Facility Physician (if applicable)  Completion of DME forms (if applicable)  Preparation of Discharge Summary  Preparation of Medication Reconciliation  Preparation of Discharge Prescriptions    Signed:   Alfred Morocho MD  3/16/2019, 9:27 AM

## 2019-03-10 NOTE — PROGRESS NOTES
Due to daylight savings, time changed from 0159 to 0300. No charting applicable for 4576. Will continue to monitor patient.

## 2019-03-10 NOTE — PROGRESS NOTES
Discharge paperwork gone over with the patient and patients daughter at this time, understanding noted from both. Patient aware she needs to call tomorrow AM to make a follow up appt with .

## 2019-03-11 ENCOUNTER — CARE COORDINATION (OUTPATIENT)
Dept: CASE MANAGEMENT | Age: 84
End: 2019-03-11

## 2019-03-11 ENCOUNTER — TELEPHONE (OUTPATIENT)
Dept: PHARMACY | Facility: CLINIC | Age: 84
End: 2019-03-11

## 2019-03-16 PROBLEM — N17.0 ACUTE RENAL FAILURE WITH ACUTE TUBULAR NECROSIS SUPERIMPOSED ON STAGE 3 CHRONIC KIDNEY DISEASE (HCC): Status: ACTIVE | Noted: 2017-01-30

## 2019-03-19 ENCOUNTER — OFFICE VISIT (OUTPATIENT)
Dept: FAMILY MEDICINE CLINIC | Age: 84
End: 2019-03-19
Payer: MEDICARE

## 2019-03-19 VITALS
SYSTOLIC BLOOD PRESSURE: 116 MMHG | HEIGHT: 63 IN | BODY MASS INDEX: 20.02 KG/M2 | WEIGHT: 113 LBS | DIASTOLIC BLOOD PRESSURE: 72 MMHG

## 2019-03-19 DIAGNOSIS — I95.1 ORTHOSTASIS: ICD-10-CM

## 2019-03-19 DIAGNOSIS — I27.20 PULMONARY HYPERTENSION (HCC): Primary | ICD-10-CM

## 2019-03-19 DIAGNOSIS — I50.32 CHRONIC DIASTOLIC CONGESTIVE HEART FAILURE (HCC): ICD-10-CM

## 2019-03-19 PROCEDURE — 1111F DSCHRG MED/CURRENT MED MERGE: CPT | Performed by: FAMILY MEDICINE

## 2019-03-19 PROCEDURE — G8420 CALC BMI NORM PARAMETERS: HCPCS | Performed by: FAMILY MEDICINE

## 2019-03-19 PROCEDURE — 1090F PRES/ABSN URINE INCON ASSESS: CPT | Performed by: FAMILY MEDICINE

## 2019-03-19 PROCEDURE — G8427 DOCREV CUR MEDS BY ELIG CLIN: HCPCS | Performed by: FAMILY MEDICINE

## 2019-03-19 PROCEDURE — 4040F PNEUMOC VAC/ADMIN/RCVD: CPT | Performed by: FAMILY MEDICINE

## 2019-03-19 PROCEDURE — G8482 FLU IMMUNIZE ORDER/ADMIN: HCPCS | Performed by: FAMILY MEDICINE

## 2019-03-19 PROCEDURE — 99214 OFFICE O/P EST MOD 30 MIN: CPT | Performed by: FAMILY MEDICINE

## 2019-03-19 PROCEDURE — 1101F PT FALLS ASSESS-DOCD LE1/YR: CPT | Performed by: FAMILY MEDICINE

## 2019-03-19 PROCEDURE — 1123F ACP DISCUSS/DSCN MKR DOCD: CPT | Performed by: FAMILY MEDICINE

## 2019-03-19 PROCEDURE — G8598 ASA/ANTIPLAT THER USED: HCPCS | Performed by: FAMILY MEDICINE

## 2019-03-19 PROCEDURE — 1036F TOBACCO NON-USER: CPT | Performed by: FAMILY MEDICINE

## 2019-03-19 ASSESSMENT — PATIENT HEALTH QUESTIONNAIRE - PHQ9
2. FEELING DOWN, DEPRESSED OR HOPELESS: 0
SUM OF ALL RESPONSES TO PHQ QUESTIONS 1-9: 0
SUM OF ALL RESPONSES TO PHQ QUESTIONS 1-9: 0
SUM OF ALL RESPONSES TO PHQ9 QUESTIONS 1 & 2: 0
1. LITTLE INTEREST OR PLEASURE IN DOING THINGS: 0

## 2019-03-29 ENCOUNTER — APPOINTMENT (OUTPATIENT)
Dept: CT IMAGING | Age: 84
DRG: 391 | End: 2019-03-29
Payer: MEDICARE

## 2019-03-29 ENCOUNTER — TELEPHONE (OUTPATIENT)
Dept: OTHER | Facility: CLINIC | Age: 84
End: 2019-03-29

## 2019-03-29 ENCOUNTER — HOSPITAL ENCOUNTER (EMERGENCY)
Age: 84
Discharge: HOME | DRG: 391 | End: 2019-03-29
Payer: MEDICARE

## 2019-03-29 VITALS
RESPIRATION RATE: 18 BRPM | SYSTOLIC BLOOD PRESSURE: 133 MMHG | DIASTOLIC BLOOD PRESSURE: 74 MMHG | TEMPERATURE: 98.1 F | HEIGHT: 63 IN | WEIGHT: 153 LBS | HEART RATE: 90 BPM | BODY MASS INDEX: 27.11 KG/M2 | OXYGEN SATURATION: 95 %

## 2019-03-29 DIAGNOSIS — K56.600 PARTIAL SMALL BOWEL OBSTRUCTION (HCC): Primary | ICD-10-CM

## 2019-03-29 LAB
-: ABNORMAL
ABSOLUTE EOS #: 0.08 K/UL (ref 0–0.44)
ABSOLUTE IMMATURE GRANULOCYTE: <0.03 K/UL (ref 0–0.3)
ABSOLUTE LYMPH #: 1.64 K/UL (ref 1.1–3.7)
ABSOLUTE MONO #: 0.47 K/UL (ref 0.1–1.2)
ALBUMIN SERPL-MCNC: 3.9 G/DL (ref 3.5–5.2)
ALBUMIN/GLOBULIN RATIO: 1.1 (ref 1–2.5)
ALP BLD-CCNC: 109 U/L (ref 35–104)
ALT SERPL-CCNC: 17 U/L (ref 5–33)
AMORPHOUS: ABNORMAL
ANION GAP SERPL CALCULATED.3IONS-SCNC: 16 MMOL/L (ref 9–17)
AST SERPL-CCNC: 24 U/L
BACTERIA: ABNORMAL
BASOPHILS # BLD: 1 % (ref 0–2)
BASOPHILS ABSOLUTE: 0.03 K/UL (ref 0–0.2)
BILIRUB SERPL-MCNC: 0.71 MG/DL (ref 0.3–1.2)
BILIRUBIN URINE: NEGATIVE
BUN BLDV-MCNC: 20 MG/DL (ref 8–23)
BUN/CREAT BLD: 21 (ref 9–20)
CALCIUM SERPL-MCNC: 10.1 MG/DL (ref 8.6–10.4)
CASTS UA: ABNORMAL /LPF
CHLORIDE BLD-SCNC: 99 MMOL/L (ref 98–107)
CO2: 22 MMOL/L (ref 20–31)
COLOR: YELLOW
COMMENT UA: ABNORMAL
CREAT SERPL-MCNC: 0.97 MG/DL (ref 0.5–0.9)
CRYSTALS, UA: ABNORMAL /HPF
DIFFERENTIAL TYPE: ABNORMAL
EOSINOPHILS RELATIVE PERCENT: 1 % (ref 1–4)
EPITHELIAL CELLS UA: ABNORMAL /HPF (ref 0–25)
GFR AFRICAN AMERICAN: >60 ML/MIN
GFR NON-AFRICAN AMERICAN: 54 ML/MIN
GFR SERPL CREATININE-BSD FRML MDRD: ABNORMAL ML/MIN/{1.73_M2}
GFR SERPL CREATININE-BSD FRML MDRD: ABNORMAL ML/MIN/{1.73_M2}
GLUCOSE BLD-MCNC: 94 MG/DL (ref 70–99)
GLUCOSE URINE: NEGATIVE
HCT VFR BLD CALC: 35.7 % (ref 36.3–47.1)
HEMOGLOBIN: 11.9 G/DL (ref 11.9–15.1)
IMMATURE GRANULOCYTES: 0 %
KETONES, URINE: NEGATIVE
LEUKOCYTE ESTERASE, URINE: NEGATIVE
LIPASE: 48 U/L (ref 13–60)
LYMPHOCYTES # BLD: 27 % (ref 24–43)
MCH RBC QN AUTO: 32.2 PG (ref 25.2–33.5)
MCHC RBC AUTO-ENTMCNC: 33.3 G/DL (ref 28.4–34.8)
MCV RBC AUTO: 96.7 FL (ref 82.6–102.9)
MONOCYTES # BLD: 8 % (ref 3–12)
MUCUS: ABNORMAL
NITRITE, URINE: NEGATIVE
NRBC AUTOMATED: 0 PER 100 WBC
OTHER OBSERVATIONS UA: ABNORMAL
PDW BLD-RTO: 15.1 % (ref 11.8–14.4)
PH UA: 6 (ref 5–9)
PLATELET # BLD: 212 K/UL (ref 138–453)
PLATELET ESTIMATE: ABNORMAL
PMV BLD AUTO: 11.4 FL (ref 8.1–13.5)
POTASSIUM SERPL-SCNC: 4.2 MMOL/L (ref 3.7–5.3)
PROTEIN UA: NEGATIVE
RBC # BLD: 3.69 M/UL (ref 3.95–5.11)
RBC # BLD: ABNORMAL 10*6/UL
RBC UA: ABNORMAL /HPF (ref 0–2)
RENAL EPITHELIAL, UA: ABNORMAL /HPF
SEG NEUTROPHILS: 63 % (ref 36–65)
SEGMENTED NEUTROPHILS ABSOLUTE COUNT: 3.84 K/UL (ref 1.5–8.1)
SODIUM BLD-SCNC: 137 MMOL/L (ref 135–144)
SPECIFIC GRAVITY UA: <1.005 (ref 1.01–1.02)
TOTAL PROTEIN: 7.4 G/DL (ref 6.4–8.3)
TRICHOMONAS: ABNORMAL
TURBIDITY: CLEAR
URINE HGB: NEGATIVE
UROBILINOGEN, URINE: NORMAL
WBC # BLD: 6.1 K/UL (ref 3.5–11.3)
WBC # BLD: ABNORMAL 10*3/UL
WBC UA: ABNORMAL /HPF (ref 0–5)
YEAST: ABNORMAL

## 2019-03-29 PROCEDURE — 6360000002 HC RX W HCPCS: Performed by: PHYSICIAN ASSISTANT

## 2019-03-29 PROCEDURE — 74176 CT ABD & PELVIS W/O CONTRAST: CPT

## 2019-03-29 PROCEDURE — 6370000000 HC RX 637 (ALT 250 FOR IP): Performed by: PHYSICIAN ASSISTANT

## 2019-03-29 PROCEDURE — 85025 COMPLETE CBC W/AUTO DIFF WBC: CPT

## 2019-03-29 PROCEDURE — 99284 EMERGENCY DEPT VISIT MOD MDM: CPT

## 2019-03-29 PROCEDURE — 83690 ASSAY OF LIPASE: CPT

## 2019-03-29 PROCEDURE — 36415 COLL VENOUS BLD VENIPUNCTURE: CPT

## 2019-03-29 PROCEDURE — 80053 COMPREHEN METABOLIC PANEL: CPT

## 2019-03-29 PROCEDURE — 96372 THER/PROPH/DIAG INJ SC/IM: CPT

## 2019-03-29 PROCEDURE — 81001 URINALYSIS AUTO W/SCOPE: CPT

## 2019-03-29 RX ORDER — MORPHINE SULFATE 2 MG/ML
2 INJECTION, SOLUTION INTRAMUSCULAR; INTRAVENOUS ONCE
Status: COMPLETED | OUTPATIENT
Start: 2019-03-29 | End: 2019-03-29

## 2019-03-29 RX ORDER — ONDANSETRON 4 MG/1
4 TABLET, ORALLY DISINTEGRATING ORAL ONCE
Status: COMPLETED | OUTPATIENT
Start: 2019-03-29 | End: 2019-03-29

## 2019-03-29 RX ORDER — HYDROCODONE BITARTRATE AND ACETAMINOPHEN 5; 325 MG/1; MG/1
0.5 TABLET ORAL EVERY 8 HOURS PRN
Qty: 8 TABLET | Refills: 0 | Status: ON HOLD | OUTPATIENT
Start: 2019-03-29 | End: 2019-04-04 | Stop reason: HOSPADM

## 2019-03-29 RX ADMIN — MORPHINE SULFATE 2 MG: 2 INJECTION, SOLUTION INTRAMUSCULAR; INTRAVENOUS at 14:33

## 2019-03-29 RX ADMIN — ONDANSETRON 4 MG: 4 TABLET, ORALLY DISINTEGRATING ORAL at 14:33

## 2019-03-29 ASSESSMENT — PAIN SCALES - GENERAL
PAINLEVEL_OUTOF10: 10
PAINLEVEL_OUTOF10: 5
PAINLEVEL_OUTOF10: 3

## 2019-03-29 ASSESSMENT — PAIN DESCRIPTION - PAIN TYPE
TYPE: ACUTE PAIN
TYPE: ACUTE PAIN

## 2019-03-29 ASSESSMENT — PAIN DESCRIPTION - LOCATION
LOCATION: ABDOMEN
LOCATION: ABDOMEN

## 2019-03-29 NOTE — ED NOTES
Michelle Hill RN unsuccessful with IV guided with ultra sound.   Anesthesia contacted to see if they would have someone available to attempt     Jose Hodges RN  03/29/19 6887

## 2019-03-29 NOTE — ED NOTES
Pt asking if she could have some jello to eat.  This writer educated that we would like to wait until we get the CT scan resulted before we allow her to eat     Jose Hodges RN  03/29/19 5054

## 2019-03-29 NOTE — ED NOTES
Bed: 03  Expected date:   Expected time:   Means of arrival:   Comments:  Medic 1 Abdominal pain     Alyse CORTES Reser  03/29/19 1055

## 2019-03-29 NOTE — ED NOTES
Pt updated that we have anesthesia to attempt IV and that Temitope Hathaway will come in to talk to her about pain medication     Michelle Hodges, RN  03/29/19 1400

## 2019-03-29 NOTE — ED NOTES
Attempted to call dr. Begum Boards per CECILE Nix request. Out of [facility] A Reser  03/29/19 8363

## 2019-03-29 NOTE — ED PROVIDER NOTES
right ventricle is moderately to severely enlarged with preserved function. Severe tricuspid regurgitation.  Status post right heart catheterization 7/5/2012    RHC: Mean PA 26, PCWP 12.    Tricuspid regurgitation     MR 4/2002 ATB proph    Unspecified hypothyroidism 2/12/2015       SURGICAL HISTORY       Past Surgical History:   Procedure Laterality Date    CARDIAC CATHETERIZATION Left 8/1997    Dr. Jessica Leo  10/2015    Dr. Negar Rivera Bilateral 10/2014    Cataracts Surgery       CURRENT MEDICATIONS       Previous Medications    ACETAMINOPHEN (TYLENOL) 500 MG TABLET    Take 500 mg by mouth every 6 hours as needed for Pain or Fever    AMLODIPINE (NORVASC) 2.5 MG TABLET    Take 1 tablet by mouth daily    APIXABAN (ELIQUIS) 2.5 MG TABS TABLET    Take 1 tablet by mouth 2 times daily    ATORVASTATIN (LIPITOR) 40 MG TABLET    TAKE ONE TABLET BY MOUTH DAILY    CALCIUM CARBONATE (TUMS) 500 MG CHEWABLE TABLET    Take 1 tablet by mouth as needed     CARVEDILOL (COREG) 6.25 MG TABLET    Take 1 tablet by mouth 2 times daily (with meals)    FERROUS SULFATE 325 (65 FE) MG TABLET    Take 1 tablet by mouth 2 times daily (with meals)    FUROSEMIDE (LASIX) 20 MG TABLET    TAKE 1 TABLET DAILY IF THERE IS A 1 LB WEIGHT GAIN THEN TAKE 2 TABLETS ON THOSE DAYS    LEVOTHYROXINE (SYNTHROID) 100 MCG TABLET    TAKE ONE TABLET BY MOUTH DAILY    [facility]. DEVICES (STEP N REST WALKER) MISC    1 Device by Does not apply route continuous    OMEPRAZOLE (PRILOSEC) 40 MG DELAYED RELEASE CAPSULE    TAKE ONE CAPSULE BY MOUTH DAILY    ROPINIROLE (REQUIP) 0.5 MG TABLET    TAKE 1 TABLET BY MOUTH NIGHTLY    SILDENAFIL (REVATIO) 20 MG TABLET    sildenafil (antihypertensive) 20 mg tablet  Take 1 tablet 2  times a day by oral route for 90 days. SPIRONOLACTONE (ALDACTONE) 25 MG TABLET    Take 1 tablet by mouth daily       ALLERGIES     Patient has no known allergies.     FAMILY HISTORY       Family History   Problem Relation Age of Onset    High Blood Pressure Mother     Heart Failure Mother     Other Mother         DJD    Stroke Father     Heart Disease Sister     High Blood Pressure Sister     High Cholesterol Sister     Cancer Sister         metastatic endometrial CA, Cause of death    Other Brother         CAD        SOCIAL HISTORY       Social History     Socioeconomic History    Marital status:      Spouse name: Not on file    Number of children: Not on file    Years of education: Not on file    Highest education level: Not on file   Occupational History    Occupation: Retired     Comment: [employer]   Social Needs    Financial resource strain: Not on file    Food insecurity:     Worry: Not on file     Inability: Not on file   [employer] needs:     Medical: Not on file     Non-medical: Not on file   Tobacco Use    Smoking status: Never Smoker    Smokeless tobacco: Never Used   Substance and Sexual Activity    Alcohol use: No    Drug use: Not on file    Sexual activity: Not on file   Lifestyle    Physical activity:     Days per week: Not on file     Minutes per session: Not on file    Stress: Not on file   Relationships    Social connections:     Talks on phone: Not on file     Gets together: Not on file     Attends Evangelical service: Not on file     Active member of club or organization: Not on file     Attends meetings of clubs or organizations: Not on file     Relationship status: Not on file    Intimate partner violence:     Fear of current or ex partner: Not on file     Emotionally abused: Not on file     Physically abused: Not on file     Forced sexual activity: Not on file   Other Topics Concern    Not on file   Social History Narrative    Not on file       REVIEW OF SYSTEMS     Review of Systems  Except as noted above the remainder of the review of systems was reviewed and is negative.      PHYSICAL EXAM    (up to 7 for level 4, 8 or more for level 5)     ED Triage Vitals [03/29/19 1058]   BP Temp Temp src Pulse Resp SpO2 Height Weight   136/82 98.1 °F (36.7 °C) -- 81 (!) 32 96 % 5' 3\" (1.6 m) 153 lb (69.4 kg)       Physical Exam  Active and oriented ×3. Nontoxic. No acute distress. Well-hydrated. Head is atraumatic, facies symmetrical.  Pupils equal round and reactive to light, extraocular movements intact, anterior chambers clear bilaterally  Neck is supple. No adenopathy. Respirations nonlabored. Lungs clear to auscultation. No wheezes rales or rhonchi noted. Heart regular rate and rhythm. No murmur noted  Abdomen positive bowel sounds, no bruit. soft with mild tenderness throughout the periumbilical and lower abdomen without guarding or rigidity. No organomegaly or masses noted. No pulsatile masses noted. Skin free of any obvious rashes or lesions. Extremities without edema. No calf tenderness noted. Distal pulses and sensation intact. Good capillary refill noted. No acute neurologic deficit noted. Good gait and balance. Clear speech. Good affect. Pleasant patient. DIAGNOSTIC RESULTS     RADIOLOGY: (none if blank)   Interpretation per the Radiologistbelow, if available at the time of this note:    CT ABDOMEN PELVIS WO CONTRAST Additional Contrast? None   Preliminary Result   1. Large hiatal hernia containing portions of the pancreas, transverse colon   and multiple small bowel loops as well as much of the stomach. There is   interval development of some mild fluid-filled distention of small bowel   loops consistent with at least a partial or early obstruction. Close   follow-up recommended. 2. Small right pleural effusion. Dependent bilateral lower lobe atelectasis. 3. Small quantity of pelvic ascites. 4. Pericholecystic inflammatory changes. No definite calculi or biliary   dilatation. Ultrasound follow-up recommended. 5. Colonic diverticulosis with no acute features. 6. Degenerated calcified uterine fibroids.              LABS:  Labs Reviewed patient is very clear that she does not want any surgical intervention. I asked the patient if her obstruction were to worsen or she develop a surgical problem in her abdomen that required surgery and her choice was either surgery or that being the end of her life and she states clearly that she would not want surgery done and if it was her time it was time for the end. The patient requests to be discharged home family member state they will stay with the patient and that they agree to return should she begin vomiting develop fever or worsening pain. We will prescribe a small amount of pain medication for the patient to use and a very limited fashion Norco tablet as needed for pain every 8 hours but I explained that it was the patient to block her pain completely because I want her to understand significant worsen she should be reevaluated. The case is discussed with the attending emergency physician Dr. Quita Church    Strict returnprecautions and follow up instructions were discussed with the patient with which the patient agrees    ED Medications administered this visit:    Medications   morphine injection 2 mg (2 mg Intramuscular Given 3/29/19 1433)   ondansetron (ZOFRAN-ODT) disintegrating tablet 4 mg (4 mg Oral Given 3/29/19 1433)       CONSULTS: (None if blank)  None    Procedures: (None if blank)       CLINICAL       1. Partial small bowel obstruction [facility])          DISPOSITION/PLAN   DISPOSITION        PATIENT REFERRED TO:  José Antonio Hoskins MD  [facility]  [address]  [facility] [id number] [phone]    In 3 days        DISCHARGE MEDICATIONS:  New Prescriptions    HYDROCODONE-ACETAMINOPHEN (NORCO) 5-325 MG PER TABLET    Take 0.5 tablets by mouth every 8 hours as needed for Pain for up to 3 days. Intended supply: 3 days. Take lowest dose possible to manage pain     Attestation: The Prescription Monitoring Report for this patient was reviewed today.  Janine Laurent II, PA-C)        (Please note that

## 2019-03-29 NOTE — ED NOTES
Per pt's son---sister is an RN and they switched to lactose free milk and yesterday was the first that she had the lactose free milk     Isabel Cordova RN  03/29/19 0898

## 2019-04-01 ENCOUNTER — HOSPITAL ENCOUNTER (INPATIENT)
Age: 84
LOS: 3 days | Discharge: HOME | DRG: 391 | End: 2019-04-04
Attending: FAMILY MEDICINE | Admitting: FAMILY MEDICINE
Payer: MEDICARE

## 2019-04-01 ENCOUNTER — TELEPHONE (OUTPATIENT)
Dept: OTHER | Facility: CLINIC | Age: 84
End: 2019-04-01

## 2019-04-01 ENCOUNTER — APPOINTMENT (OUTPATIENT)
Dept: GENERAL RADIOLOGY | Age: 84
DRG: 391 | End: 2019-04-01
Attending: FAMILY MEDICINE
Payer: MEDICARE

## 2019-04-01 ENCOUNTER — DIRECT ADMIT ORDERS (OUTPATIENT)
Dept: FAMILY MEDICINE CLINIC | Age: 84
End: 2019-04-01

## 2019-04-01 ENCOUNTER — OFFICE VISIT (OUTPATIENT)
Dept: FAMILY MEDICINE CLINIC | Age: 84
End: 2019-04-01

## 2019-04-01 VITALS — DIASTOLIC BLOOD PRESSURE: 58 MMHG | BODY MASS INDEX: 18.78 KG/M2 | WEIGHT: 106 LBS | SYSTOLIC BLOOD PRESSURE: 112 MMHG

## 2019-04-01 DIAGNOSIS — E86.0 DEHYDRATION: Primary | ICD-10-CM

## 2019-04-01 DIAGNOSIS — R10.9 ABDOMINAL PAIN, UNSPECIFIED ABDOMINAL LOCATION: ICD-10-CM

## 2019-04-01 LAB
ALBUMIN SERPL-MCNC: 4.2 G/DL (ref 3.5–5.2)
ALBUMIN/GLOBULIN RATIO: 1.2 (ref 1–2.5)
ALP BLD-CCNC: 105 U/L (ref 35–104)
ALT SERPL-CCNC: 13 U/L (ref 5–33)
ANION GAP SERPL CALCULATED.3IONS-SCNC: 21 MMOL/L (ref 9–17)
AST SERPL-CCNC: 21 U/L
BILIRUB SERPL-MCNC: 0.76 MG/DL (ref 0.3–1.2)
BUN BLDV-MCNC: 35 MG/DL (ref 8–23)
BUN/CREAT BLD: 25 (ref 9–20)
CALCIUM SERPL-MCNC: 10 MG/DL (ref 8.6–10.4)
CHLORIDE BLD-SCNC: 94 MMOL/L (ref 98–107)
CO2: 22 MMOL/L (ref 20–31)
CREAT SERPL-MCNC: 1.42 MG/DL (ref 0.5–0.9)
GFR AFRICAN AMERICAN: 42 ML/MIN
GFR NON-AFRICAN AMERICAN: 35 ML/MIN
GFR SERPL CREATININE-BSD FRML MDRD: ABNORMAL ML/MIN/{1.73_M2}
GFR SERPL CREATININE-BSD FRML MDRD: ABNORMAL ML/MIN/{1.73_M2}
GLUCOSE BLD-MCNC: 72 MG/DL (ref 70–99)
HCT VFR BLD CALC: 37.1 % (ref 36.3–47.1)
HEMOGLOBIN: 12.2 G/DL (ref 11.9–15.1)
MCH RBC QN AUTO: 32.4 PG (ref 25.2–33.5)
MCHC RBC AUTO-ENTMCNC: 32.9 G/DL (ref 28.4–34.8)
MCV RBC AUTO: 98.7 FL (ref 82.6–102.9)
NRBC AUTOMATED: 0 PER 100 WBC
PDW BLD-RTO: 14.7 % (ref 11.8–14.4)
PLATELET # BLD: 254 K/UL (ref 138–453)
PMV BLD AUTO: 11.4 FL (ref 8.1–13.5)
POTASSIUM SERPL-SCNC: 4.4 MMOL/L (ref 3.7–5.3)
RBC # BLD: 3.76 M/UL (ref 3.95–5.11)
SODIUM BLD-SCNC: 137 MMOL/L (ref 135–144)
TOTAL PROTEIN: 7.6 G/DL (ref 6.4–8.3)
WBC # BLD: 6.9 K/UL (ref 3.5–11.3)

## 2019-04-01 PROCEDURE — 74022 RADEX COMPL AQT ABD SERIES: CPT

## 2019-04-01 PROCEDURE — 6370000000 HC RX 637 (ALT 250 FOR IP): Performed by: FAMILY MEDICINE

## 2019-04-01 PROCEDURE — 2580000003 HC RX 258: Performed by: FAMILY MEDICINE

## 2019-04-01 PROCEDURE — 85027 COMPLETE CBC AUTOMATED: CPT

## 2019-04-01 PROCEDURE — 6360000002 HC RX W HCPCS: Performed by: FAMILY MEDICINE

## 2019-04-01 PROCEDURE — 99223 1ST HOSP IP/OBS HIGH 75: CPT | Performed by: FAMILY MEDICINE

## 2019-04-01 PROCEDURE — 1200000000 HC SEMI PRIVATE

## 2019-04-01 PROCEDURE — 36415 COLL VENOUS BLD VENIPUNCTURE: CPT

## 2019-04-01 PROCEDURE — 80053 COMPREHEN METABOLIC PANEL: CPT

## 2019-04-01 PROCEDURE — 97162 PT EVAL MOD COMPLEX 30 MIN: CPT

## 2019-04-01 PROCEDURE — 99999 PR OFFICE/OUTPT VISIT,PROCEDURE ONLY: CPT | Performed by: FAMILY MEDICINE

## 2019-04-01 PROCEDURE — 93005 ELECTROCARDIOGRAM TRACING: CPT

## 2019-04-01 RX ORDER — SODIUM CHLORIDE 0.9 % (FLUSH) 0.9 %
10 SYRINGE (ML) INJECTION EVERY 12 HOURS SCHEDULED
Status: CANCELLED | OUTPATIENT
Start: 2019-04-01

## 2019-04-01 RX ORDER — PANTOPRAZOLE SODIUM 40 MG/1
40 TABLET, DELAYED RELEASE ORAL
Status: DISCONTINUED | OUTPATIENT
Start: 2019-04-02 | End: 2019-04-04 | Stop reason: HOSPADM

## 2019-04-01 RX ORDER — SODIUM CHLORIDE 0.9 % (FLUSH) 0.9 %
10 SYRINGE (ML) INJECTION PRN
Status: CANCELLED | OUTPATIENT
Start: 2019-04-01

## 2019-04-01 RX ORDER — SODIUM CHLORIDE 0.9 % (FLUSH) 0.9 %
10 SYRINGE (ML) INJECTION PRN
Status: DISCONTINUED | OUTPATIENT
Start: 2019-04-01 | End: 2019-04-04 | Stop reason: HOSPADM

## 2019-04-01 RX ORDER — LEVOTHYROXINE SODIUM 0.1 MG/1
100 TABLET ORAL DAILY
Status: DISCONTINUED | OUTPATIENT
Start: 2019-04-02 | End: 2019-04-04 | Stop reason: HOSPADM

## 2019-04-01 RX ORDER — SILDENAFIL CITRATE 20 MG/1
20 TABLET ORAL 3 TIMES DAILY
Status: DISCONTINUED | OUTPATIENT
Start: 2019-04-01 | End: 2019-04-04 | Stop reason: HOSPADM

## 2019-04-01 RX ORDER — FAMOTIDINE 20 MG/1
20 TABLET, FILM COATED ORAL DAILY
Status: DISCONTINUED | OUTPATIENT
Start: 2019-04-01 | End: 2019-04-04 | Stop reason: HOSPADM

## 2019-04-01 RX ORDER — ONDANSETRON 2 MG/ML
4 INJECTION INTRAMUSCULAR; INTRAVENOUS EVERY 6 HOURS PRN
Status: CANCELLED | OUTPATIENT
Start: 2019-04-01

## 2019-04-01 RX ORDER — FAMOTIDINE 20 MG/1
20 TABLET, FILM COATED ORAL 2 TIMES DAILY
Status: CANCELLED | OUTPATIENT
Start: 2019-04-01

## 2019-04-01 RX ORDER — ONDANSETRON 2 MG/ML
4 INJECTION INTRAMUSCULAR; INTRAVENOUS EVERY 6 HOURS PRN
Status: DISCONTINUED | OUTPATIENT
Start: 2019-04-01 | End: 2019-04-04 | Stop reason: HOSPADM

## 2019-04-01 RX ORDER — SODIUM CHLORIDE 9 MG/ML
INJECTION, SOLUTION INTRAVENOUS CONTINUOUS
Status: DISCONTINUED | OUTPATIENT
Start: 2019-04-01 | End: 2019-04-04 | Stop reason: HOSPADM

## 2019-04-01 RX ORDER — MORPHINE SULFATE 2 MG/ML
1 INJECTION, SOLUTION INTRAMUSCULAR; INTRAVENOUS
Status: DISCONTINUED | OUTPATIENT
Start: 2019-04-01 | End: 2019-04-04 | Stop reason: HOSPADM

## 2019-04-01 RX ORDER — SODIUM CHLORIDE 0.9 % (FLUSH) 0.9 %
10 SYRINGE (ML) INJECTION EVERY 12 HOURS SCHEDULED
Status: DISCONTINUED | OUTPATIENT
Start: 2019-04-01 | End: 2019-04-04 | Stop reason: HOSPADM

## 2019-04-01 RX ORDER — ACETAMINOPHEN 325 MG/1
650 TABLET ORAL EVERY 4 HOURS PRN
Status: DISCONTINUED | OUTPATIENT
Start: 2019-04-01 | End: 2019-04-04 | Stop reason: HOSPADM

## 2019-04-01 RX ORDER — CARVEDILOL 6.25 MG/1
6.25 TABLET ORAL 2 TIMES DAILY WITH MEALS
Status: DISCONTINUED | OUTPATIENT
Start: 2019-04-01 | End: 2019-04-04 | Stop reason: HOSPADM

## 2019-04-01 RX ORDER — ROPINIROLE 0.25 MG/1
0.5 TABLET, FILM COATED ORAL NIGHTLY
Status: DISCONTINUED | OUTPATIENT
Start: 2019-04-01 | End: 2019-04-04 | Stop reason: HOSPADM

## 2019-04-01 RX ORDER — ATORVASTATIN CALCIUM 40 MG/1
40 TABLET, FILM COATED ORAL DAILY
Status: DISCONTINUED | OUTPATIENT
Start: 2019-04-01 | End: 2019-04-04 | Stop reason: HOSPADM

## 2019-04-01 RX ORDER — SODIUM CHLORIDE 9 MG/ML
INJECTION, SOLUTION INTRAVENOUS CONTINUOUS
Status: CANCELLED | OUTPATIENT
Start: 2019-04-01

## 2019-04-01 RX ORDER — HYDROCODONE BITARTRATE AND ACETAMINOPHEN 5; 325 MG/1; MG/1
0.5 TABLET ORAL EVERY 8 HOURS PRN
Status: DISCONTINUED | OUTPATIENT
Start: 2019-04-01 | End: 2019-04-04 | Stop reason: HOSPADM

## 2019-04-01 RX ORDER — ACETAMINOPHEN 325 MG/1
650 TABLET ORAL EVERY 4 HOURS PRN
Status: CANCELLED | OUTPATIENT
Start: 2019-04-01

## 2019-04-01 RX ORDER — MORPHINE SULFATE 2 MG/ML
2 INJECTION, SOLUTION INTRAMUSCULAR; INTRAVENOUS
Status: DISCONTINUED | OUTPATIENT
Start: 2019-04-01 | End: 2019-04-04 | Stop reason: HOSPADM

## 2019-04-01 RX ADMIN — ONDANSETRON 4 MG: 2 INJECTION INTRAMUSCULAR; INTRAVENOUS at 17:29

## 2019-04-01 RX ADMIN — APIXABAN 2.5 MG: 2.5 TABLET, FILM COATED ORAL at 20:27

## 2019-04-01 RX ADMIN — ROPINIROLE HYDROCHLORIDE 0.5 MG: 0.25 TABLET, FILM COATED ORAL at 20:27

## 2019-04-01 RX ADMIN — CARVEDILOL 6.25 MG: 6.25 TABLET, FILM COATED ORAL at 19:27

## 2019-04-01 RX ADMIN — CEFAZOLIN 1 G: 1 INJECTION, POWDER, FOR SOLUTION INTRAMUSCULAR; INTRAVENOUS at 19:27

## 2019-04-01 RX ADMIN — ATORVASTATIN CALCIUM 40 MG: 40 TABLET, FILM COATED ORAL at 19:27

## 2019-04-01 RX ADMIN — FAMOTIDINE 20 MG: 20 TABLET ORAL at 15:35

## 2019-04-01 RX ADMIN — SODIUM CHLORIDE: 9 INJECTION, SOLUTION INTRAVENOUS at 15:14

## 2019-04-01 RX ADMIN — MORPHINE SULFATE 1 MG: 2 INJECTION, SOLUTION INTRAMUSCULAR; INTRAVENOUS at 17:31

## 2019-04-01 ASSESSMENT — PAIN SCALES - GENERAL
PAINLEVEL_OUTOF10: 6
PAINLEVEL_OUTOF10: 2
PAINLEVEL_OUTOF10: 0
PAINLEVEL_OUTOF10: 1

## 2019-04-01 ASSESSMENT — PAIN DESCRIPTION - FREQUENCY: FREQUENCY: INTERMITTENT

## 2019-04-01 ASSESSMENT — PAIN DESCRIPTION - PROGRESSION: CLINICAL_PROGRESSION: GRADUALLY IMPROVING

## 2019-04-01 ASSESSMENT — PAIN DESCRIPTION - DESCRIPTORS: DESCRIPTORS: ACHING

## 2019-04-01 ASSESSMENT — PAIN DESCRIPTION - ONSET: ONSET: GRADUAL

## 2019-04-01 ASSESSMENT — PAIN DESCRIPTION - PAIN TYPE: TYPE: ACUTE PAIN

## 2019-04-01 ASSESSMENT — PAIN DESCRIPTION - LOCATION: LOCATION: ABDOMEN

## 2019-04-01 ASSESSMENT — PAIN - FUNCTIONAL ASSESSMENT: PAIN_FUNCTIONAL_ASSESSMENT: PREVENTS OR INTERFERES SOME ACTIVE ACTIVITIES AND ADLS

## 2019-04-01 NOTE — TELEPHONE ENCOUNTER
Writer contacted Dr. Figueroa Hu office to schedule ED f/u appt. Spoke with Марина, appt scheduled for Wednesday 4-3 @1:45pm with Dr. Figueroa Hu.

## 2019-04-01 NOTE — H&P
Hospital Medicine  History and Physical    Patient:  Vincent Postal  MRN: [id number]    Chief Complaint:  Abdominal pain and nausea    History Obtained From:  patient, family member - son  PCP: Fabiola Hernandez MD    History of Present Illness: The patient is a 80 y.o. female who presents with 4-5 days of lower abdominal pain which is severe and associated with nausea  Low grade fever  No bloody stool and no hematemesis  She went to ER 3 days ago and had CT abdomen showing chronic large hiatal hernia  An IV cou,d not be started and she was sent home with pain meds and seemed to do ok for past 2 days but pain became more severe  It is mostly suprapubic  [facility] related to that  Urine was clear at that time  In the office she was dehydrated and in severe distress and I elected to admit  Her for hydration and limited treatment  She has requested no heroic measures  I did not feel however at this time she had a terminal condition    Past Medical History:        Diagnosis Date    Abnormal echocardiogram 5/15/12    EF >55%. mild diastolic dysfunction. RVSP 70 mmHg on echocardiogram. the right ventricle is moderately to severely enlarged with preserved function. Severe tricuspid regurgitation    Abnormal resting ECG findings 6/19/12    sinus bradycardia 55 beats per minute. Incomplete right bundle branch block. Evidence of left atrial enlargement. Abnormal ECG a    Allergic rhinitis 4/17/2017    Anxiety     ASHD (arteriosclerotic heart disease) 8/1997    CA 50-60%    ASHD (arteriosclerotic heart disease) 08/97    CA 13-61%    Diastolic congestive heart failure (HCC)     Elevated liver enzymes 1996    GERD (gastroesophageal reflux disease)     H/O echocardiogram 10/13/15    EF:60%. Mild mitral regurgitation. Moderate to severe tricuspid reguritation. Moderately dilated right atrium and right ventricle systolic pressure of 57 mmHg.  Evidence of mild (Grade 1) diastolic dysfunction.  H/O echocardiogram 05/26/2017    Estimated EF 55%. The right atrium appears severly dialted. Right ventricular dilatation. Moderate pulmonary HTN with an estimated right ventricular systolic pressure of [id number]. Moderate to severe tricuspid regurg. Evidence of mild diastolic dysfunction is seen. Compared to the previous study of 10/13/15 the pt estimated right sided pressure has further declined from 57mmHg to 45mmHg.  H/O echocardiogram 05/2018    EF 60% Mild Mitral regurg Severly dilated right atrium and right ventricle with mildly reduced right ventricular systolic function. Severe tricuspid regurgitation. Moderate pulmonary hypertension with an estimated right ventricular systolic pressure of [id number] Evidence of moderate diastolic dysfunction is seen    Hiatal hernia     History of echocardiogram 9/27/14    Compared to the previous study of 4/18/13, the patients estimated RVSP has decreased from >75 mmHg to 35mmHg    History of stress test 10/26/15    Probably normal. EF >70%. Significant electrocardiographic  evidence of MI during EKG monitoring without significant associated arrhythmias. Max ST depression was 1.4 mm in lead ll. Low risk for significant CAD.  Hyperglycemia 2008    Hyperlipidemia 1992    Hypertension 1991    Hyperthyroidism     Kidney stone     Menopause age 36    Osteoarthritis     lumbar    PAH (pulmonary artery hypertension) ([facility] [address] 7/5/2012    RHC: Mean PA 26, PCWP 12. Echo 6/12: RVSP 70    Pulmonary hypertension (HCC) 5/15/12    RVSP 70 mmHg on echocardiogram. the right ventricle is moderately to severely enlarged with preserved function. Severe tricuspid regurgitation.     Status post right heart catheterization 7/5/2012    RHC: Mean PA 26, PCWP 12.    Tricuspid regurgitation     MR 4/2002 ATB proph    Unspecified hypothyroidism 2/12/2015       Past Surgical History:        Procedure Laterality Date    CARDIAC CATHETERIZATION Left 8/1997     [address] CATARACT REMOVAL  10/2015    Dr. Evaristo Tang Bilateral 10/2014    Cataracts Surgery       Medications Prior to Admission:    Prior to Admission medications    Medication Sig Start Date End Date Taking? Authorizing Provider   HYDROcodone-acetaminophen (NORCO) 5-325 MG per tablet Take 0.5 tablets by mouth every 8 hours as needed for Pain for up to 3 days. Intended supply: 3 days. Take lowest dose possible to manage pain 3/29/19 4/1/19 Yes Tyron Laurent II, PA-C   carvedilol (COREG) 6.25 MG tablet Take 1 tablet by mouth 2 times daily (with meals) 3/10/19  Yes Adenike Churchill MD   amLODIPine (NORVASC) 2.5 MG tablet Take 1 tablet by mouth daily 3/11/19  Yes Adenike Churchill MD   sildenafil (REVATIO) 20 MG tablet sildenafil (antihypertensive) 20 mg tablet  Take 1 tablet 2  times a day by oral route for 90 days. Yes Historical Provider, MD   Misc.  Devices (STEP N REST WALKER) MISC 1 Device by Does not apply route continuous 2/25/19  Yes Adenike Churchill MD   apixaban (ELIQUIS) 2.5 MG TABS tablet Take 1 tablet by mouth 2 times daily 2/14/19  Yes Carey Patterson MD   omeprazole (PRILOSEC) 40 MG delayed release capsule TAKE ONE CAPSULE BY MOUTH DAILY 12/12/18  Yes Adenike Churchill MD   levothyroxine (SYNTHROID) 100 MCG tablet TAKE ONE TABLET BY MOUTH DAILY 10/30/18  Yes Adenike Churchill MD   spironolactone (ALDACTONE) 25 MG tablet Take 1 tablet by mouth daily 10/2/18  Yes Adenike Churchill MD   acetaminophen (TYLENOL) 500 MG tablet Take 500 mg by mouth every 6 hours as needed for Pain or Fever   Yes Historical Provider, MD   furosemide (LASIX) 20 MG tablet TAKE 1 TABLET DAILY IF THERE IS A 1 LB WEIGHT GAIN THEN TAKE 2 TABLETS ON THOSE DAYS  Patient taking differently: Take 20 mg by mouth daily TAKE 1 TABLET DAILY IF THERE IS A 1 LB WEIGHT GAIN THEN TAKE 2 TABLETS ON THOSE DAYS 8/13/18  Yes Adenike Churchill MD   atorvastatin (LIPITOR) 40 MG tablet TAKE ONE TABLET BY MOUTH DAILY 11/17/17  Yes Adenike Churchill MD   rOPINIRole oriented x 3. moderate acute distress. Well developed / well nourished. EYES:  EOMI, pupils equal   ENT:  Neck supple. No lymphadenopathy. No carotid bruit  CVS:    irreg  3/6 systolic m left sternal border  PULM: CTA, no wheezes, rales or rhonchi  ABD:    Bowels sounds normal.  Abdomen is sot  There is tendernes at the lower suprabpubic area nad left lower quadarant  EXT:   Trace and no edema bilaterally . No calf tenderness. NEURO: Motor and sensory are intact  SKIN:  No rashes. No skin lesions. PSYCH:  Normal mood and affect  -----------------------------------------------------------------  Diagnostic Data:   · All diagnostic data was reviewed    Assessment:         Principal Problem:    Acute diverticulitis  Active Problems:    Chronic disease anemia    Dehydration  Resolved Problems:    * No resolved hospital problems.  *      Plan:     · This patient requires inpatient admission because of dehydraton and diverticulitis  · Factors affecting the medical complexity of this patient include advanced age and chronic cardiac condition including CHF and pulmnary HTN  · Estimated length of stay is 3 days  · No surgical consult at this time needed    Fred Heller MD

## 2019-04-01 NOTE — PROGRESS NOTES
I, SOFYA Bryant, am scribing for and in the presence of Dr. Alfred Morocho. 04/01/2019/APOORVA/2pemiliana    [address] [address]  [id number]  Dept: [phone]    Ny Curtis is a 80 y.o. female here for Follow-Up from Hospital      HPI:   Tray Muniz went to the ER on 3/29/19 for abdominal pain. ER was not able to get an IV started after almost 10 tries. Her stomach and small bowl, pancreas were all coming through the hernia. She is on a pain medication Norco) which at first it helped to relax her and make the pain go away some. She then took tylenol with the norco and she states that helped with the pain. She has been on a liquid diet. CT ABDOMEN PELVIS WO CONTRAST Additional Contrast? None   Preliminary Result   1. Large hiatal hernia containing portions of the pancreas, transverse colon   and multiple small bowel loops as well as much of the stomach. There is   interval development of some mild fluid-filled distention of small bowel   loops consistent with at least a partial or early obstruction. Close   follow-up recommended. 2. Small right pleural effusion. Dependent bilateral lower lobe atelectasis. 3. Small quantity of pelvic ascites. 4. Pericholecystic inflammatory changes. No definite calculi or biliary   dilatation. Ultrasound follow-up recommended. 5. Colonic diverticulosis with no acute features. 6. Degenerated calcified uterine fibroids.         Accomanied by dtr in [facility]  Prior to Admission medications    Medication Sig Start Date End Date Taking? Authorizing Provider   carvedilol (COREG) 6.25 MG tablet Take 1 tablet by mouth 2 times daily (with meals) 3/10/19  Yes Alfred Morocho MD   amLODIPine (NORVASC) 2.5 MG tablet Take 1 tablet by mouth daily 3/11/19  Yes Alfred Morocho MD   sildenafil (REVATIO) 20 MG tablet sildenafil (antihypertensive) 20 mg tablet  Take 1 tablet 2  times a day by oral route for 90 days.    Yes Historical Provider, MD   [facility] [place]. Devices (STEP N REST WALKER) MISC 1 Device by Does not apply route continuous 2/25/19  Yes Zeinab Ryees MD   apixaban (ELIQUIS) 2.5 MG TABS tablet Take 1 tablet by mouth 2 times daily 2/14/19  Yes Laura Sal MD   omeprazole (PRILOSEC) 40 MG delayed release capsule TAKE ONE CAPSULE BY MOUTH DAILY 12/12/18  Yes Zeinab Reyes MD   levothyroxine (SYNTHROID) 100 MCG tablet TAKE ONE TABLET BY MOUTH DAILY 10/30/18  Yes Zeinab Reyes MD   spironolactone (ALDACTONE) 25 MG tablet Take 1 tablet by mouth daily 10/2/18  Yes Zeinab Reyes MD   acetaminophen (TYLENOL) 500 MG tablet Take 500 mg by mouth every 6 hours as needed for Pain or Fever   Yes Historical Provider, MD   furosemide (LASIX) 20 MG tablet TAKE 1 TABLET DAILY IF THERE IS A 1 LB WEIGHT GAIN THEN TAKE 2 TABLETS ON THOSE DAYS  Patient taking differently: Take 20 mg by mouth daily TAKE 1 TABLET DAILY IF THERE IS A 1 LB WEIGHT GAIN THEN TAKE 2 TABLETS ON THOSE DAYS 8/13/18  Yes Zeinab Reyes MD   atorvastatin (LIPITOR) 40 MG tablet TAKE ONE TABLET BY MOUTH DAILY 11/17/17  Yes Zeinab Reyes MD   rOPINIRole (REQUIP) 0.5 MG tablet TAKE 1 TABLET BY MOUTH NIGHTLY 8/8/16  Yes Zeinab Reyes MD   ferrous sulfate 325 (65 FE) MG tablet Take 1 tablet by mouth 2 times daily (with meals)  Patient taking differently: Take 325 mg by mouth 2 times daily  11/4/15  Yes Zeinab Reyes MD   calcium carbonate (TUMS) 500 MG chewable tablet Take 1 tablet by mouth daily    Yes Historical Provider, MD       ROS:  General Constitutional: Denies chills. Denies fever. Denies headache. Denies lightheadedness. Cardiovascular: Denies chest pain at rest. Denies irregular heartbeat. Denies palpitations. Gastrointestinal: Admits abdominal pain. Denies blood in the stool. Admits constipation. Denies diarrhea. Denies nausea. Denies vomiting. Genitourinary: Denies blood in the urine. Denies difficulty urinating. Admits frequent urination.  Denies painful urination. Denies urinary incontinence. Past Surgical History:   Procedure Laterality Date    CARDIAC CATHETERIZATION Left 8/1997    Dr. Lyndon Amador  10/2015    Dr. Sara Vick Bilateral 10/2014    Cataracts Surgery       Family History   Problem Relation Age of Onset    High Blood Pressure Mother     Heart Failure Mother     Other Mother         DJD    Stroke Father     Heart Disease Sister     High Blood Pressure Sister     High Cholesterol Sister     Cancer Sister         metastatic endometrial CA, Cause of death    Other Brother         CAD       Past Medical History:   Diagnosis Date    Abnormal echocardiogram 5/15/12    EF >55%. mild diastolic dysfunction. RVSP 70 mmHg on echocardiogram. the right ventricle is moderately to severely enlarged with preserved function. Severe tricuspid regurgitation    Abnormal resting ECG findings 6/19/12    sinus bradycardia 55 beats per minute. Incomplete right bundle branch block. Evidence of left atrial enlargement. Abnormal ECG a    Allergic rhinitis 4/17/2017    Anxiety     ASHD (arteriosclerotic heart disease) 8/1997    CA 50-60%    ASHD (arteriosclerotic heart disease) 08/97    CA 93-41%    Diastolic congestive heart failure (HCC)     Elevated liver enzymes 1996    GERD (gastroesophageal reflux disease)     H/O echocardiogram 10/13/15    EF:60%. Mild mitral regurgitation. Moderate to severe tricuspid reguritation. Moderately dilated right atrium and right ventricle systolic pressure of 57 mmHg. Evidence of mild (Grade 1) diastolic dysfunction.  H/O echocardiogram 05/26/2017    Estimated EF 55%. The right atrium appears severly dialted. Right ventricular dilatation. Moderate pulmonary HTN with an estimated right ventricular systolic pressure of [id number]. Moderate to severe tricuspid regurg. Evidence of mild diastolic dysfunction is seen.  Compared to the previous study of 10/13/15 the pt estimated right sided pressure has further declined from 57mmHg to 45mmHg.  H/O echocardiogram 05/2018    EF 60% Mild Mitral regurg Severly dilated right atrium and right ventricle with mildly reduced right ventricular systolic function. Severe tricuspid regurgitation. Moderate pulmonary hypertension with an estimated right ventricular systolic pressure of [id number] Evidence of moderate diastolic dysfunction is seen    Hiatal hernia     History of echocardiogram 9/27/14    Compared to the previous study of 4/18/13, the patients estimated RVSP has decreased from >75 mmHg to 35mmHg    History of stress test 10/26/15    Probably normal. EF >70%. Significant electrocardiographic  evidence of MI during EKG monitoring without significant associated arrhythmias. Max ST depression was 1.4 mm in lead ll. Low risk for significant CAD.  Hyperglycemia 2008    Hyperlipidemia 1992    Hypertension 1991    Hyperthyroidism     Kidney stone     Menopause age 36    Osteoarthritis     lumbar    PAH (pulmonary artery hypertension) ([address] 7/5/2012    RHC: Mean PA 26, PCWP 12. Echo 6/12: RVSP 70    Pulmonary hypertension (HCC) 5/15/12    RVSP 70 mmHg on echocardiogram. the right ventricle is moderately to severely enlarged with preserved function. Severe tricuspid regurgitation.  Status post right heart catheterization 7/5/2012    RHC: Mean PA 26, PCWP 12.    Tricuspid regurgitation     MR 4/2002 ATB proph    Unspecified hypothyroidism 2/12/2015      Social History     Tobacco Use    Smoking status: Never Smoker    Smokeless tobacco: Never Used   Substance Use Topics    Alcohol use: No      No current facility-administered medications for this visit. No current outpatient medications on file.      Facility-Administered Medications Ordered in Other Visits   Medication Dose Route Frequency Provider Last Rate Last Dose    polyethylene glycol (GLYCOLAX) packet 17 g  17 g Oral Daily Ijeoma Cotton MD   17 g at 04/02/19 3725    0.9 % sodium chloride infusion   Intravenous Continuous Keli Aguilar MD 75 mL/hr at 04/02/19 0336      acetaminophen (TYLENOL) tablet 650 mg  650 mg Oral Q4H PRN Keli Aguilar MD        famotidine (PEPCID) tablet 20 mg  20 mg Oral Daily Keli Aguilar MD   20 mg at 04/02/19 0830    magnesium hydroxide (MILK OF MAGNESIA) 400 MG/5ML suspension 30 mL  30 mL Oral Daily PRN Keli Aguilar MD        ondansetron [facility]) injection 4 mg  4 mg Intravenous Q6H PRN Keli Aguilar MD   4 mg at 04/01/19 1729    sodium chloride flush 0.9 % injection 10 mL  10 mL Intravenous 2 times per day Keli Aguilar MD        sodium chloride flush 0.9 % injection 10 mL  10 mL Intravenous PRN Keli Aguilar MD        morphine (PF) injection 1 mg  1 mg Intravenous Q2H PRN Keli Aguilar MD   1 mg at 04/01/19 1731    Or    morphine (PF) injection 2 mg  2 mg Intravenous Q2H PRN Keli Aguilar MD        ceFAZolin (ANCEF) 1 g in dextrose 5 % 50 mL IVPB (mini-bag)  1 g Intravenous Q8H Keli Aguilar MD   Stopped at 04/02/19 0406    apixaban (ELIQUIS) tablet 2.5 mg  2.5 mg Oral BID Keli Aguilar MD   2.5 mg at 04/02/19 0830    atorvastatin (LIPITOR) tablet 40 mg  40 mg Oral Daily Keli Aguilar MD   40 mg at 04/02/19 0830    carvedilol (COREG) tablet 6.25 mg  6.25 mg Oral BID  Keli Aguilar MD   6.25 mg at 04/02/19 0654    HYDROcodone-acetaminophen ([address]) 5-325 MG per tablet 0.5 tablet  0.5 tablet Oral Q8H PRN Keli Aguilar MD   0.5 tablet at 04/02/19 0829    levothyroxine (SYNTHROID) tablet 100 mcg  100 mcg Oral Daily Keli Aguilar MD   100 mcg at 04/02/19 0830    rOPINIRole (REQUIP) tablet 0.5 mg  0.5 mg Oral Nightly Keli Aguilar MD   0.5 mg at 04/01/19 2027    sildenafil (REVATIO) tablet 20 mg  20 mg Oral TID Keli Aguilar MD        pantoprazole (PROTONIX) tablet 40 mg  40 mg Oral QAM AC Keli Aguilar MD   40 mg at 04/02/19 0654     No Known Allergies    PHYSICAL EXAM:    BP (!) 112/58 (Site: Left Upper Arm, Position: Sitting, Cuff Size: Medium Adult)   Wt 106 lb (48.1 kg)   BMI 18.78 kg/m²   Wt Readings from Last 3 Encounters:   04/02/19 106 lb 6.4 oz (48.3 kg)   04/01/19 106 lb (48.1 kg)   03/29/19 153 lb (69.4 kg)     BP Readings from Last 3 Encounters:   04/02/19 (!) 122/50   04/01/19 (!) 112/58   03/29/19 133/74       General Appearance: in no acute distress, well developed, well nourished. Eyes: pupils equal, round reactive to light and accommodation. Ears: normal canal and TM's. Nose: nares patent, no lesions. Oral Cavity: mucosa moist. Tongue is pretty dry  Throat: clear. Neck/Thyroid: neck supple, full range of motion, no cervical lymphadenopathy, no thyromegaly or carotid bruits. Skin: warm and dry. No suspicious lesions. Heart: regular rate and rhythm. No murmurs. S1, S2 normal, no gallops. Lungs: clear to auscultation bilaterally. Abdomen: bowel sounds present, soft, nontender, nondistended, no masses or organomegaly. Tender lower pelvis, subra pubic area, no distention. Left lower quadrant as well. Musculoskeletal: normal, full range of motion in knees and hips, no swelling or tenderness. Extremities: no cyanosis or edema. Peripheral Pulses: 2+ throughout, symetric. Neurologic: nonfocal, motor strength normal upper and lower extremities, sensory exam intact. Psych: normal affect, speech fluent. ASSESSMENT:   Diagnosis Orders   1. Dehydration     2. Abdominal pain, unspecified abdominal location         PLAN:  I am not sure if the hernia is the cause of the amount of pain that she is in. Urine was fine. The hernia she has has been there for years. It might be her diverticulitis. She is not a fan of the hospital.  We can go about this several ways. I don't feel comfortable sending her home. She is dehydrated, I think she will feel better if she gets fluids. She is agreeable to whatever she would like me to do. I am going to admit her and see what is causing the pain.    No orders of the defined types were placed in this encounter. No orders of the defined types were placed in this encounter. I, Dr. Radha Dennison, personally performed the services described in this documentation as scribed by SOFYA Stoner in my presence, and is both accurate and complete.

## 2019-04-01 NOTE — PATIENT INSTRUCTIONS
PLAN:  I am not sure if the hernia is the cause of the amount of pain that she is in. Urine was fine. The hernia she has has been there for years. It might be her diverticulitis. She is not a fan of the hospital.  We can go about this several ways. I don't feel comfortable sending her home. She is dehydrated, I think she will feel better if she gets fluids. She is agreeable to whatever she would like me to do. I am going to admit her and see what is causing the pain. SURVEY:    You may be receiving a survey from [employer] regarding your visit today. Please complete the survey to enable us to provide the highest quality of care to you and your family. If you cannot score us a very good on any question, please call the office to discuss how we could have made your experience a very good one. Thank you.

## 2019-04-02 PROBLEM — E43 SEVERE MALNUTRITION (HCC): Status: ACTIVE | Noted: 2019-04-02

## 2019-04-02 LAB
-: ABNORMAL
AMORPHOUS: ABNORMAL
BACTERIA: ABNORMAL
BILIRUBIN URINE: NEGATIVE
CASTS UA: ABNORMAL /LPF
COLOR: YELLOW
COMMENT UA: ABNORMAL
CRYSTALS, UA: ABNORMAL /HPF
EKG ATRIAL RATE: 394 BPM
EKG Q-T INTERVAL: 430 MS
EKG QRS DURATION: 96 MS
EKG QTC CALCULATION (BAZETT): 407 MS
EKG R AXIS: 95 DEGREES
EKG T AXIS: 50 DEGREES
EKG VENTRICULAR RATE: 54 BPM
EPITHELIAL CELLS UA: ABNORMAL /HPF (ref 0–25)
GLUCOSE URINE: NEGATIVE
KETONES, URINE: ABNORMAL
LEUKOCYTE ESTERASE, URINE: NEGATIVE
MUCUS: ABNORMAL
NITRITE, URINE: NEGATIVE
OTHER OBSERVATIONS UA: ABNORMAL
PH UA: 5.5 (ref 5–9)
PROTEIN UA: NEGATIVE
RBC UA: ABNORMAL /HPF (ref 0–2)
RENAL EPITHELIAL, UA: ABNORMAL /HPF
SPECIFIC GRAVITY UA: 1.02 (ref 1.01–1.02)
TRICHOMONAS: ABNORMAL
TURBIDITY: CLEAR
URINE HGB: NEGATIVE
UROBILINOGEN, URINE: NORMAL
WBC UA: ABNORMAL /HPF (ref 0–5)
YEAST: ABNORMAL

## 2019-04-02 PROCEDURE — 1200000000 HC SEMI PRIVATE

## 2019-04-02 PROCEDURE — 6370000000 HC RX 637 (ALT 250 FOR IP): Performed by: FAMILY MEDICINE

## 2019-04-02 PROCEDURE — 51798 US URINE CAPACITY MEASURE: CPT

## 2019-04-02 PROCEDURE — 97110 THERAPEUTIC EXERCISES: CPT

## 2019-04-02 PROCEDURE — 81001 URINALYSIS AUTO W/SCOPE: CPT

## 2019-04-02 PROCEDURE — 97116 GAIT TRAINING THERAPY: CPT

## 2019-04-02 PROCEDURE — 2580000003 HC RX 258: Performed by: FAMILY MEDICINE

## 2019-04-02 PROCEDURE — 6360000002 HC RX W HCPCS: Performed by: FAMILY MEDICINE

## 2019-04-02 PROCEDURE — 99232 SBSQ HOSP IP/OBS MODERATE 35: CPT | Performed by: FAMILY MEDICINE

## 2019-04-02 RX ORDER — POLYETHYLENE GLYCOL 3350 17 G/17G
17 POWDER, FOR SOLUTION ORAL DAILY
Status: DISCONTINUED | OUTPATIENT
Start: 2019-04-02 | End: 2019-04-04 | Stop reason: HOSPADM

## 2019-04-02 RX ADMIN — CEFAZOLIN 1 G: 1 INJECTION, POWDER, FOR SOLUTION INTRAMUSCULAR; INTRAVENOUS at 03:36

## 2019-04-02 RX ADMIN — POLYETHYLENE GLYCOL (3350) 17 G: 17 POWDER, FOR SOLUTION ORAL at 08:29

## 2019-04-02 RX ADMIN — FAMOTIDINE 20 MG: 20 TABLET ORAL at 08:30

## 2019-04-02 RX ADMIN — CEFAZOLIN 1 G: 1 INJECTION, POWDER, FOR SOLUTION INTRAMUSCULAR; INTRAVENOUS at 21:18

## 2019-04-02 RX ADMIN — MORPHINE SULFATE 1 MG: 2 INJECTION, SOLUTION INTRAMUSCULAR; INTRAVENOUS at 13:30

## 2019-04-02 RX ADMIN — ROPINIROLE HYDROCHLORIDE 0.5 MG: 0.25 TABLET, FILM COATED ORAL at 21:18

## 2019-04-02 RX ADMIN — CARVEDILOL 6.25 MG: 6.25 TABLET, FILM COATED ORAL at 18:39

## 2019-04-02 RX ADMIN — SODIUM CHLORIDE: 9 INJECTION, SOLUTION INTRAVENOUS at 03:36

## 2019-04-02 RX ADMIN — HYDROCODONE BITARTRATE AND ACETAMINOPHEN 0.5 TABLET: 5; 325 TABLET ORAL at 08:29

## 2019-04-02 RX ADMIN — PANTOPRAZOLE SODIUM 40 MG: 40 TABLET, DELAYED RELEASE ORAL at 06:54

## 2019-04-02 RX ADMIN — ONDANSETRON 4 MG: 2 INJECTION INTRAMUSCULAR; INTRAVENOUS at 23:07

## 2019-04-02 RX ADMIN — ATORVASTATIN CALCIUM 40 MG: 40 TABLET, FILM COATED ORAL at 08:30

## 2019-04-02 RX ADMIN — CEFAZOLIN 1 G: 1 INJECTION, POWDER, FOR SOLUTION INTRAMUSCULAR; INTRAVENOUS at 12:11

## 2019-04-02 RX ADMIN — LEVOTHYROXINE SODIUM 100 MCG: 100 TABLET ORAL at 08:30

## 2019-04-02 RX ADMIN — CARVEDILOL 6.25 MG: 6.25 TABLET, FILM COATED ORAL at 06:54

## 2019-04-02 RX ADMIN — ONDANSETRON 4 MG: 2 INJECTION INTRAMUSCULAR; INTRAVENOUS at 12:11

## 2019-04-02 RX ADMIN — APIXABAN 2.5 MG: 2.5 TABLET, FILM COATED ORAL at 21:18

## 2019-04-02 RX ADMIN — HYDROCODONE BITARTRATE AND ACETAMINOPHEN 0.5 TABLET: 5; 325 TABLET ORAL at 18:59

## 2019-04-02 RX ADMIN — SODIUM CHLORIDE: 9 INJECTION, SOLUTION INTRAVENOUS at 18:03

## 2019-04-02 RX ADMIN — APIXABAN 2.5 MG: 2.5 TABLET, FILM COATED ORAL at 08:30

## 2019-04-02 ASSESSMENT — PAIN SCALES - GENERAL
PAINLEVEL_OUTOF10: 3
PAINLEVEL_OUTOF10: 5
PAINLEVEL_OUTOF10: 0
PAINLEVEL_OUTOF10: 5
PAINLEVEL_OUTOF10: 2

## 2019-04-02 ASSESSMENT — PAIN - FUNCTIONAL ASSESSMENT: PAIN_FUNCTIONAL_ASSESSMENT: PREVENTS OR INTERFERES SOME ACTIVE ACTIVITIES AND ADLS

## 2019-04-02 ASSESSMENT — PAIN DESCRIPTION - LOCATION: LOCATION: ABDOMEN

## 2019-04-02 ASSESSMENT — PAIN DESCRIPTION - DESCRIPTORS: DESCRIPTORS: CRAMPING

## 2019-04-02 ASSESSMENT — PAIN DESCRIPTION - PAIN TYPE: TYPE: ACUTE PAIN

## 2019-04-02 ASSESSMENT — PAIN DESCRIPTION - ORIENTATION: ORIENTATION: LOWER

## 2019-04-02 ASSESSMENT — PAIN DESCRIPTION - FREQUENCY: FREQUENCY: INTERMITTENT

## 2019-04-02 NOTE — PROGRESS NOTES
Met with Patient and her sister this a.m. She is a 80year old , white female, admitted with Acute diverticulitis. Patient is alert and oriented, very pleasant and cooperative with this assessment. Good informal support noted with involvement of family and friends. Patient lives alone in [place] [address]. Has recently had home health services. Uses cane, walker, shower chair and hand rails in the bathroom for assistance. Patient utilizes [employer] for her transportation needs but also relies on her children to take her to appointments and to get groceries. Patient has 2 adult children, a daughter who lives near [place] and a son who lives locally, both are involved and supportive to Patient. PCP is Dr. Merline Guillen. Cost of her medications poses no financial barriers to Patient's being discharged, per Pt own report. Patient wishes to return home upon discharge. Discussed home health referral and Patient does not want this. She feels that she has good support and needs no additional services at this time. Patient is a DNR-CCA. Is noted to have Advanced Directives in place. No anticipated needs or concerns identified by Patient or her sister at this time. LSW to monitor and assist with discharge planning as appropriate.     Nancy. Luc [address]  4/2/2019

## 2019-04-02 NOTE — CONSULTS
[facility]              Inpatient Medication Education Note                                 Patient admitted to [facility] for acute diverticulitis. Medications reviewed with the patient include:  cefazolin (patient education monograph provided), famotidine, ondansetron, morphine, polyethylene glycol. Patient education provided when necessary to include potential medication related side effects with acknowledgement of understanding. Patient provided [facility] Medication Side Effects Pamphlet and Pharmacy Department business card for contact information. Chaim Albright.  Stephanie Wei, 4/2/2019, 3:44 PM

## 2019-04-02 NOTE — PROGRESS NOTES
Fair  Sitting - Static: Good  Sitting - Dynamic: Good;-  Standing - Static: Fair;+  Standing - Dynamic: Fair  Exercises  Straight Leg Raise: x10  Quad Sets: x15  Heelslides: x15  Gluteal Sets: x15  Hip Flexion: x15  Hip Abduction: x15  Knee Long Arc Quad: x15  Ankle Pumps: x15  Comments: BLE ther ex completed in seated and reclined. Assessment   Treatment Diagnosis: difficulty walking  Prognosis: Good  Patient Education: Educated pt on safety with AD and new exercises. REQUIRES PT FOLLOW UP: Yes  Activity Tolerance  Activity Tolerance: Patient Tolerated treatment well     G-Code     OutComes Score                                                  AM-PAC Score             Goals  Short term goals  Time Frame for Short term goals: 10 days  Short term goal 1: Initiate HEP and progress exercises for strengthening and mobility. Short term goal 2: Pt will be modified independent with bed mobility without use of rails. Short term goal 3: Pt will be modified independent with transfers with least restrictive device and good safety. Short term goal 4: Pt will  be modified independence with least restrictive device for all transfers with good balance to reduce risk for fall. Short term goal 5: Pt will be modified independent with ambulation for up to 150 ft to allow pat to discharge home safely. Patient Goals   Patient goals : 10 days    Plan    Plan  Times per week: 7  Times per day: Twice a day(twice daily with exception of weekends, daily)  Current Treatment Recommendations: Strengthening, Transfer Training, Endurance Training, Neuromuscular Re-education, Patient/Caregiver Education & Training, Balance Training, Gait Training, Home Exercise Program, Functional Mobility Training, Stair training, Safety Education & Training  Safety Devices  Type of devices:  All fall risk precautions in place, Patient at risk for falls, Call light within reach, Left in chair(no alarm upon entry )     Therapy Time Individual Concurrent Group Co-treatment   Time In 0858         Time Out 0922         Minutes [address] [place], [place]

## 2019-04-02 NOTE — PROGRESS NOTES
Facsimile Transmission Cover Sheet    Information contained in this transmission is for the sole use of the intended recipients and may contain confidential and privileged information. Any unauthorized review, use, disclosure or distribution is prohibited. If you are not the intended recipient, please contact the sender and destroy all copies of the original message. Disclosure is made for the purpose of healthcare operations and continuity of care. To: __Dr. Phani George ________________________    From: [facility]    Fax Number: [phone]    Sender:_Maureen Suarez RN________________ Phone Number:_[phone]____________      This request is: No    Information and/or questions regarding patient condition:    Regarding Jessy Her- Patient with increased nausea and poor appetite. Zofran has been ineffective. Do you want to try phenergan? THanks! Maureen Suarez, AYO                    Physician Signature, Date and Time needed for any orders written on this fax. Thank you.     Signature_____________________ Date __________ Time _______

## 2019-04-02 NOTE — PROGRESS NOTES
Physical Therapy    Facility/Department: [facility] MED SURG  Initial Assessment    NAME: Bertin Diaz  : 10/13/1927  MRN: [id number]    Date of Service: 2019    Discharge Recommendations:    Home with home health       Assessment   Body structures, Functions, Activity limitations: Decreased functional mobility ; Decreased safe awareness;Decreased balance;Decreased ADL status; Decreased endurance;Decreased strength  Assessment: Pt was referred for difficulty ambulating. Pt is currently CGA +1 with RW. Progress as tolerated. Treatment Diagnosis: difficulty walking  Prognosis: Good  Decision Making: Medium Complexity  Activity Tolerance  Activity Tolerance: Patient Tolerated treatment well       Patient Diagnosis(es): There were no encounter diagnoses. has a past medical history of Abnormal echocardiogram, Abnormal resting ECG findings, Allergic rhinitis, Anxiety, ASHD (arteriosclerotic heart disease), ASHD (arteriosclerotic heart disease), Diastolic congestive heart failure (HCC), Elevated liver enzymes, GERD (gastroesophageal reflux disease), H/O echocardiogram, H/O echocardiogram, H/O echocardiogram, Hiatal hernia, History of echocardiogram, History of stress test, Hyperglycemia, Hyperlipidemia, Hypertension, Hyperthyroidism, Kidney stone, Menopause, Osteoarthritis, PAH (pulmonary artery hypertension) ([address] Pulmonary hypertension ([address] Status post right heart catheterization, Tricuspid regurgitation, and Unspecified hypothyroidism. has a past surgical history that includes eye surgery (Bilateral, 10/2014); Cataract removal (10/2015); and Cardiac catheterization (Left, 1997).     Restrictions  Restrictions/Precautions  Restrictions/Precautions: General Precautions, Fall Risk  Vision/Hearing  Vision: Within Functional Limits  Hearing: Within functional limits     Subjective  General  Chart Reviewed: Yes  Follows Commands: Within Functional Limits  Subjective  Subjective: Pt reports she is feeling better and just wants to go back to her home  Pain Screening  Patient Currently in Pain: Yes          Orientation  Orientation  Overall Orientation Status: Within Functional Limits  Social/Functional History  Social/Functional History  Lives With: Alone  Type of Home: House  Home Layout: One level  Home Access: Stairs to enter with rails  Entrance Stairs - Number of Steps: 2  Entrance Stairs - Rails: Right  Ambulation Assistance: Independent  Transfer Assistance: Independent    Objective     Observation/Palpation  Posture: Fair  Observation: pt with slow yelena but tolerated ambulation well    AROM RLE (degrees)  RLE AROM: WFL  AROM LLE (degrees)  LLE AROM : WFL  Strength RLE  Strength RLE: WFL  Strength LLE  Strength LLE: WFL        Bed mobility  Supine to Sit: Stand by assistance  Scooting: Stand by assistance  Transfers  Sit to Stand: Contact guard assistance  Stand to sit: Contact guard assistance  Bed to Chair: Contact guard assistance  Stand Pivot Transfers: Contact guard assistance  Ambulation  Ambulation?: Yes  WB Status: unrestricted  Ambulation 1  Surface: level tile  Device: Rolling Walker  Assistance: Contact guard assistance  Quality of Gait: slow yelena, shuffling gait  Distance: 25 feet  Stairs/Curb  Stairs?: No     Balance  Posture: Fair  Sitting - Static: Good  Sitting - Dynamic: Good;-  Standing - Static: Fair;+  Standing - Dynamic: [address]  Times per week: 7  Times per day: Twice a day(twice daily with exception of weekends, daily)  Current Treatment Recommendations: Strengthening, Transfer Training, Endurance Training, Neuromuscular Re-education, Patient/Caregiver Education & Training, Balance Training, Gait Training, Home Exercise Program, Functional Mobility Training, Stair training, Safety Education & Training  Safety Devices  Type of devices:  All fall risk precautions in place, Patient at risk for falls, Call light within reach, Left in chair, Chair alarm in

## 2019-04-02 NOTE — PROGRESS NOTES
formerly [facility]  Inpatient/Observation/Outpatient Rehabilitation    Date: 2019  Patient Name: Juana Tobar       [x] Inpatient Acute/Observation       []  Outpatient  : 10/13/1927       [x] Pt refused/declined therapy at this time due to: Pt refused PM treat on 19 due to not feeling well. 2 attempts to see pt with pt in bed upon arrival on 1st attempt and reporting that she feels sick to her stomach and family reported nursing just gave her meds to address symptoms. On the 2nd attempt pt was up in chair upon arrival and stated \"I still don't feel well and don't feel like doing anything and I am going to be getting some soup.  Pt educated on importance and benefits of therapy with pt verbalizing appropriate understanding, however still not agreeable at this time, but was agreeable to try again in the AM.                 Jerryl Favre, PTA Date: 2019

## 2019-04-02 NOTE — PROGRESS NOTES
Nutrition Assessment    Type and Reason for Visit: Initial, Positive Nutrition Screen(MST 3: wt loss/decreased PO)    Nutrition Recommendations:   1. Modify diet to low fiber. 2. Add ensure enlive TID with meals. (chocolate)  3. Low and high fiber diet instruction provided. Nutrition Assessment: Severe malnutrition/Inadequate oral intakes R/T altered GI AEB presence of diverticulitis and need for low fiber diet. Pt with noted weight loss. Weight of 153# 3/29/19 believed to be in error. Pt has not been in the 150's per historical weights. #. 17#/13.9% weight loss x 3 months, severe rate of decline. PO is poor per flow sheet data. Pt with c/o abdominal pain. Recommended diet modification to low fiber. Pt with elevated renal indices. Pt c/o decreased PO x 1 month, < 50% of usual-gets full quickly. Has a hernia. Has not tried a supplement, has been thinking about it. Makes her own crock pot meals because her kids are worried she will leave the stove on. Agrees to try chocolate ensure enlive. Weighs herself every day due to fluid gain Hx. Does not add salt to foods. Provided low fiber and high fiber diet education materials. Malnutrition Assessment:  · Malnutrition Status: Meets the criteria for severe malnutrition  · Context: Acute illness or injury  · Findings of the 6 clinical characteristics of malnutrition (Minimum of 2 out of 6 clinical characteristics is required to make the diagnosis of moderate or severe Protein Calorie Malnutrition based on AND/ASPEN Guidelines):  1. Energy Intake-Less than or equal to 50% of estimated energy requirement, Greater than or equal to 1 month    2. Weight Loss-10% loss or greater, in 3 months  3. Fat Loss-Mild subcutaneous fat loss, Orbital, Fat overlying the ribs  4. Muscle Loss-Moderate muscle mass loss, Clavicles (pectoralis and deltoids)(mild interosseus, mild temples)  5. Fluid Accumulation-No significant fluid accumulation,    6.   Strength-Not measured    Nutrition Risk Level:  Moderate, High    Nutrient Needs:  · Estimated Daily Total Kcal: 0424-3855(28-21/RB)  · Estimated Daily Protein (g): 62-72g(1.3-1.5g/kg)  · Estimated Daily Total Fluid (ml/day): 1440 ml(30/kg)    Nutrition Diagnosis:   · Problem: Severe malnutrition, Inadequate oral intake  · Etiology: related to Alteration in GI function     Signs and symptoms:  as evidenced by Intake 0-25%, Moderate muscle loss, Mild loss of subcutaneous fat(c/o abdominal pain, diverticulitis)    Objective Information:  · Nutrition-Focused Physical Findings: Pt with evident malnutrition indicies  · Wound Type: None  · Current Nutrition Therapies:  · Oral Diet Orders: General   · Oral Diet intake: 1-25%  · Oral Nutrition Supplement (ONS) Orders: None  · Anthropometric Measures:  · Ht: 5' 3\" (160 cm)   · Current Body Wt: 106 lb 6.4 oz (48.3 kg)  · Admission Body Wt: 104 lb 11.2 oz (47.5 kg)  · Usual Body Wt: 129 lb (58.5 kg)  · % Weight Change:  ,  13.9% weight loss x 3 months  · Ideal Body Wt: 115 lb (52.2 kg), % Ideal Body 93%  · BMI Classification: BMI 18.5 - 24.9 Normal Weight  Recent Labs     04/01/19  1500      K 4.4   CL 94*   CO2 22   BUN 35*   CREATININE 1.42*   GLUCOSE 72   ALT 13   ALKPHOS 105*   GFR                 Lab Results   Component Value Date    LABALBU 4.2 04/01/2019    LABALBU 4.1 05/01/2012      Nutrition Interventions:   Modify current diet, Start ONS(add low fiber, start ensure enlive TID)  Continued Inpatient Monitoring, Education not appropriate at this time, Coordination of Care    Nutrition Evaluation:   · Evaluation: Goals set   · Goals: PO > 75% of meals and supplements    · Monitoring: Meal Intake, Weight, Pertinent Labs, Patient/Family Education      Electronically signed by Michoacano Aponte RD, LD on 4/2/19 at 9:25 AM    Contact Number: [phone]

## 2019-04-03 LAB
ABSOLUTE EOS #: 0.08 K/UL (ref 0–0.44)
ABSOLUTE IMMATURE GRANULOCYTE: 0.03 K/UL (ref 0–0.3)
ABSOLUTE LYMPH #: 1.54 K/UL (ref 1.1–3.7)
ABSOLUTE MONO #: 0.87 K/UL (ref 0.1–1.2)
ANION GAP SERPL CALCULATED.3IONS-SCNC: 11 MMOL/L (ref 9–17)
BASOPHILS # BLD: 1 % (ref 0–2)
BASOPHILS ABSOLUTE: 0.05 K/UL (ref 0–0.2)
BUN BLDV-MCNC: 20 MG/DL (ref 8–23)
BUN/CREAT BLD: 20 (ref 9–20)
CALCIUM SERPL-MCNC: 9 MG/DL (ref 8.6–10.4)
CHLORIDE BLD-SCNC: 105 MMOL/L (ref 98–107)
CO2: 26 MMOL/L (ref 20–31)
CREAT SERPL-MCNC: 1.01 MG/DL (ref 0.5–0.9)
DIFFERENTIAL TYPE: ABNORMAL
EOSINOPHILS RELATIVE PERCENT: 1 % (ref 1–4)
GFR AFRICAN AMERICAN: >60 ML/MIN
GFR NON-AFRICAN AMERICAN: 51 ML/MIN
GFR SERPL CREATININE-BSD FRML MDRD: ABNORMAL ML/MIN/{1.73_M2}
GFR SERPL CREATININE-BSD FRML MDRD: ABNORMAL ML/MIN/{1.73_M2}
GLUCOSE BLD-MCNC: 105 MG/DL (ref 70–99)
HCT VFR BLD CALC: 32.5 % (ref 36.3–47.1)
HEMOGLOBIN: 10.5 G/DL (ref 11.9–15.1)
IMMATURE GRANULOCYTES: 0 %
LYMPHOCYTES # BLD: 18 % (ref 24–43)
MCH RBC QN AUTO: 32.3 PG (ref 25.2–33.5)
MCHC RBC AUTO-ENTMCNC: 32.3 G/DL (ref 28.4–34.8)
MCV RBC AUTO: 100 FL (ref 82.6–102.9)
MONOCYTES # BLD: 10 % (ref 3–12)
NRBC AUTOMATED: 0 PER 100 WBC
PDW BLD-RTO: 15.1 % (ref 11.8–14.4)
PLATELET # BLD: 206 K/UL (ref 138–453)
PLATELET ESTIMATE: ABNORMAL
PMV BLD AUTO: 11.7 FL (ref 8.1–13.5)
POTASSIUM SERPL-SCNC: 4.1 MMOL/L (ref 3.7–5.3)
PROCALCITONIN: 0.12 NG/ML
RBC # BLD: 3.25 M/UL (ref 3.95–5.11)
RBC # BLD: ABNORMAL 10*6/UL
SEG NEUTROPHILS: 70 % (ref 36–65)
SEGMENTED NEUTROPHILS ABSOLUTE COUNT: 5.96 K/UL (ref 1.5–8.1)
SODIUM BLD-SCNC: 142 MMOL/L (ref 135–144)
WBC # BLD: 8.5 K/UL (ref 3.5–11.3)
WBC # BLD: ABNORMAL 10*3/UL

## 2019-04-03 PROCEDURE — 85025 COMPLETE CBC W/AUTO DIFF WBC: CPT

## 2019-04-03 PROCEDURE — 80048 BASIC METABOLIC PNL TOTAL CA: CPT

## 2019-04-03 PROCEDURE — 97116 GAIT TRAINING THERAPY: CPT

## 2019-04-03 PROCEDURE — 36415 COLL VENOUS BLD VENIPUNCTURE: CPT

## 2019-04-03 PROCEDURE — 84145 PROCALCITONIN (PCT): CPT

## 2019-04-03 PROCEDURE — 1200000000 HC SEMI PRIVATE

## 2019-04-03 PROCEDURE — 97110 THERAPEUTIC EXERCISES: CPT

## 2019-04-03 PROCEDURE — 6370000000 HC RX 637 (ALT 250 FOR IP): Performed by: FAMILY MEDICINE

## 2019-04-03 PROCEDURE — 99232 SBSQ HOSP IP/OBS MODERATE 35: CPT | Performed by: FAMILY MEDICINE

## 2019-04-03 PROCEDURE — 6360000002 HC RX W HCPCS: Performed by: FAMILY MEDICINE

## 2019-04-03 PROCEDURE — 2580000003 HC RX 258: Performed by: FAMILY MEDICINE

## 2019-04-03 RX ADMIN — ATORVASTATIN CALCIUM 40 MG: 40 TABLET, FILM COATED ORAL at 08:13

## 2019-04-03 RX ADMIN — APIXABAN 2.5 MG: 2.5 TABLET, FILM COATED ORAL at 08:12

## 2019-04-03 RX ADMIN — PANTOPRAZOLE SODIUM 40 MG: 40 TABLET, DELAYED RELEASE ORAL at 08:17

## 2019-04-03 RX ADMIN — Medication 10 ML: at 21:40

## 2019-04-03 RX ADMIN — FAMOTIDINE 20 MG: 20 TABLET ORAL at 08:12

## 2019-04-03 RX ADMIN — CARVEDILOL 6.25 MG: 6.25 TABLET, FILM COATED ORAL at 08:13

## 2019-04-03 RX ADMIN — CARVEDILOL 6.25 MG: 6.25 TABLET, FILM COATED ORAL at 17:31

## 2019-04-03 RX ADMIN — POLYETHYLENE GLYCOL (3350) 17 G: 17 POWDER, FOR SOLUTION ORAL at 08:13

## 2019-04-03 RX ADMIN — SILDENAFIL CITRATE 20 MG: 20 TABLET ORAL at 14:42

## 2019-04-03 RX ADMIN — LEVOTHYROXINE SODIUM 100 MCG: 100 TABLET ORAL at 08:12

## 2019-04-03 RX ADMIN — CEFAZOLIN 1 G: 1 INJECTION, POWDER, FOR SOLUTION INTRAMUSCULAR; INTRAVENOUS at 03:40

## 2019-04-03 RX ADMIN — SILDENAFIL CITRATE 20 MG: 20 TABLET ORAL at 08:50

## 2019-04-03 RX ADMIN — APIXABAN 2.5 MG: 2.5 TABLET, FILM COATED ORAL at 21:39

## 2019-04-03 RX ADMIN — SILDENAFIL CITRATE 20 MG: 20 TABLET ORAL at 21:40

## 2019-04-03 RX ADMIN — SODIUM CHLORIDE: 9 INJECTION, SOLUTION INTRAVENOUS at 10:23

## 2019-04-03 RX ADMIN — MAGNESIUM HYDROXIDE 30 ML: 400 SUSPENSION ORAL at 12:49

## 2019-04-03 ASSESSMENT — PAIN SCALES - GENERAL
PAINLEVEL_OUTOF10: 0

## 2019-04-03 NOTE — PROGRESS NOTES
Physical Therapy  Facility/Department: [facility] AT THE [facility] MED SURG  Daily Treatment Note  NAME: Julieta Lott  : 10/13/1927  MRN: [id number]    Date of Service: 4/3/2019    Discharge Recommendations:           Patient Diagnosis(es): There were no encounter diagnoses. has a past medical history of Abnormal echocardiogram, Abnormal resting ECG findings, Allergic rhinitis, Anxiety, ASHD (arteriosclerotic heart disease), ASHD (arteriosclerotic heart disease), Diastolic congestive heart failure (HCC), Elevated liver enzymes, GERD (gastroesophageal reflux disease), H/O echocardiogram, H/O echocardiogram, H/O echocardiogram, Hiatal hernia, History of echocardiogram, History of stress test, Hyperglycemia, Hyperlipidemia, Hypertension, Hyperthyroidism, Kidney stone, Menopause, Osteoarthritis, PAH (pulmonary artery hypertension) ([address] Pulmonary hypertension ([address] Status post right heart catheterization, Tricuspid regurgitation, and Unspecified hypothyroidism. has a past surgical history that includes eye surgery (Bilateral, 10/2014); Cataract removal (10/2015); and Cardiac catheterization (Left, 1997). Restrictions  Restrictions/Precautions  Restrictions/Precautions: General Precautions, Fall Risk  Subjective   General  Chart Reviewed: Yes  Response To Previous Treatment: Patient with no complaints from previous session. Subjective  Subjective: Pt denies pain. Pt states \"I have no pain and I hope it stays away. \"  Pt is in high hopes of getting to leave \"hopefully by tomorrow. \"  Pain Screening  Patient Currently in Pain: No  Vital Signs  Patient Currently in Pain: No       Orientation  Orientation  Overall Orientation Status: Within Functional Limits  Cognition      Objective      Transfers  Sit to Stand: Supervision  Stand to sit: Supervision  Ambulation  Ambulation?: Yes  WB Status: unrestricted  Ambulation 1  Surface: level tile  Device: Rolling Walker  Assistance: Stand by assistance  Quality of Gait: Slow, steady yelena with reciprocal pattern. Distance: 300 ft x1   Comments: No LOB or instabilities noted with amb. Stairs/Curb  Stairs?: No      Assessment   Assessment: Pt had good tolerance to tx on this date. SUP with transfers and ambulation. Treatment Diagnosis: difficulty walking  Prognosis: Good  Patient Education: Reviewed and given discharge folder. Pt with appropriate understanding. REQUIRES PT FOLLOW UP: Yes  Activity Tolerance  Activity Tolerance: Patient Tolerated treatment well       Goals  Short term goals  Time Frame for Short term goals: 10 days  Short term goal 1: Initiate HEP and progress exercises for strengthening and mobility. Short term goal 2: Pt will be modified independent with bed mobility without use of rails. Short term goal 3: Pt will be modified independent with transfers with least restrictive device and good safety. Short term goal 4: Pt will  be modified independence with least restrictive device for all transfers with good balance to reduce risk for fall. Short term goal 5: Pt will be modified independent with ambulation for up to 150 ft to allow pat to discharge home safely. Patient Goals   Patient goals : 10 days    Plan    Plan  Times per week: 7  Times per day: Twice a day(twice daily with exception of weekends, daily)  Current Treatment Recommendations: Strengthening, Transfer Training, Endurance Training, Neuromuscular Re-education, Patient/Caregiver Education & Training, Balance Training, Gait Training, Home Exercise Program, Functional Mobility Training, Stair training, Safety Education & Training  Safety Devices  Type of devices:  All fall risk precautions in place, Call light within reach, Gait belt, Left in chair, Nurse notified     Therapy Time   Individual Concurrent Group Co-treatment   Time In 1551         Time Out 1607         Minutes [address] [place] [address]

## 2019-04-03 NOTE — PROGRESS NOTES
Progress Note    SUBJECTIVE:  Had rather severe cramping yesterday  No stool yet  Passing flatus and this makes her pain better she states   No nausea  No fever  Up to chair    OBJECTIVE:    Vitals:   Vitals:    04/02/19 2300   BP: 135/77   Pulse: 74   Resp: 18   Temp: 98.6 °F (37 °C)   SpO2:      Weight: 109 lb 14.4 oz (49.9 kg)   Height: 5' 3\" (160 cm)   -----------------------------------------------------------------  Exam:    CONSTITUTIONAL:  awake and alert  EYES:  Lids and lashes normal, pupils equal, round and reactive to light, extra ocular muscles intact, sclera clear, conjunctiva normal  ENT:  normocepalic, without obvious abnormality  NECK:  supple, symmetrical, trachea midline  HEMATOLOGIC/LYMPHATICS:  no cervical lymphadenopathy  BACK:  spinous processes are non-tender on palpation  LUNGS:  no increased work of breathing and diminished breath sounds right base and left base  CARDIOVASCULAR:  normal apical pulses, irregularly irregular rhythm and normal S1 and S2  ABDOMEN:  Mild distention lower abdomne  Not tender at this time    MUSCULOSKELETAL:  there is no redness, warmth, or swelling of the joints  NEUROLOGIC:  Mental Status Exam:  Level of Alertness:   awake  Orientation:   person, place, time  Cranial Nerves:  cranial nerves II-XII are grossly intact  SKIN:  no bruising or bleeding  EXT:     no cyanosis, clubbing or edema present    -----------------------------------------------------------------  Diagnostic Data: Reviewed    ASSESSMENT:   Principal Problem:    Acute diverticulitis  Active Problems:    Chronic disease anemia    Severe malnutrition (HCC)    Dehydration  Resolved Problems:    * No resolved hospital problems.  *        PLAN:  · I believe this is not infectious but more colonic spasm  I will check with dr Maliha Escobar for his opinion  Will trial Levsin  Ambulate more  Stop antibiotics      Leonora Henriquez M.D.

## 2019-04-03 NOTE — PROGRESS NOTES
Physical Therapy  Facility/Department: [facility] AT THE [facility] MED SURG  Daily Treatment Note  NAME: Ricardo Turner  : 10/13/1927  MRN: [id number]    Date of Service: 4/3/2019    Discharge Recommendations:           Patient Diagnosis(es): There were no encounter diagnoses. has a past medical history of Abnormal echocardiogram, Abnormal resting ECG findings, Allergic rhinitis, Anxiety, ASHD (arteriosclerotic heart disease), ASHD (arteriosclerotic heart disease), Diastolic congestive heart failure (HCC), Elevated liver enzymes, GERD (gastroesophageal reflux disease), H/O echocardiogram, H/O echocardiogram, H/O echocardiogram, Hiatal hernia, History of echocardiogram, History of stress test, Hyperglycemia, Hyperlipidemia, Hypertension, Hyperthyroidism, Kidney stone, Menopause, Osteoarthritis, PAH (pulmonary artery hypertension) ([facility] [address] Pulmonary hypertension ([facility] [address] Status post right heart catheterization, Tricuspid regurgitation, and Unspecified hypothyroidism. has a past surgical history that includes eye surgery (Bilateral, 10/2014); Cataract removal (10/2015); and Cardiac catheterization (Left, 1997). Restrictions  Restrictions/Precautions  Restrictions/Precautions: General Precautions, Fall Risk  Subjective   General  Chart Reviewed: Yes  Response To Previous Treatment: Patient with no complaints from previous session. Subjective  Subjective: Pt denies pain, feels \"much better\"  Pain Screening  Patient Currently in Pain: Denies  Vital Signs  Patient Currently in Pain: Denies       Orientation  Orientation  Overall Orientation Status: Within Functional Limits  Cognition      Objective      Transfers  Sit to Stand: Supervision  Stand to sit: Supervision  Ambulation  Ambulation?: Yes  WB Status: unrestricted  Ambulation 1  Surface: level tile  Device: Rolling Walker  Assistance: Stand by assistance  Quality of Gait: Slow steady yelena with FF posture  Distance: 300 ft x1  Comments: No LOB or unsteadiness noted. Balance  Standing - Static: Fair;+  Standing - Dynamic: Fair;+  Exercises  Straight Leg Raise: x15  Quad Sets: x15  Heelslides: x15  Hip Abduction: x15  Knee Long Arc Quad: x15  Knee Short Arc Quad: x15  Ankle Pumps: 15x2  Comments: seated marching x15, BLE ther ex completed reclined and seated                        Assessment   Body structures, Functions, Activity limitations: Decreased functional mobility ; Decreased safe awareness;Decreased balance;Decreased ADL status; Decreased endurance;Decreased strength  Assessment: Pt demonstrated good tolerance to tx. Completed BLE ther ex x15 reps each. Transfers with Sup. Ambulated 300 ft x1 with SBA and RW. No LOB or unsteadiness noted. Treatment Diagnosis: difficulty walking  Prognosis: Good  Patient Education: Reviewed safety with amb and transfers, pt with appropriate understanding. REQUIRES PT FOLLOW UP: Yes     G-Code     OutComes Score                                                  AM-PAC Score             Goals  Short term goals  Time Frame for Short term goals: 10 days  Short term goal 1: Initiate HEP and progress exercises for strengthening and mobility. Short term goal 2: Pt will be modified independent with bed mobility without use of rails. Short term goal 3: Pt will be modified independent with transfers with least restrictive device and good safety. Short term goal 4: Pt will  be modified independence with least restrictive device for all transfers with good balance to reduce risk for fall. Short term goal 5: Pt will be modified independent with ambulation for up to 150 ft to allow pat to discharge home safely.   Patient Goals   Patient goals : 10 days    Plan    Plan  Times per week: 7  Times per day: Twice a day(twice daily with exception of weekends, daily)  Current Treatment Recommendations: Strengthening, Transfer Training, Endurance Training, Neuromuscular Re-education, Patient/Caregiver Education & Training, Balance Training, Gait Training, Home Exercise Program, Functional Mobility Training, Stair training, Safety Education & Training  Safety Devices  Type of devices:  All fall risk precautions in place, Call light within reach, Gait belt, Left in chair, Nurse notified     Therapy Time   Individual Concurrent Group Co-treatment   Time In [facility] 28         Time Out 0801         Minutes [address] [id number]

## 2019-04-04 VITALS
SYSTOLIC BLOOD PRESSURE: 119 MMHG | DIASTOLIC BLOOD PRESSURE: 59 MMHG | WEIGHT: 109.9 LBS | TEMPERATURE: 98 F | BODY MASS INDEX: 19.47 KG/M2 | RESPIRATION RATE: 17 BRPM | HEART RATE: 84 BPM | HEIGHT: 63 IN | OXYGEN SATURATION: 93 %

## 2019-04-04 PROBLEM — K57.30 DIVERTICULOSIS OF LARGE INTESTINE WITHOUT HEMORRHAGE: Status: ACTIVE | Noted: 2019-04-04

## 2019-04-04 PROCEDURE — 99222 1ST HOSP IP/OBS MODERATE 55: CPT | Performed by: FAMILY MEDICINE

## 2019-04-04 PROCEDURE — 6370000000 HC RX 637 (ALT 250 FOR IP): Performed by: FAMILY MEDICINE

## 2019-04-04 PROCEDURE — 99239 HOSP IP/OBS DSCHRG MGMT >30: CPT | Performed by: FAMILY MEDICINE

## 2019-04-04 RX ADMIN — CARVEDILOL 6.25 MG: 6.25 TABLET, FILM COATED ORAL at 08:24

## 2019-04-04 RX ADMIN — APIXABAN 2.5 MG: 2.5 TABLET, FILM COATED ORAL at 08:24

## 2019-04-04 RX ADMIN — SILDENAFIL CITRATE 20 MG: 20 TABLET ORAL at 08:27

## 2019-04-04 RX ADMIN — LEVOTHYROXINE SODIUM 100 MCG: 100 TABLET ORAL at 08:24

## 2019-04-04 RX ADMIN — POLYETHYLENE GLYCOL (3350) 17 G: 17 POWDER, FOR SOLUTION ORAL at 08:24

## 2019-04-04 RX ADMIN — FAMOTIDINE 20 MG: 20 TABLET ORAL at 08:24

## 2019-04-04 RX ADMIN — ATORVASTATIN CALCIUM 40 MG: 40 TABLET, FILM COATED ORAL at 08:24

## 2019-04-04 ASSESSMENT — PAIN SCALES - GENERAL
PAINLEVEL_OUTOF10: 0

## 2019-04-04 NOTE — PROGRESS NOTES
Discharge instructions given to patient at this time. Patient voices understanding of all medications and where to pick them up at. No other questions or concerns voiced at this time. Waiting for patients son to pick her up to take her to private residence.

## 2019-04-04 NOTE — PLAN OF CARE
Problem: Pain:  Goal: Control of acute pain  Description  Control of acute pain  Outcome: Ongoing  Note:   Patient denies pain at this time. Will continue to monitor pain routinely and medicate as ordered per patient's needs. Problem: Falls - Risk of:  Goal: Will remain free from falls  Description  Will remain free from falls  Outcome: Ongoing  Note:   Patient will remain free from falls and injury. Patient will have no skid-socks on, call light in reach, bed alarm on. Problem: Musculor/Skeletal Functional Status  Goal: Absence of falls  Outcome: Ongoing  Note:   Patient will remain free from falls and injury. Patient will have no skid-socks on, call light in reach, bed alarm on.
Problem: Pain:  Goal: Pain level will decrease  Description  Pain level will decrease  4/2/2019 1009 by Elana Chadwick RN  Outcome: Ongoing  Note:   Pain assessment completed routinely. Patient with intermittent lower abdominal pain, norco given with effectiveness. Will continue to monitor and medicate as needed. Problem: Falls - Risk of:  Goal: Will remain free from falls  Description  Will remain free from falls  4/2/2019 1009 by Elana Chadwick RN  Outcome: Ongoing  Note:   Patient is alert and oriented and has demonstrated the use of the call light for assistance before getting up. Education has been provided to defer the use of the bed alarm and/or restraint free alarm as the patient has shown competency in calling for assistance and verbalizing the risk. Problem: Bowel/Gastric:  Goal: Control of bowel function will improve  Description  Control of bowel function will improve  Note:   Patient reports no BM since 5/29/19. Miralax given this am. Fluids and ambulation encouraged.
Problem: Pain:  Goal: Pain level will decrease  Description  Pain level will decrease  Outcome: Ongoing  Note:   Patient denies pain at this time. Will continue to monitor pain routinely and medicate per patient's needs. Problem: Falls - Risk of:  Goal: Will remain free from falls  Description  Will remain free from falls  Outcome: Ongoing  Note:   Patient will remain free from falls and injury. Patient will have no skid-socks on, call light in reach, bed alarm on.
pattern will improve  Description  Ability to achieve a regular elimination pattern will improve  Outcome: Ongoing

## 2019-04-04 NOTE — DISCHARGE SUMMARY
Discharge Summary    Juana Tobar  :  10/13/1927  MRN:  [id number]    Admit date:  2019      Discharge date: 2019     Admitting Physician:  Jovan Gutierrez MD    Consultants:  none    Procedures: none    Complications: none    Discharge Condition: good    Hospital Course:   Juana Tobar is a 80 y.o. female admitted with severe dehydration and malnutrition due to acute abdominal pain and nausea  This progressed to dehydration  She had severe lower abdominal pain without fever or melena or bloody stool  She was seen inER and follow up in office led to admission due to the severe dehydraion  Admission diagnosis was elusive but working diagnosis was diverticulitis though CT did not confirm  Her spasm type pain persisted tll day 3 when it seemed to precipitously cease without use of antispasmotic  She did receive IV ancef though no seeming result and procalcitonin level was marginal only  At time of discharge she was hydreated eating regular diet and bowels ok  She was ambulatory and no cardiac or respirtatory complications de    Exam:  GEN:  alert and oriented to person, place and time  EYES: PERRL and EOMI  NECK: normal,  no carotid bruits  PULM: rales present- bases  COR: irregularly irregular  ABD:  soft, non-tender, non-distended, normal bowel sounds, no masses or organomegaly  EXT:   no cyanosis, clubbing or edema present    NEURO: follows commands, [place], no deficits  SKIN:  no rashes or significant lesions    Significant Diagnostic Studies:   Lab Results   Component Value Date    WBC 8.5 2019    HGB 10.5 (L) 2019     2019       Lab Results   Component Value Date    BUN 20 2019    CREATININE 1.01 (H) 2019     2019    K 4.1 2019    CALCIUM 9.0 2019     2019    CO2 26 2019    LABGLOM 51 (L) 2019       Lab Results   Component Value Date    WBCUA None 2019    RBCUA None 2019    EPITHUA 0 TO 2 2019 LEUKOCYTESUR NEGATIVE 04/02/2019    SPECGRAV 1.020 04/02/2019    GLUCOSEU NEGATIVE 04/02/2019    KETUA 1+ (A) 04/02/2019    PROTEINU NEGATIVE 04/02/2019    HGBUR NEGATIVE 04/02/2019    CASTUA NOT REPORTED 03/29/2019    CRYSTUA NOT REPORTED 04/02/2019    BACTERIA TRACE (A) 04/02/2019    YEAST NOT REPORTED 04/02/2019       Xr Acute Abd Series Chest 1 Vw    Result Date: 4/1/2019  EXAMINATION: TWO XRAY VIEWS OF THE ABDOMEN AND SINGLE  XRAY VIEW OF THE CHEST 4/1/2019 4:07 pm COMPARISON: None. HISTORY: ORDERING SYSTEM PROVIDED HISTORY: abdom pain TECHNOLOGIST PROVIDED HISTORY: abdom pain FINDINGS: Mild cardiomegaly. Mediastinum normal.  Mild edema. Small bilateral pleural effusions. No subdiaphragmatic air. Large hiatal hernia. Colon is filled with gas. Calcified uterine fibroids. Mild dextroconvex scoliosis. Atherosclerotic calcified plaque along the aorta. Bilateral pleural effusions. Large hiatal hernia. Ileus. Uterine fibroids. Discharge Diagnoses:    Principal Problem:    Dehydration  Active Problems:    Chronic disease anemia    Severe malnutrition (HCC)    Hiatal hernia    Hypothyroidism    Pulmonary hypertension ([address]    Diverticulosis of large intestine without hemorrhage  Resolved Problems:    * No resolved hospital problems.  *      Active Hospital Problems    Diagnosis Date Noted    Chronic disease anemia [D63.8] 11/03/2015     Priority: High    Severe malnutrition ([address] [E43] 04/02/2019     Priority: Medium     Class: Present on Admission    Diverticulosis of large intestine without hemorrhage [K57.30] 04/04/2019    Dehydration [E86.0] 10/27/2017    Pulmonary hypertension ([address] [I27.20] 10/02/2017    Hypothyroidism [E03.9] 02/12/2015    Hiatal hernia [K44.9]        Discharge Medications:       [address] Medication Instructions [id number]    Printed on:04/04/19 0819   Medication Information                      acetaminophen (TYLENOL) 500 MG tablet  Take 500 mg by mouth every 6 hours as needed for Pain or Fever             amLODIPine (NORVASC) 2.5 MG tablet  Take 1 tablet by mouth daily             apixaban (ELIQUIS) 2.5 MG TABS tablet  Take 1 tablet by mouth 2 times daily             atorvastatin (LIPITOR) 40 MG tablet  TAKE ONE TABLET BY MOUTH DAILY             calcium carbonate (TUMS) 500 MG chewable tablet  Take 1 tablet by mouth daily              carvedilol (COREG) 6.25 MG tablet  Take 1 tablet by mouth 2 times daily (with meals)             ferrous sulfate 325 (65 FE) MG tablet  Take 1 tablet by mouth 2 times daily (with meals)             furosemide (LASIX) 20 MG tablet  TAKE 1 TABLET DAILY IF THERE IS A 1 LB WEIGHT GAIN THEN TAKE 2 TABLETS ON THOSE DAYS             hyoscyamine (LEVSIN/SL) 125 MCG sublingual tablet  Place 1 tablet under the tongue every 4 hours as needed for Cramping             levothyroxine (SYNTHROID) 100 MCG tablet  TAKE ONE TABLET BY MOUTH DAILY             Misc. Devices (STEP N REST WALKER) MISC  1 Device by Does not apply route continuous             omeprazole (PRILOSEC) 40 MG delayed release capsule  TAKE ONE CAPSULE BY MOUTH DAILY             rOPINIRole (REQUIP) 0.5 MG tablet  TAKE 1 TABLET BY MOUTH NIGHTLY             sildenafil (REVATIO) 20 MG tablet  sildenafil (antihypertensive) 20 mg tablet  Take 1 tablet 2  times a day by oral route for 90 days. spironolactone (ALDACTONE) 25 MG tablet  Take 1 tablet by mouth daily                 Patient Instructions:    Activity: ambulate in house  Diet: regular diet  Wound Care: none needed  Other:      Disposition:   Discharge to Home    Follow up:  Patient will be followed by Leonora Henriquez MD in 1-2 weeks    CORE MEASURES on Discharge (if applicable)  ACE/ARB in CHF: Yes  Statin in MI: NA  ASA in [place]: NA  Statin in CVA: NA  Antiplatelet in CVA: NA    Total time spent on discharge services: 35 minutes    Including the following activities:  Evaluation and Management of patient  Discussion with patient and/or surrogate about current care plan  Coordination with Case Management and/or   Coordination of care with Consultants (if applicable)   Coordination of care with Receiving Facility Physician (if applicable)  Completion of DME forms (if applicable)  Preparation of Discharge Summary  Preparation of Medication Reconciliation  Preparation of Discharge Prescriptions    Signed:   Reema Benson MD  4/4/2019, 8:19 AM

## 2019-04-05 ENCOUNTER — CARE COORDINATION (OUTPATIENT)
Dept: CASE MANAGEMENT | Age: 84
End: 2019-04-05

## 2019-04-08 ENCOUNTER — OFFICE VISIT (OUTPATIENT)
Dept: FAMILY MEDICINE CLINIC | Age: 84
End: 2019-04-08
Payer: MEDICARE

## 2019-04-08 ENCOUNTER — HOSPITAL ENCOUNTER (OUTPATIENT)
Age: 84
Discharge: HOME | End: 2019-04-08
Payer: MEDICARE

## 2019-04-08 VITALS
SYSTOLIC BLOOD PRESSURE: 116 MMHG | WEIGHT: 111.6 LBS | HEIGHT: 63 IN | DIASTOLIC BLOOD PRESSURE: 64 MMHG | BODY MASS INDEX: 19.77 KG/M2

## 2019-04-08 DIAGNOSIS — I27.20 PULMONARY HYPERTENSION (HCC): ICD-10-CM

## 2019-04-08 DIAGNOSIS — R10.32 ABDOMINAL PAIN, LLQ: ICD-10-CM

## 2019-04-08 DIAGNOSIS — I50.23 CHF (CONGESTIVE HEART FAILURE), NYHA CLASS I, ACUTE ON CHRONIC, SYSTOLIC (HCC): Primary | ICD-10-CM

## 2019-04-08 DIAGNOSIS — N18.30 CHRONIC RENAL FAILURE, STAGE 3 (MODERATE) (HCC): ICD-10-CM

## 2019-04-08 DIAGNOSIS — I50.23 CHF (CONGESTIVE HEART FAILURE), NYHA CLASS I, ACUTE ON CHRONIC, SYSTOLIC (HCC): ICD-10-CM

## 2019-04-08 DIAGNOSIS — K57.90 DIVERTICULOSIS OF INTESTINE WITHOUT BLEEDING, UNSPECIFIED INTESTINAL TRACT LOCATION: ICD-10-CM

## 2019-04-08 LAB
ANION GAP SERPL CALCULATED.3IONS-SCNC: 13 MMOL/L (ref 9–17)
BNP INTERPRETATION: ABNORMAL
BUN BLDV-MCNC: 15 MG/DL (ref 8–23)
BUN/CREAT BLD: 17 (ref 9–20)
CALCIUM SERPL-MCNC: 9.7 MG/DL (ref 8.6–10.4)
CHLORIDE BLD-SCNC: 100 MMOL/L (ref 98–107)
CO2: 28 MMOL/L (ref 20–31)
CREAT SERPL-MCNC: 0.88 MG/DL (ref 0.5–0.9)
GFR AFRICAN AMERICAN: >60 ML/MIN
GFR NON-AFRICAN AMERICAN: >60 ML/MIN
GFR SERPL CREATININE-BSD FRML MDRD: ABNORMAL ML/MIN/{1.73_M2}
GFR SERPL CREATININE-BSD FRML MDRD: ABNORMAL ML/MIN/{1.73_M2}
GLUCOSE BLD-MCNC: 88 MG/DL (ref 70–99)
POTASSIUM SERPL-SCNC: 3.6 MMOL/L (ref 3.7–5.3)
PRO-BNP: 2727 PG/ML
SODIUM BLD-SCNC: 141 MMOL/L (ref 135–144)

## 2019-04-08 PROCEDURE — 83880 ASSAY OF NATRIURETIC PEPTIDE: CPT

## 2019-04-08 PROCEDURE — 36415 COLL VENOUS BLD VENIPUNCTURE: CPT

## 2019-04-08 PROCEDURE — 1123F ACP DISCUSS/DSCN MKR DOCD: CPT | Performed by: FAMILY MEDICINE

## 2019-04-08 PROCEDURE — 1036F TOBACCO NON-USER: CPT | Performed by: FAMILY MEDICINE

## 2019-04-08 PROCEDURE — 99214 OFFICE O/P EST MOD 30 MIN: CPT | Performed by: FAMILY MEDICINE

## 2019-04-08 PROCEDURE — 1111F DSCHRG MED/CURRENT MED MERGE: CPT | Performed by: FAMILY MEDICINE

## 2019-04-08 PROCEDURE — 1090F PRES/ABSN URINE INCON ASSESS: CPT | Performed by: FAMILY MEDICINE

## 2019-04-08 PROCEDURE — 80048 BASIC METABOLIC PNL TOTAL CA: CPT

## 2019-04-08 PROCEDURE — G8427 DOCREV CUR MEDS BY ELIG CLIN: HCPCS | Performed by: FAMILY MEDICINE

## 2019-04-08 PROCEDURE — G8598 ASA/ANTIPLAT THER USED: HCPCS | Performed by: FAMILY MEDICINE

## 2019-04-08 PROCEDURE — 4040F PNEUMOC VAC/ADMIN/RCVD: CPT | Performed by: FAMILY MEDICINE

## 2019-04-08 PROCEDURE — G8420 CALC BMI NORM PARAMETERS: HCPCS | Performed by: FAMILY MEDICINE

## 2019-04-08 RX ORDER — LEVOTHYROXINE SODIUM 0.1 MG/1
TABLET ORAL
Qty: 90 TABLET | Refills: 3 | Status: ON HOLD | OUTPATIENT
Start: 2019-04-08 | End: 2019-11-06 | Stop reason: HOSPADM

## 2019-04-08 ASSESSMENT — PATIENT HEALTH QUESTIONNAIRE - PHQ9
SUM OF ALL RESPONSES TO PHQ QUESTIONS 1-9: 0
2. FEELING DOWN, DEPRESSED OR HOPELESS: 0
1. LITTLE INTEREST OR PLEASURE IN DOING THINGS: 0
SUM OF ALL RESPONSES TO PHQ9 QUESTIONS 1 & 2: 0
SUM OF ALL RESPONSES TO PHQ QUESTIONS 1-9: 0

## 2019-04-08 NOTE — PROGRESS NOTES
Jerman WADE [facility], am scribing for and in the presence of Dr. Fabiola Hernandez. 04/08/19 10:46 am 305 [facility] [address] [facility]  Ezekiel Beasley [address]  Dept: [phone]    Vincent Lorenzo is a 80 y.o. female here for 3 Month Follow-Up; Hyperlipidemia; Hypertension; and Gastroesophageal Reflux      HPI:  GERD  Curtis has a history of heartburn. Current medical therapy includes Tums, omeprazole. Aggravating factors include none.      HYPERLIPIDEMIA   Medication compliance: compliant all of the time. Patient is following a low fat, low cholesterol diet. She is exercising regularly, cleaning her house.      HYPERTENSION  She is exercising, cleaning her house and is adherent to a low-salt diet. Blood pressure is not being monitored at home. Cardiac symptoms: none. Patient denies: chest pain and palpitations.     Pt is accompanied by her daughter. She was admitted to [facility] on 04/01 for dehydration and malnutrition secondary to acture abd pain and nausea. She was d/c home on 04/04/19 and her d/c summary is as follows:     Evelyn Clancy is a 80 y.o. female admitted with severe dehydration and malnutrition due to acute abdominal pain and nausea  This progressed to dehydration  She had severe lower abdominal pain without fever or melena or bloody stool  She was seen in ER and follow up in office led to admission due to the severe dehydraion  Admission diagnosis was elusive but working diagnosis was diverticulitis though CT did not confirm  Her spasm type pain persisted all day 3 when it seemed to precipitously cease without use of antispasmotic  She did receive IV ancef though no seeming result and procalcitonin level was marginal only  At time of discharge she was hydrated eating regular diet and bowels ok  She was ambulatory and no cardiac or respirtatory complications. \"        Prior to Admission medications    Medication Sig Start Date End Date Taking? Authorizing Provider   levothyroxine (SYNTHROID) 100 MCG tablet TAKE ONE TABLET BY MOUTH DAILY 4/8/19  Yes Abena Jimenez MD   carvedilol (COREG) 6.25 MG tablet Take 1 tablet by mouth 2 times daily (with meals) 3/10/19  Yes Abena Jimenez MD   amLODIPine (NORVASC) 2.5 MG tablet Take 1 tablet by mouth daily 3/11/19  Yes Abena Jimenez MD   sildenafil (REVATIO) 20 MG tablet sildenafil (antihypertensive) 20 mg tablet  Take 1 tablet 2  times a day by oral route for 90 days. Yes Historical Provider, MD   Misc.  Devices (STEP N REST WALKER) MISC 1 Device by Does not apply route continuous 2/25/19  Yes Abena Jimenez MD   apixaban (ELIQUIS) 2.5 MG TABS tablet Take 1 tablet by mouth 2 times daily 2/14/19  Yes Lizeth Arboleda MD   omeprazole (PRILOSEC) 40 MG delayed release capsule TAKE ONE CAPSULE BY MOUTH DAILY 12/12/18  Yes Abena Jimenez MD   spironolactone (ALDACTONE) 25 MG tablet Take 1 tablet by mouth daily 10/2/18  Yes Abena Jimenez MD   acetaminophen (TYLENOL) 500 MG tablet Take 500 mg by mouth every 6 hours as needed for Pain or Fever   Yes Historical Provider, MD   furosemide (LASIX) 20 MG tablet TAKE 1 TABLET DAILY IF THERE IS A 1 LB WEIGHT GAIN THEN TAKE 2 TABLETS ON THOSE DAYS  Patient taking differently: Take 20 mg by mouth daily TAKE 1 TABLET DAILY IF THERE IS A 1 LB WEIGHT GAIN THEN TAKE 2 TABLETS ON THOSE DAYS 8/13/18  Yes Abena Jimenez MD   atorvastatin (LIPITOR) 40 MG tablet TAKE ONE TABLET BY MOUTH DAILY 11/17/17  Yes Abena Jimenez MD   calcium carbonate (TUMS) 500 MG chewable tablet Take 1 tablet by mouth daily as needed    Yes Historical Provider, MD   hyoscyamine (LEVSIN/SL) 125 MCG sublingual tablet Place 1 tablet under the tongue every 4 hours as needed for Cramping 4/4/19   Abena Jimenez MD   rOPINIRole (REQUIP) 0.5 MG tablet TAKE 1 TABLET BY MOUTH NIGHTLY 8/8/16   Abena Jimenez MD   ferrous sulfate 325 (65 FE) MG tablet Take 1 tablet by mouth 2 times daily (with meals)  Patient taking differently: Take 325 mg by mouth 2 times daily  11/4/15   Clydene Closs, MD       ROS:  General Constitutional: Denies chills. Denies fever. Denies headache. Denies lightheadedness. Ophthalmologic: Denies blurred vision. Admits appetite has improved but she still gets full quickly  ENT: Denies nasal congestion. Denies sore throat. Denies ear pain and pressure. Respiratory: Denies cough. Denies shortness of breath. Denies wheezing. Cardiovascular: Denies chest pain at rest. Denies irregular heartbeat. Denies palpitations. Gastrointestinal: Denies abdominal pain. Denies blood in the stool. Denies constipation. Denies diarrhea. Denies nausea. Denies vomiting. Genitourinary: Denies blood in the urine. Denies difficulty urinating. Denies frequent urination. Denies painful urination. Denies urinary incontinence. Musculoskeletal: Denies muscle aches. Denies painful joints. Denies swollen joints. Peripheral Vascular: Denies pain/cramping in legs after exertion. Skin: Denies dry skin. Denies itching. Denies rash. Neurologic: Denies falls. Denies dizziness. Denies fainting. Denies tingling/numbness. Psychiatric: Denies sleep disturbance. Denies anxiety. Denies depressed mood. Past Surgical History:   Procedure Laterality Date    CARDIAC CATHETERIZATION Left 8/1997    Dr. Wilder Lea  10/2015    Dr. Katy Aburto Bilateral 10/2014    Cataracts Surgery       Family History   Problem Relation Age of Onset    High Blood Pressure Mother     Heart Failure Mother     Other Mother         DJD    Stroke Father     Heart Disease Sister     High Blood Pressure Sister     High Cholesterol Sister     Cancer Sister         metastatic endometrial CA, Cause of death    Other Brother         CAD       Past Medical History:   Diagnosis Date    Abnormal echocardiogram 5/15/12    EF >55%. mild diastolic dysfunction.  RVSP 70 mmHg on echocardiogram. the right ventricle is moderately to severely enlarged with preserved function. Severe tricuspid regurgitation    Abnormal resting ECG findings 6/19/12    sinus bradycardia 55 beats per minute. Incomplete right bundle branch block. Evidence of left atrial enlargement. Abnormal ECG a    Allergic rhinitis 4/17/2017    Anxiety     ASHD (arteriosclerotic heart disease) 8/1997    CA 50-60%    ASHD (arteriosclerotic heart disease) 08/97    CA 68-81%    Diastolic congestive heart failure (HCC)     Elevated liver enzymes 1996    GERD (gastroesophageal reflux disease)     H/O echocardiogram 10/13/15    EF:60%. Mild mitral regurgitation. Moderate to severe tricuspid reguritation. Moderately dilated right atrium and right ventricle systolic pressure of 57 mmHg. Evidence of mild (Grade 1) diastolic dysfunction.  H/O echocardiogram 05/26/2017    Estimated EF 55%. The right atrium appears severly dialted. Right ventricular dilatation. Moderate pulmonary HTN with an estimated right ventricular systolic pressure of [id number]. Moderate to severe tricuspid regurg. Evidence of mild diastolic dysfunction is seen. Compared to the previous study of 10/13/15 the pt estimated right sided pressure has further declined from 57mmHg to 45mmHg.  H/O echocardiogram 05/2018    EF 60% Mild Mitral regurg Severly dilated right atrium and right ventricle with mildly reduced right ventricular systolic function. Severe tricuspid regurgitation. Moderate pulmonary hypertension with an estimated right ventricular systolic pressure of [id number] Evidence of moderate diastolic dysfunction is seen    Hiatal hernia     History of echocardiogram 9/27/14    Compared to the previous study of 4/18/13, the patients estimated RVSP has decreased from >75 mmHg to 35mmHg    History of stress test 10/26/15    Probably normal. EF >70%. Significant electrocardiographic  evidence of MI during EKG monitoring without significant associated arrhythmias. Max ST depression was 1.4 mm in lead ll.  Low risk for THERE IS A 1 LB WEIGHT GAIN THEN TAKE 2 TABLETS ON THOSE DAYS) 90 tablet 3    atorvastatin (LIPITOR) 40 MG tablet TAKE ONE TABLET BY MOUTH DAILY 90 tablet 3    calcium carbonate (TUMS) 500 MG chewable tablet Take 1 tablet by mouth daily as needed       hyoscyamine (LEVSIN/SL) 125 MCG sublingual tablet Place 1 tablet under the tongue every 4 hours as needed for Cramping 30 tablet 3    rOPINIRole (REQUIP) 0.5 MG tablet TAKE 1 TABLET BY MOUTH NIGHTLY 90 tablet 2    ferrous sulfate 325 (65 FE) MG tablet Take 1 tablet by mouth 2 times daily (with meals) (Patient taking differently: Take 325 mg by mouth 2 times daily ) 30 tablet 3     No current facility-administered medications for this visit. No Known Allergies    PHYSICAL EXAM:    /64   Ht 5' 3\" (1.6 m)   Wt 111 lb 9.6 oz (50.6 kg)   BMI 19.77 kg/m²   Wt Readings from Last 3 Encounters:   04/08/19 111 lb 9.6 oz (50.6 kg)   04/03/19 109 lb 14.4 oz (49.9 kg)   04/01/19 106 lb (48.1 kg)     BP Readings from Last 3 Encounters:   04/08/19 116/64   04/04/19 (!) 119/59   04/01/19 (!) 112/58       General Appearance: in no acute distress, well developed, well nourished. Ambulates with cane. Eyes: pupils equal, round reactive to light and accommodation. Ears: normal canal and TM's. Nose: nares patent, no lesions. Oral Cavity: mucosa moist.  Throat: clear. Neck/Thyroid: neck supple, full range of motion, no cervical lymphadenopathy, no thyromegaly or carotid bruits. Skin: warm and dry. No suspicious lesions. Heart: S1, S2 normal, no gallops. Regular irregular Rate: 80 2/6 systolic murmor LSB  Lungs: clear to auscultation bilaterally. Abdomen: bowel sounds present, soft, nontender, nondistended, no masses or organomegaly. Musculoskeletal: normal, full range of motion in knees and hips, no swelling or tenderness. Extremities: no cyanosis trace edema, ankles. Peripheral Pulses: 2+ throughout, symetric.   Neurologic: nonfocal, motor strength normal upper and lower extremities, sensory exam intact. Psych: normal affect, speech fluent. ASSESSMENT:   Diagnosis Orders   1. CHF (congestive heart failure), NYHA class I, acute on chronic, systolic (HCC)  Basic Metabolic Panel    Brain Natriuretic Peptide   2. Pulmonary hypertension ([address]     3. Diverticulosis of intestine without bleeding, unspecified intestinal tract location     4. Abdominal pain, LLQ     5. Chronic renal failure, stage 3 (moderate) (HCC)         PLAN:  I am pleased to see that her weight is trending back up. I explain that I am not sure what was causing her pain. I suspect that she was having spasm in the colo-rectal area. I don't see a need to restrict her diet. I will have her stop at the lab and get some blood work to check kidney and liver levels to make sure that they are still okay. I will see her back in 1 month. Orders Placed This Encounter   Procedures    Basic Metabolic Panel     Standing Status:   Future     Number of Occurrences:   1     Standing Expiration Date:   4/7/2020    Brain Natriuretic Peptide     Standing Status:   Future     Number of Occurrences:   1     Standing Expiration Date:   4/7/2020     Orders Placed This Encounter   Medications    levothyroxine (SYNTHROID) 100 MCG tablet     Sig: TAKE ONE TABLET BY MOUTH DAILY     Dispense:  90 tablet     Refill:  3     I, Dr. Mayra Kuo, personally performed the services described in this documentation as scribed by SOFYA Garza in my presence, and is both accurate and complete.

## 2019-04-08 NOTE — PATIENT INSTRUCTIONS
PLAN:  I am pleased to see that her weight is trending back up. I explain that I am not sure what was causing her pain. I suspect that she was having spasm in the colo-rectal area. I don't see a need to restrict her diet. I will have her stop at the lab and get some blood work to check kidney and liver levels to make sure that they are still okay. I will see her back in 1 month. SURVEY:    You may be receiving a survey from [employer] regarding your visit today. Please complete the survey to enable us to provide the highest quality of care to you and your family. If you cannot score us a very good on any question, please call the office to discuss how we could have made your experience a very good one. Thank you.

## 2019-04-09 NOTE — CARE COORDINATION
[facility] Transitions Initial Follow Up Call    Call within 2 business days of discharge: Yes    Patient: Christofer Ambrose Patient : 10/13/1927   MRN: [id number]  Reason for Admission: dehydration  Discharge Date: 19 RARS: Readmission Risk Score: 20      Last Discharge [facility]       Complaint Diagnosis Description Type Department Provider    19   Admission (Discharged) Bill Mederos MD               Facility: [facility] 24 Hour Call    Do you have all of your prescriptions and are they filled?:  Yes  Post Acute Services:  Home Health (Comment: [facility])  Care Transitions Interventions       Third and final attempt to contact Pt for transitions call. Contact information left to  requesting call back at the earliest convenience.     Luan Bowman RN BSN   Care Transitions Coordinator  [phone]       Follow Up  Future Appointments   Date Time Provider Katy Hassan   2019  9:45 AM Rola Clifton MD [facility]   2019 11:20 AM Pooja Hines MD [facility]       Luan Bowman RN

## 2019-04-30 ENCOUNTER — APPOINTMENT (OUTPATIENT)
Dept: GENERAL RADIOLOGY | Age: 84
DRG: 392 | End: 2019-04-30
Attending: FAMILY MEDICINE
Payer: MEDICARE

## 2019-04-30 ENCOUNTER — OFFICE VISIT (OUTPATIENT)
Dept: FAMILY MEDICINE CLINIC | Age: 84
End: 2019-04-30

## 2019-04-30 ENCOUNTER — HOSPITAL ENCOUNTER (INPATIENT)
Age: 84
LOS: 1 days | Discharge: HOME | DRG: 392 | End: 2019-05-01
Attending: FAMILY MEDICINE | Admitting: FAMILY MEDICINE
Payer: MEDICARE

## 2019-04-30 VITALS
HEIGHT: 63 IN | SYSTOLIC BLOOD PRESSURE: 130 MMHG | BODY MASS INDEX: 19.31 KG/M2 | WEIGHT: 109 LBS | TEMPERATURE: 97.9 F | DIASTOLIC BLOOD PRESSURE: 82 MMHG

## 2019-04-30 DIAGNOSIS — K57.92 DIVERTICULITIS: Primary | ICD-10-CM

## 2019-04-30 PROBLEM — K59.04 CHRONIC IDIOPATHIC CONSTIPATION: Status: ACTIVE | Noted: 2019-04-30

## 2019-04-30 LAB
ALBUMIN SERPL-MCNC: 3.8 G/DL (ref 3.5–5.2)
ALBUMIN/GLOBULIN RATIO: 1.1 (ref 1–2.5)
ALP BLD-CCNC: 129 U/L (ref 35–104)
ALT SERPL-CCNC: 12 U/L (ref 5–33)
ANION GAP SERPL CALCULATED.3IONS-SCNC: 14 MMOL/L (ref 9–17)
AST SERPL-CCNC: 20 U/L
BILIRUB SERPL-MCNC: 0.63 MG/DL (ref 0.3–1.2)
BUN BLDV-MCNC: 15 MG/DL (ref 8–23)
BUN/CREAT BLD: 16 (ref 9–20)
CALCIUM SERPL-MCNC: 9.7 MG/DL (ref 8.6–10.4)
CHLORIDE BLD-SCNC: 100 MMOL/L (ref 98–107)
CO2: 26 MMOL/L (ref 20–31)
CREAT SERPL-MCNC: 0.92 MG/DL (ref 0.5–0.9)
EKG ATRIAL RATE: 73 BPM
EKG Q-T INTERVAL: 406 MS
EKG QRS DURATION: 100 MS
EKG QTC CALCULATION (BAZETT): 450 MS
EKG R AXIS: 102 DEGREES
EKG T AXIS: -21 DEGREES
EKG VENTRICULAR RATE: 74 BPM
GFR AFRICAN AMERICAN: >60 ML/MIN
GFR NON-AFRICAN AMERICAN: 57 ML/MIN
GFR SERPL CREATININE-BSD FRML MDRD: ABNORMAL ML/MIN/{1.73_M2}
GFR SERPL CREATININE-BSD FRML MDRD: ABNORMAL ML/MIN/{1.73_M2}
GLUCOSE BLD-MCNC: 90 MG/DL (ref 70–99)
HCT VFR BLD CALC: 36.9 % (ref 36.3–47.1)
HEMOGLOBIN: 11.7 G/DL (ref 11.9–15.1)
MCH RBC QN AUTO: 31.8 PG (ref 25.2–33.5)
MCHC RBC AUTO-ENTMCNC: 31.7 G/DL (ref 28.4–34.8)
MCV RBC AUTO: 100.3 FL (ref 82.6–102.9)
NRBC AUTOMATED: 0 PER 100 WBC
PDW BLD-RTO: 15.6 % (ref 11.8–14.4)
PLATELET # BLD: 253 K/UL (ref 138–453)
PMV BLD AUTO: 10.8 FL (ref 8.1–13.5)
POTASSIUM SERPL-SCNC: 3.7 MMOL/L (ref 3.7–5.3)
RBC # BLD: 3.68 M/UL (ref 3.95–5.11)
SODIUM BLD-SCNC: 140 MMOL/L (ref 135–144)
TOTAL PROTEIN: 7.2 G/DL (ref 6.4–8.3)
WBC # BLD: 7.9 K/UL (ref 3.5–11.3)

## 2019-04-30 PROCEDURE — 1200000000 HC SEMI PRIVATE

## 2019-04-30 PROCEDURE — 97162 PT EVAL MOD COMPLEX 30 MIN: CPT

## 2019-04-30 PROCEDURE — 36415 COLL VENOUS BLD VENIPUNCTURE: CPT

## 2019-04-30 PROCEDURE — 6370000000 HC RX 637 (ALT 250 FOR IP): Performed by: FAMILY MEDICINE

## 2019-04-30 PROCEDURE — 6360000002 HC RX W HCPCS: Performed by: FAMILY MEDICINE

## 2019-04-30 PROCEDURE — 99222 1ST HOSP IP/OBS MODERATE 55: CPT | Performed by: FAMILY MEDICINE

## 2019-04-30 PROCEDURE — 2580000003 HC RX 258: Performed by: FAMILY MEDICINE

## 2019-04-30 PROCEDURE — 74022 RADEX COMPL AQT ABD SERIES: CPT

## 2019-04-30 PROCEDURE — 93005 ELECTROCARDIOGRAM TRACING: CPT

## 2019-04-30 PROCEDURE — 85027 COMPLETE CBC AUTOMATED: CPT

## 2019-04-30 PROCEDURE — 80053 COMPREHEN METABOLIC PANEL: CPT

## 2019-04-30 RX ORDER — SODIUM CHLORIDE 0.9 % (FLUSH) 0.9 %
10 SYRINGE (ML) INJECTION EVERY 12 HOURS SCHEDULED
Status: DISCONTINUED | OUTPATIENT
Start: 2019-04-30 | End: 2019-05-01 | Stop reason: HOSPADM

## 2019-04-30 RX ORDER — CIPROFLOXACIN 2 MG/ML
400 INJECTION, SOLUTION INTRAVENOUS EVERY 12 HOURS
Status: DISCONTINUED | OUTPATIENT
Start: 2019-04-30 | End: 2019-05-01 | Stop reason: HOSPADM

## 2019-04-30 RX ORDER — PANTOPRAZOLE SODIUM 40 MG/1
40 TABLET, DELAYED RELEASE ORAL
Status: DISCONTINUED | OUTPATIENT
Start: 2019-05-01 | End: 2019-05-01 | Stop reason: HOSPADM

## 2019-04-30 RX ORDER — AMLODIPINE BESYLATE 5 MG/1
2.5 TABLET ORAL DAILY
Status: DISCONTINUED | OUTPATIENT
Start: 2019-05-01 | End: 2019-05-01 | Stop reason: HOSPADM

## 2019-04-30 RX ORDER — SODIUM CHLORIDE 9 MG/ML
INJECTION, SOLUTION INTRAVENOUS CONTINUOUS
Status: DISCONTINUED | OUTPATIENT
Start: 2019-04-30 | End: 2019-05-01 | Stop reason: HOSPADM

## 2019-04-30 RX ORDER — SODIUM CHLORIDE 0.9 % (FLUSH) 0.9 %
10 SYRINGE (ML) INJECTION PRN
Status: DISCONTINUED | OUTPATIENT
Start: 2019-04-30 | End: 2019-05-01 | Stop reason: HOSPADM

## 2019-04-30 RX ORDER — CARVEDILOL 6.25 MG/1
6.25 TABLET ORAL 2 TIMES DAILY WITH MEALS
Status: DISCONTINUED | OUTPATIENT
Start: 2019-04-30 | End: 2019-05-01 | Stop reason: HOSPADM

## 2019-04-30 RX ORDER — MORPHINE SULFATE 2 MG/ML
2 INJECTION, SOLUTION INTRAMUSCULAR; INTRAVENOUS
Status: DISCONTINUED | OUTPATIENT
Start: 2019-04-30 | End: 2019-05-01 | Stop reason: HOSPADM

## 2019-04-30 RX ORDER — LEVOTHYROXINE SODIUM 0.1 MG/1
100 TABLET ORAL DAILY
Status: DISCONTINUED | OUTPATIENT
Start: 2019-05-01 | End: 2019-05-01 | Stop reason: HOSPADM

## 2019-04-30 RX ORDER — ACETAMINOPHEN 325 MG/1
650 TABLET ORAL EVERY 4 HOURS PRN
Status: DISCONTINUED | OUTPATIENT
Start: 2019-04-30 | End: 2019-05-01 | Stop reason: HOSPADM

## 2019-04-30 RX ORDER — ACETAMINOPHEN 500 MG
500 TABLET ORAL EVERY 6 HOURS PRN
Status: DISCONTINUED | OUTPATIENT
Start: 2019-04-30 | End: 2019-05-01 | Stop reason: HOSPADM

## 2019-04-30 RX ORDER — MORPHINE SULFATE 4 MG/ML
4 INJECTION, SOLUTION INTRAMUSCULAR; INTRAVENOUS
Status: DISCONTINUED | OUTPATIENT
Start: 2019-04-30 | End: 2019-05-01 | Stop reason: HOSPADM

## 2019-04-30 RX ORDER — POLYETHYLENE GLYCOL 3350 17 G/17G
17 POWDER, FOR SOLUTION ORAL 2 TIMES DAILY
Status: DISCONTINUED | OUTPATIENT
Start: 2019-04-30 | End: 2019-05-01 | Stop reason: HOSPADM

## 2019-04-30 RX ORDER — ATORVASTATIN CALCIUM 40 MG/1
40 TABLET, FILM COATED ORAL NIGHTLY
Status: DISCONTINUED | OUTPATIENT
Start: 2019-04-30 | End: 2019-05-01 | Stop reason: HOSPADM

## 2019-04-30 RX ORDER — ONDANSETRON 2 MG/ML
4 INJECTION INTRAMUSCULAR; INTRAVENOUS EVERY 6 HOURS PRN
Status: DISCONTINUED | OUTPATIENT
Start: 2019-04-30 | End: 2019-05-01 | Stop reason: HOSPADM

## 2019-04-30 RX ORDER — FAMOTIDINE 20 MG/1
20 TABLET, FILM COATED ORAL 2 TIMES DAILY
Status: DISCONTINUED | OUTPATIENT
Start: 2019-04-30 | End: 2019-04-30

## 2019-04-30 RX ORDER — FAMOTIDINE 20 MG/1
20 TABLET, FILM COATED ORAL NIGHTLY
Status: DISCONTINUED | OUTPATIENT
Start: 2019-05-01 | End: 2019-05-01 | Stop reason: HOSPADM

## 2019-04-30 RX ADMIN — MORPHINE SULFATE 1 MG: 2 INJECTION, SOLUTION INTRAMUSCULAR; INTRAVENOUS at 14:03

## 2019-04-30 RX ADMIN — POLYETHYLENE GLYCOL (3350) 17 G: 17 POWDER, FOR SOLUTION ORAL at 20:39

## 2019-04-30 RX ADMIN — CIPROFLOXACIN 400 MG: 2 INJECTION, SOLUTION INTRAVENOUS at 14:02

## 2019-04-30 RX ADMIN — CARVEDILOL 6.25 MG: 6.25 TABLET, FILM COATED ORAL at 17:20

## 2019-04-30 RX ADMIN — HYOSCYAMINE SULFATE 125 MCG: 0.12 TABLET ORAL; SUBLINGUAL at 13:15

## 2019-04-30 RX ADMIN — MORPHINE SULFATE 2 MG: 2 INJECTION, SOLUTION INTRAMUSCULAR; INTRAVENOUS at 16:28

## 2019-04-30 RX ADMIN — APIXABAN 2.5 MG: 2.5 TABLET, FILM COATED ORAL at 20:39

## 2019-04-30 RX ADMIN — MAGNESIUM HYDROXIDE 30 ML: 400 SUSPENSION ORAL at 17:22

## 2019-04-30 RX ADMIN — SODIUM CHLORIDE: 9 INJECTION, SOLUTION INTRAVENOUS at 12:45

## 2019-04-30 ASSESSMENT — PAIN DESCRIPTION - LOCATION
LOCATION: ABDOMEN
LOCATION: ABDOMEN

## 2019-04-30 ASSESSMENT — PAIN SCALES - GENERAL
PAINLEVEL_OUTOF10: 3
PAINLEVEL_OUTOF10: 6
PAINLEVEL_OUTOF10: 6
PAINLEVEL_OUTOF10: 0
PAINLEVEL_OUTOF10: 0

## 2019-04-30 ASSESSMENT — PAIN DESCRIPTION - PAIN TYPE
TYPE: ACUTE PAIN
TYPE: ACUTE PAIN

## 2019-04-30 ASSESSMENT — PAIN DESCRIPTION - ORIENTATION
ORIENTATION: LOWER
ORIENTATION: LOWER

## 2019-04-30 ASSESSMENT — PAIN DESCRIPTION - DESCRIPTORS
DESCRIPTORS: CRAMPING
DESCRIPTORS: CRAMPING

## 2019-04-30 ASSESSMENT — PAIN DESCRIPTION - FREQUENCY
FREQUENCY: INTERMITTENT
FREQUENCY: INTERMITTENT

## 2019-04-30 NOTE — PROGRESS NOTES
THOSE DAYS) 90 tablet 3    atorvastatin (LIPITOR) 40 MG tablet TAKE ONE TABLET BY MOUTH DAILY 90 tablet 3    calcium carbonate (TUMS) 500 MG chewable tablet Take 1 tablet by mouth daily as needed       polyethylene glycol (GLYCOLAX) packet Take 17 g by mouth daily 527 g 1    rOPINIRole (REQUIP) 0.5 MG tablet TAKE 1 TABLET BY MOUTH NIGHTLY 90 tablet 2     No current facility-administered medications for this visit. ROS:  Admits sharp lower abdominal pain, x4 days. Admits nausea. Admits poor appetite. Admits weakness. Admits bowels moving daily but not much output. EXAM:  /82   Temp 97.9 °F (36.6 °C)   Ht 5' 3\" (1.6 m)   Wt 109 lb (49.4 kg)   BMI 19.31 kg/m²   Wt Readings from Last 3 Encounters:   05/01/19 108 lb 14.4 oz (49.4 kg)   04/30/19 109 lb (49.4 kg)   04/08/19 111 lb 9.6 oz (50.6 kg)     BP Readings from Last 3 Encounters:   05/01/19 121/72   04/30/19 130/82   04/08/19 116/64     PHYSICAL EXAM:  General Appearance: in no acute distress, well developed, well nourished. Pain is too severe to lie completely supine, head of bed is elevated to 60 degree angle. Eyes: pupils equal, round reactive to light and accommodation. Oral Cavity: mucosa moist.  Neck/Thyroid: neck supple, full range of motion  Skin: warm and dry. No suspicious lesions. Heart: regular rate and rhythm. No murmurs. S1, S2 normal, no gallops. Lungs: dullness, right base  Abdomen: bowel sounds diminished, soft, nondistended, no masses or organomegaly. RLQ abdominal tenderness. Musculoskeletal: normal, full range of motion in knees and hips  Psych: normal affect, speech fluent. ASSESSMENT:   Diagnosis Orders   1. Diverticulitis           PLAN:  I suspect that she has diverticulitis. I will admit her to [facility], treat her with antibiotics and have Dr. Garcia Distance consult to see if he has any additional recommendations. No orders of the defined types were placed in this encounter.     No orders of the defined types were placed in this encounter. I, Dr. Avery Keen, personally performed the services described in this documentation as scribed by SOFYA Camp in my presence, and is both accurate and complete.

## 2019-04-30 NOTE — PROGRESS NOTES
[facility] office with abdominal pain and possible diverticulitis. Patient alert and oriented and with her daughter at bedside. Complains of pain at times that she describes as cramping. Patient is one assist with cane to restroom. Rayshawn Del Valle RN at bedside. Vitals, assessment, navigator, and orders reviewed with patient.

## 2019-04-30 NOTE — PLAN OF CARE
Problem: Pain:  Description  Pain management should include both nonpharmacologic and pharmacologic interventions. Goal: Pain level will decrease  Description  Pain level will decrease  Outcome: Ongoing  Note:   Pt is able to rate pain using a 0-10 scale. Medication, ice and repositioning reduce the pain if needed and pt is aware that it is available. Will continue to monitor. Morphine and tylenol given for pain as needed. Problem: Pain:  Description  Pain management should include both nonpharmacologic and pharmacologic interventions. Goal: Control of acute pain  Description  Control of acute pain  Outcome: Ongoing     Problem: Falls - Risk of:  Goal: Will remain free from falls  Description  Will remain free from falls  Outcome: Ongoing  Note:   Pt has no falls and is continuing to be monitored. Fall precautions remain intact. Bracelet on pt, star on, bed low and locked, alarm on, side rails up, call light and personal belongings within reach, and room clear and clean of clutter. Problem: Falls - Risk of:  Goal: Absence of physical injury  Description  Absence of physical injury  Outcome: Ongoing  Note:   No falls this shift.

## 2019-04-30 NOTE — H&P
Hospital Medicine  History and Physical    Patient:  Bianca Shaffer  MRN: [id number]    Chief Complaint:  Abdominal pain      History Obtained From:  patient, family member - daughter  PCP: lCarice Garcia MD    History of Present Illness: The patient is a 80 y.o. female who presents with acute worsening of lower abdominal and pelvic pain  She had been hospitalized 1 month ago for similar problem and was treated for diverticulitis though she had minimal evidence for inflammatory changes  She does have rather severe diverticulosis of the sigmoid colon  She has some anorexia but no vomiting   No fever  Her last stool was 2 days ago  She had been getting relief with levsin but inpast 2 days pain has become unbearable  She came to office today and she had lost weight  We admitted her for further eval and treatment    Past Medical History:        Diagnosis Date    Abnormal echocardiogram 5/15/12    EF >55%. mild diastolic dysfunction. RVSP 70 mmHg on echocardiogram. the right ventricle is moderately to severely enlarged with preserved function. Severe tricuspid regurgitation    Abnormal resting ECG findings 6/19/12    sinus bradycardia 55 beats per minute. Incomplete right bundle branch block. Evidence of left atrial enlargement. Abnormal ECG a    Allergic rhinitis 4/17/2017    Anxiety     ASHD (arteriosclerotic heart disease) 8/1997    CA 50-60%    ASHD (arteriosclerotic heart disease) 08/97    CA 24-82%    Diastolic congestive heart failure (HCC)     Elevated liver enzymes 1996    GERD (gastroesophageal reflux disease)     H/O echocardiogram 10/13/15    EF:60%. Mild mitral regurgitation. Moderate to severe tricuspid reguritation. Moderately dilated right atrium and right ventricle systolic pressure of 57 mmHg. Evidence of mild (Grade 1) diastolic dysfunction.  H/O echocardiogram 05/26/2017    Estimated EF 55%. The right atrium appears severly dialted. Right ventricular dilatation.  Moderate pulmonary HTN with an estimated right ventricular systolic pressure of 24PXJE. Moderate to severe tricuspid regurg. Evidence of mild diastolic dysfunction is seen. Compared to the previous study of 10/13/15 the pt estimated right sided pressure has further declined from 57mmHg to 45mmHg.  H/O echocardiogram 05/2018    EF 60% Mild Mitral regurg Severly dilated right atrium and right ventricle with mildly reduced right ventricular systolic function. Severe tricuspid regurgitation. Moderate pulmonary hypertension with an estimated right ventricular systolic pressure of 57LCTV Evidence of moderate diastolic dysfunction is seen    Hiatal hernia     History of echocardiogram 9/27/14    Compared to the previous study of 4/18/13, the patients estimated RVSP has decreased from >75 mmHg to 35mmHg    History of stress test 10/26/15    Probably normal. EF >70%. Significant electrocardiographic  evidence of MI during EKG monitoring without significant associated arrhythmias. Max ST depression was 1.4 mm in lead ll. Low risk for significant CAD.  Hyperglycemia 2008    Hyperlipidemia 1992    Hypertension 1991    Hyperthyroidism     Kidney stone     Menopause age 36    Osteoarthritis     lumbar    PAH (pulmonary artery hypertension) ([address] 7/5/2012    RHC: Mean PA 26, PCWP 12. Echo 6/12: RVSP 70    Pulmonary hypertension (HCC) 5/15/12    RVSP 70 mmHg on echocardiogram. the right ventricle is moderately to severely enlarged with preserved function. Severe tricuspid regurgitation.     Status post right heart catheterization 7/5/2012    RHC: Mean PA 26, PCWP 12.    Tricuspid regurgitation     MR 4/2002 ATB proph    Unspecified hypothyroidism 2/12/2015       Past Surgical History:        Procedure Laterality Date    CARDIAC SURGERY      CATARACT REMOVAL  10/2015    Dr. Negar Rivera Bilateral 10/2014    Cataracts Surgery       Medications Prior to Admission:    Prior to Admission medications    Medication Sig Start Date End Date Taking? Authorizing Provider   levothyroxine (SYNTHROID) 100 MCG tablet TAKE ONE TABLET BY MOUTH DAILY 4/8/19  Yes Afsaneh Rollins MD   hyoscyamine (LEVSIN/SL) 125 MCG sublingual tablet Place 1 tablet under the tongue every 4 hours as needed for Cramping 4/4/19  Yes Afsaneh Rollins MD   amLODIPine (NORVASC) 2.5 MG tablet Take 1 tablet by mouth daily 3/11/19  Yes Afsaneh Rollins MD   sildenafil (REVATIO) 20 MG tablet sildenafil (antihypertensive) 20 mg tablet  Take 1 tablet 2  times a day by oral route for 90 days. Yes Historical Provider, MD   apixaban (ELIQUIS) 2.5 MG TABS tablet Take 1 tablet by mouth 2 times daily 2/14/19  Yes Blank Stewart MD   omeprazole (PRILOSEC) 40 MG delayed release capsule TAKE ONE CAPSULE BY MOUTH DAILY 12/12/18  Yes Afsaneh Rollins MD   spironolactone (ALDACTONE) 25 MG tablet Take 1 tablet by mouth daily 10/2/18  Yes Afsaneh Rollins MD   acetaminophen (TYLENOL) 500 MG tablet Take 500 mg by mouth every 6 hours as needed for Pain or Fever   Yes Historical Provider, MD   furosemide (LASIX) 20 MG tablet TAKE 1 TABLET DAILY IF THERE IS A 1 LB WEIGHT GAIN THEN TAKE 2 TABLETS ON THOSE DAYS  Patient taking differently: Take 20 mg by mouth daily TAKE 1 TABLET DAILY IF THERE IS A 1 LB WEIGHT GAIN THEN TAKE 2 TABLETS ON THOSE DAYS 8/13/18  Yes Afsaneh Rollins MD   atorvastatin (LIPITOR) 40 MG tablet TAKE ONE TABLET BY MOUTH DAILY 11/17/17  Yes Afsaneh Rollins MD   rOPINIRole (REQUIP) 0.5 MG tablet TAKE 1 TABLET BY MOUTH NIGHTLY 8/8/16  Yes Afsaneh Rollins MD   ferrous sulfate 325 (65 FE) MG tablet Take 1 tablet by mouth 2 times daily (with meals)  Patient taking differently: Take 325 mg by mouth 2 times daily  11/4/15  Yes Afsaneh Rollins MD   calcium carbonate (TUMS) 500 MG chewable tablet Take 1 tablet by mouth daily as needed    Yes Historical Provider, MD   carvedilol (COREG) 6.25 MG tablet Take 1 tablet by mouth 2 times daily (with meals) 3/10/19   Afsaneh Rollins MD   Misc.  Devices (STEP N REST WALKER) MISC 1 Device by Does not apply route continuous 2/25/19   Tremaine Davalos MD       Allergies:  Patient has no known allergies. Social History:   TOBACCO:   reports that she has never smoked. She has never used smokeless tobacco.  ETOH:   reports that she does not drink alcohol. Family History:       Problem Relation Age of Onset    High Blood Pressure Mother     Heart Failure Mother     Other Mother         DJD    Stroke Father     Heart Disease Sister     High Blood Pressure Sister     High Cholesterol Sister     Cancer Sister         metastatic endometrial CA, Cause of death    Other Brother         CAD       Review of Systems:    Constitutional: positive for malaise and weight loss, negative for chills and fevers  Eyes: negative for visual disturbance  Ears, nose, mouth, throat, and face: negative  Respiratory: positive for dyspnea on exertion, negative for hemoptysis and pleurisy/chest pain  Cardiovascular: positive for fatigue, negative for exertional chest pressure/discomfort, near-syncope and orthopnea  Gastrointestinal: positive for abdominal pain and constipation, negative for melena  Genitourinary:positive for urinary incontinence, negative for dysuria and hematuria  Integument/breast: negative  Hematologic/lymphatic: negative  Musculoskeletal:positive for arthralgias and muscle weakness  Neurological: negative  Behavioral/Psych: negative  Endocrine: negative  Allergic/Immunologic: negative        Objective:    Vitals:   Vitals:    04/30/19 1214   BP: 130/84   Pulse: 90   Resp: 18   Temp: 98.2 °F (36.8 °C)   SpO2: 95%     Weight: 107 lb 11.2 oz (48.9 kg)   Height: 5' 3\" (160 cm)   -----------------------------------------------------------------    Exam:  GEN:    Awake, alert and oriented x 3. moderate acute distress. Well developed / well nourished. EYES:  EOMI, pupils equal   ENT:  Neck supple. No lymphadenopathy.   No carotid bruit  CVS:    reg rhythm  soft systolic m left

## 2019-04-30 NOTE — PROGRESS NOTES
gait; forward flexed posture  Distance: 20ftx2 with FWW and CGA with v/c's to slow down and navigate carefully with IV pole. Balance  Sitting - Static: Good  Sitting - Dynamic: Good  Standing - Static: Good  Standing - Dynamic: [address]  Times per week: 7  Times per day: Twice a day(daily on weekends)  Current Treatment Recommendations: Strengthening, Gait Training, Patient/Caregiver Education & Training, Stair training, Balance Training, Neuromuscular Re-education, Functional Mobility Training, Endurance Training, Transfer Training, Safety Education & Training  Safety Devices  Type of devices: All fall risk precautions in place, Left in bed, Gait belt, Call light within reach    Goals  Short term goals  Time Frame for Short term goals: 10 days  Short term goal 1: Pt. to be able to ambulate [id number] with LRAD and supervision assist with no LOB or fatigue in order to return to PLOF. Short term goal 2: Pt. to be able to ascend/descend 3 steps with HRx2 and SBA with no LOB in order to safely enter home. Short term goal 3: Pt. to be able to tolerate 20-30 min of ther ex/act in order to improve endurance and functional strength. Short term goal 4: Pt. to have good dynamic standing balance to decrease fall risk.        Therapy Time   Individual Concurrent Group Co-treatment   Time In 1330         Time Out 1347         Minutes 17                 Gera Son, PT, DPT

## 2019-04-30 NOTE — PATIENT INSTRUCTIONS
PLAN:  I suspect that she has diverticulitis. I will admit her to [facility], treat her with antibiotics and have Dr. Sami Coughlin consult to see if he has any additional recommendations. SURVEY:    You may be receiving a survey from [employer] regarding your visit today. Please complete the survey to enable us to provide the highest quality of care to you and your family. If you cannot score us a very good on any question, please call the office to discuss how we could have made your experience a very good one. Thank you.

## 2019-05-01 VITALS
SYSTOLIC BLOOD PRESSURE: 121 MMHG | DIASTOLIC BLOOD PRESSURE: 72 MMHG | WEIGHT: 108.9 LBS | OXYGEN SATURATION: 94 % | BODY MASS INDEX: 19.3 KG/M2 | HEIGHT: 63 IN | RESPIRATION RATE: 16 BRPM | HEART RATE: 79 BPM | TEMPERATURE: 98.7 F

## 2019-05-01 PROBLEM — E44.0 MODERATE MALNUTRITION (HCC): Status: ACTIVE | Noted: 2019-05-01

## 2019-05-01 PROCEDURE — 6360000002 HC RX W HCPCS: Performed by: FAMILY MEDICINE

## 2019-05-01 PROCEDURE — 99238 HOSP IP/OBS DSCHRG MGMT 30/<: CPT | Performed by: FAMILY MEDICINE

## 2019-05-01 PROCEDURE — 6370000000 HC RX 637 (ALT 250 FOR IP): Performed by: FAMILY MEDICINE

## 2019-05-01 RX ORDER — POLYETHYLENE GLYCOL 3350 17 G/17G
17 POWDER, FOR SOLUTION ORAL DAILY
Qty: 527 G | Refills: 1 | Status: SHIPPED | OUTPATIENT
Start: 2019-05-01 | End: 2019-05-31

## 2019-05-01 RX ADMIN — POLYETHYLENE GLYCOL (3350) 17 G: 17 POWDER, FOR SOLUTION ORAL at 08:01

## 2019-05-01 RX ADMIN — PANTOPRAZOLE SODIUM 40 MG: 40 TABLET, DELAYED RELEASE ORAL at 07:21

## 2019-05-01 RX ADMIN — CARVEDILOL 6.25 MG: 6.25 TABLET, FILM COATED ORAL at 08:01

## 2019-05-01 RX ADMIN — AMLODIPINE BESYLATE 2.5 MG: 5 TABLET ORAL at 08:01

## 2019-05-01 RX ADMIN — APIXABAN 2.5 MG: 2.5 TABLET, FILM COATED ORAL at 08:01

## 2019-05-01 RX ADMIN — LEVOTHYROXINE SODIUM 100 MCG: 100 TABLET ORAL at 07:21

## 2019-05-01 RX ADMIN — CIPROFLOXACIN 400 MG: 2 INJECTION, SOLUTION INTRAVENOUS at 02:54

## 2019-05-01 ASSESSMENT — PAIN SCALES - GENERAL: PAINLEVEL_OUTOF10: 0

## 2019-05-01 NOTE — DISCHARGE SUMMARY
Discharge Summary    Tamika Hawthorne  :  10/13/1927  MRN:  [id number]    Admit date:  2019      Discharge date: 2019     Admitting Physician:  Nan Ramires MD    Consultants:  none    Procedures: none    Complications: none    Discharge Condition: good    Hospital Course:   Tamika Hawthorne is a 80 y.o. female admitted with severe lower abdominal pain  This came on over 2-3 days prior to admission  She had similar pain last month and was admitted for several days and at that time felt to have some diverticultis but well established diverticulosis of the sigmoid colon which has bled in the past  More recently she has been having pain in the pelvic and LLQ region her stools have been regular  She came to my office in severe pain and we admitted her for further eval  Abdominal films showed gaseous distention of the sigmoid region   She had hard stool on rectal exam  Given miralax she had 2 stools overnight and her pain is completely resolved this am  I suspect it is entirely due to constipaton and severe diverticulosis of the sigmoid colon  We discussed need to keep stools very soft using miralax daily  We will also stop Fe for now    Exam:  GEN:  alert and oriented to person, place and time, well-developed and well-nourished, in no acute distress  EYES: No gross abnormalities.   NECK: normal,  no carotid bruits  PULM: rales present- right base  COR: premature beats and 2/6 early systolic medium pitched blowing murmur LLSB  ABD:  soft, non-tender, non-distended, normal bowel sounds, no masses or organomegaly  EXT:   no cyanosis, clubbing or edema present    NEURO: follows commands, [place], no deficits  SKIN:  no rashes or significant lesions    Significant Diagnostic Studies:   Lab Results   Component Value Date    WBC 7.9 2019    HGB 11.7 (L) 2019     2019       Lab Results   Component Value Date    BUN 15 2019    CREATININE 0.92 (H) 2019     2019    K 3.7 04/30/2019    CALCIUM 9.7 04/30/2019     04/30/2019    CO2 26 04/30/2019    LABGLOM 57 (L) 04/30/2019       Lab Results   Component Value Date    WBCUA None 04/02/2019    RBCUA None 04/02/2019    EPITHUA 0 TO 2 04/02/2019    LEUKOCYTESUR NEGATIVE 04/02/2019    SPECGRAV 1.020 04/02/2019    GLUCOSEU NEGATIVE 04/02/2019    KETUA 1+ (A) 04/02/2019    PROTEINU NEGATIVE 04/02/2019    HGBUR NEGATIVE 04/02/2019    CASTUA NOT REPORTED 03/29/2019    CRYSTUA NOT REPORTED 04/02/2019    BACTERIA TRACE (A) 04/02/2019    YEAST NOT REPORTED 04/02/2019       Xr Acute Abd Series Chest 1 Vw    Result Date: 4/30/2019  EXAMINATION: TWO XRAY VIEWS OF THE ABDOMEN AND SINGLE  XRAY VIEW OF THE CHEST 4/30/2019 2:00 pm COMPARISON: April 1, 2019 HISTORY: ORDERING SYSTEM PROVIDED HISTORY: abdom pain TECHNOLOGIST PROVIDED HISTORY: abdom pain FINDINGS: Increased right lower lobe consolidation. Slightly increased left lung base opacity. Small pleural effusions. Large hiatal hernia. Cardiomegaly. Mild pulmonary edema. Atherosclerotic calcification of the abdominal aorta. No abnormally dilated loops of small bowel. Mild gaseous distention of the sigmoid colon. Calcifications within the pelvis likely are phleboliths. Lumbar spine degenerative changes. Increased right lower lobe consolidation likely pneumonia. Small pleural effusions. Nonspecific bowel gas pattern. No abnormally dilated loops of small bowel. Mild nonspecific gaseous distention of the sigmoid colon. Discharge Diagnoses:    Principal Problem:    Diverticulosis of intestine without bleeding  Active Problems:    Hiatal hernia    ASHD (arteriosclerotic heart disease)    Tricuspid regurgitation    Hypothyroidism    Diastolic congestive heart failure (HCC)    Pulmonary hypertension (HCC)    Chronic atrial fibrillation (HCC)    Chronic idiopathic constipation  Resolved Problems:    * No resolved hospital problems.  *      Active Hospital Problems    Diagnosis Date Noted (antihypertensive) 20 mg tablet  Take 1 tablet 2  times a day by oral route for 90 days. spironolactone (ALDACTONE) 25 MG tablet  Take 1 tablet by mouth daily                 Patient Instructions: Activity: activity as tolerated  Diet: regular diet  Wound Care: none needed  Other:      Disposition:   Discharge to Home    Follow up:  Patient will be followed by Ana Laura Anand MD in 1-2 weeks    CORE MEASURES on Discharge (if applicable)  ACE/ARB in CHF: NA  Statin in MI: NA  ASA in MI: NA  Statin in CVA: NA  Antiplatelet in CVA: NA    Total time spent on discharge services: 30 minutes    Including the following activities:  Evaluation and Management of patient  Discussion with patient and/or surrogate about current care plan  Coordination with Case Management and/or   Coordination of care with Consultants (if applicable)   Coordination of care with Receiving Facility Physician (if applicable)  Completion of DME forms (if applicable)  Preparation of Discharge Summary  Preparation of Medication Reconciliation  Preparation of Discharge Prescriptions    Signed:   Ana Laura Anand MD  5/1/2019, 7:20 AM

## 2019-05-01 NOTE — PROGRESS NOTES
Met with Patient this a.m. She is a 80year old , white female, admitted with Diverticulitis. Patient is alert and oriented, pleasant and cooperative with this assessment. Patient lives alone in [facility]. She has good support from family and friends. Patient does not drive, uses [employer] and adult children also provide transportation if/as able. Daughter provides assistance with cleaning and household chores. Patient does her own cooking but states that she does most of her cooking now out of her crock pot. Patient uses walker, cane, shower chair and hand rails in the bathroom for assistance. No outside community resources or services currently utilized, other than [employer]. PCP is Dr. Mery Buckley. Cost of medications pose no financial barriers to Patient being discharged. Patient wishes to be discharged home when ready. Her son will provide the transportation and her daughter will also be here. Patient has a DNR-CCA in place. Is noted to have Advanced Directives in place as well. No additional needs or concerns identified by Patient at this time. LSW to monitor and assist with discharge planning as appropriate.     Eunice Carson [place]  5/1/2019

## 2019-05-01 NOTE — DISCHARGE INSTR - DIET

## 2019-05-01 NOTE — PROGRESS NOTES
Nutrition Assessment    Type and Reason for Visit: Initial, Positive Nutrition Screen(MST 2)    Nutrition Recommendations: Continue current diet. Encourage high fiber and fluids at home. Nutrition Assessment: Moderate malnutrition RT insufficient energy consumption AEB wt loss of 11.5% in 3 months, mild fat loss, and mild muscle mass loss. Pt admitted with diverticulosis and constipation. Pt meets criteria for moderate malnutrition due to weight loss and fat/muscle loss. Wt from 3/29/19 is likely an error as it outisde of her wt history range. She is eating 2 meals a day and a small dinner, typically a little low in fiber and fluids. She does her own cooking and eats until she is full. Pt said she would switch to whole grain products, increase fruits and vegetables, and drink more fluids. Gave high fiber diet handout. Pt will be discharged today as constipation is resolved. Malnutrition Assessment:  · Malnutrition Status: Meets the criteria for moderate malnutrition  · Context: Acute illness or injury  · Findings of the 6 clinical characteristics of malnutrition (Minimum of 2 out of 6 clinical characteristics is required to make the diagnosis of moderate or severe Protein Calorie Malnutrition based on AND/ASPEN Guidelines):  1. Energy Intake-Unable to assess, Unable to assess    2. Weight Loss-10% loss or greater, in 3 months  3. Fat Loss-Mild subcutaneous fat loss, Orbital  4. Muscle Loss-Mild muscle mass loss, Temples (temporalis muscle), Clavicles (pectoralis and deltoids)  5. Fluid Accumulation-Mild fluid accumulation, Extremities  6.  Strength-Not measured    Nutrition Risk Level: Moderate    Nutrient Needs:  · Estimated Daily Total Kcal: 1822-7187(2-51GHTR/PZ)  · Estimated Daily Protein (g): 59-69(1.2-1.4g/kg)  · Estimated Daily Total Fluid (ml/day): 9467-6280(1mL/kcal)    Nutrition Diagnosis:   · Problem:  Moderate malnutrition  · Etiology: related to Insufficient energy/nutrient consumption  Signs and symptoms:  as evidenced by Weight loss greater than or equal to 7.5% in 3 months, Mild loss of subcutaneous fat, Mild muscle loss    Objective Information:  · Nutrition-Focused Physical Findings: Dentures, trace L/RLE edema  · Wound Type: None  · Current Nutrition Therapies:  · Oral Diet Orders: Clear Liquid   · Oral Diet intake: Unable to assess  · Oral Nutrition Supplement (ONS) Orders: None  · Anthropometric Measures:  · Ht: 5' 3\" (160 cm)   · Current Body Wt: 108 lb 14.4 oz (49.4 kg)  · Admission Body Wt: 107 lb 11.2 oz (48.9 kg)  · Usual Body Wt: 120 lb (54.4 kg)  · % Weight Change:  ,  11.5% wt loss since 1/7/19  · Ideal Body Wt: 115 lb (52.2 kg), % Ideal Body 94%  · BMI Classification: BMI 18.5 - 24.9 Normal Weight    Nutrition Interventions:   Continue current diet  Continued Inpatient Monitoring, Coordination of Care, Education Completed    Nutrition Evaluation:   · Evaluation: Goals set   · Goals: PO >75% of meals    · Monitoring: Meal Intake, Diet Tolerance, Weight    Wt Readings from Last 10 Encounters:   05/01/19 108 lb 14.4 oz (49.4 kg)   04/30/19 109 lb (49.4 kg)   04/08/19 111 lb 9.6 oz (50.6 kg)   04/03/19 109 lb 14.4 oz (49.9 kg)   04/01/19 106 lb (48.1 kg)   03/29/19 153 lb (69.4 kg)   03/19/19 113 lb (51.3 kg)   03/10/19 117 lb 4.8 oz (53.2 kg)   01/07/19 122 lb (55.3 kg)   11/27/18 125 lb 6.4 oz (56.9 kg)     Recent Labs     04/30/19  1230      K 3.7      CO2 26   BUN 15   CREATININE 0.92*   GLUCOSE 90   ALT 12   ALKPHOS 129*   GFR                 Lab Results   Component Value Date    LABALBU 3.8 04/30/2019    LABALBU 4.1 05/01/2012      Electronically signed by Tray Romeo on 5/1/19 at 7:38 AM    Contact Number: [phone]

## 2019-05-01 NOTE — FLOWSHEET NOTE
Reviewed discharge instructions with patient. Voices understanding.  States her son will pick her up at 1 pm

## 2019-05-02 ENCOUNTER — CARE COORDINATION (OUTPATIENT)
Dept: CASE MANAGEMENT | Age: 84
End: 2019-05-02

## 2019-05-02 NOTE — CARE COORDINATION
[facility] Transitions Initial Follow Up Call    Call within 2 business days of discharge: Yes    Patient: Maria Guadalupe Staley Patient : 10/13/1927   MRN: [id number]  Reason for Admission: Diverticulosis  Discharge Date: 19 RARS: Readmission Risk Score: 20      Last Discharge [facility]       Complaint Diagnosis Description Type Department Provider    19   Admission (Discharged) Sue Miranda MD           Spoke with: Son [address]: [place]    Non-face-to-face services provided:  Obtained and reviewed discharge summary and/or continuity of care documents    Care Transitions 24 Hour Call    Do you have all of your prescriptions and are they filled?:  Yes  Post Acute Services:  Home Health (Comment: [facility])  Care Transitions Interventions       CTC unable to reach patient for initial transitions call. Contact number rings once and goes directly to . CTC spoke briefly to pt's son Jose Beltran who stated he is at work right now. Stated pt has history of bowel issues with diverticulitis. Stated he thinks she is taking stool softener daily now. Per discharge instructions, pt to take Miralax daily and discontinue iron supplement. Reviewed upcoming PCP appt on . Jose Beltran stated his sister Vivian Suarez will take her to appt and suggested CTC attempt to contact Vivian Suarez to review discharge instructions. CTC left  to Vivian Suarez requesting call back.     Lili Yin, RN BSN   Care Transitions Coordinator  [phone]       Follow Up  Future Appointments   Date Time Provider Katy Hassan   2019  9:45 AM MD Clau Tucker [facility]   2019 11:20 AM Shreya Finley MD [facility]       Lili Yin, RN

## 2019-05-03 ENCOUNTER — CARE COORDINATION (OUTPATIENT)
Dept: CASE MANAGEMENT | Age: 84
End: 2019-05-03

## 2019-05-03 NOTE — CARE COORDINATION
[facility] Transitions Initial Follow Up Call    Call within 2 business days of discharge: Yes    Patient: Darron Caban Patient : 10/13/1927   MRN: [id number]  Reason for Admission: Diverticulosis  Discharge Date: 19 RARS: Readmission Risk Score: 20      Last Discharge [address] [address]       Complaint Diagnosis Description Type Department Provider    19   Admission (Discharged) Amanda Vargas MD           Spoke with: Second attempt to contact patient for transition call. Unable to reach, left voicemail with contact information for patient to call back.     Facility: [facility] Transitions 24 Hour Call    Do you have all of your prescriptions and are they filled?:  Yes  Care Transitions Interventions         Follow Up  Future Appointments   Date Time Provider Katy Hassan   2019  9:45 AM Kamila Glasgow MD [facility]   2019 11:20 AM Tita Terry MD [facility]       Jaleesa Echeverria RN BSN  Care Transitions Coordinator

## 2019-05-04 PROBLEM — E86.0 DEHYDRATION: Status: RESOLVED | Noted: 2017-10-27 | Resolved: 2019-05-04

## 2019-05-06 ENCOUNTER — OFFICE VISIT (OUTPATIENT)
Dept: FAMILY MEDICINE CLINIC | Age: 84
End: 2019-05-06
Payer: MEDICARE

## 2019-05-06 VITALS
DIASTOLIC BLOOD PRESSURE: 62 MMHG | WEIGHT: 114.6 LBS | HEIGHT: 63 IN | BODY MASS INDEX: 20.3 KG/M2 | SYSTOLIC BLOOD PRESSURE: 118 MMHG

## 2019-05-06 DIAGNOSIS — K59.00 CONSTIPATION, UNSPECIFIED CONSTIPATION TYPE: ICD-10-CM

## 2019-05-06 DIAGNOSIS — I10 ESSENTIAL HYPERTENSION: ICD-10-CM

## 2019-05-06 DIAGNOSIS — K57.90 DIVERTICULOSIS OF INTESTINE WITHOUT BLEEDING, UNSPECIFIED INTESTINAL TRACT LOCATION: Primary | ICD-10-CM

## 2019-05-06 PROCEDURE — 1111F DSCHRG MED/CURRENT MED MERGE: CPT | Performed by: FAMILY MEDICINE

## 2019-05-06 PROCEDURE — G8598 ASA/ANTIPLAT THER USED: HCPCS | Performed by: FAMILY MEDICINE

## 2019-05-06 PROCEDURE — G8427 DOCREV CUR MEDS BY ELIG CLIN: HCPCS | Performed by: FAMILY MEDICINE

## 2019-05-06 PROCEDURE — 1123F ACP DISCUSS/DSCN MKR DOCD: CPT | Performed by: FAMILY MEDICINE

## 2019-05-06 PROCEDURE — 1036F TOBACCO NON-USER: CPT | Performed by: FAMILY MEDICINE

## 2019-05-06 PROCEDURE — 4040F PNEUMOC VAC/ADMIN/RCVD: CPT | Performed by: FAMILY MEDICINE

## 2019-05-06 PROCEDURE — 99214 OFFICE O/P EST MOD 30 MIN: CPT | Performed by: FAMILY MEDICINE

## 2019-05-06 PROCEDURE — 1090F PRES/ABSN URINE INCON ASSESS: CPT | Performed by: FAMILY MEDICINE

## 2019-05-06 PROCEDURE — G8420 CALC BMI NORM PARAMETERS: HCPCS | Performed by: FAMILY MEDICINE

## 2019-05-06 NOTE — PROGRESS NOTES
I, SOFYA Jarrett, am scribing for and in the presence of Dr. Adenike Churchill. 05/06/2019/AM/9:56am    [address] [address]  [id number]  Dept: [phone]    Juana Tobar is a 80 y.o. female here for Other (1 month follow up to her CHF)      HPI:  Gina Stringer had blood work done on 4/8/19 and was found to have low potassium and her kidney function was good and she was advised to increase her fruits in her diet. She eats fruit with her lunch or dinner and sometimes snacks on them also. She currently walking around her kitchen table and she is starting to walk to get her mail. She is started to take her trash out. Currently no complaints of any abdominal pain. She is accompanied today by her daughter. Prior to Admission medications    Medication Sig Start Date End Date Taking? Authorizing Provider   polyethylene glycol [facility]) packet Take 17 g by mouth daily 5/1/19 5/31/19 Yes Adenike Churchill MD   levothyroxine (SYNTHROID) 100 MCG tablet TAKE ONE TABLET BY MOUTH DAILY 4/8/19  Yes Adenike Churchill MD   carvedilol (COREG) 6.25 MG tablet Take 1 tablet by mouth 2 times daily (with meals) 3/10/19  Yes Adenike Churchill MD   amLODIPine (NORVASC) 2.5 MG tablet Take 1 tablet by mouth daily 3/11/19  Yes Adenike Churchill MD   sildenafil (REVATIO) 20 MG tablet sildenafil (antihypertensive) 20 mg tablet  Take 1 tablet 2  times a day by oral route for 90 days. Yes Historical Provider, MD   Misc.  Devices (STEP N REST WALKER) MISC 1 Device by Does not apply route continuous 2/25/19  Yes Adenike Churchill MD   apixaban Jd Merry) 2.5 MG TABS tablet Take 1 tablet by mouth 2 times daily 2/14/19  Yes Carey Patterson MD   omeprazole (PRILOSEC) 40 MG delayed release capsule TAKE ONE CAPSULE BY MOUTH DAILY 12/12/18  Yes Adenike Churchill MD   spironolactone (ALDACTONE) 25 MG tablet Take 1 tablet by mouth daily 10/2/18  Yes Adenike Churchill MD   acetaminophen (TYLENOL) 500 MG Family History   Problem Relation Age of Onset    High Blood Pressure Mother     Heart Failure Mother     Other Mother         DJD    Stroke Father     Heart Disease Sister     High Blood Pressure Sister     High Cholesterol Sister     Cancer Sister         metastatic endometrial CA, Cause of death    Other Brother         CAD       Past Medical History:   Diagnosis Date    Abnormal echocardiogram 5/15/12    EF >55%. mild diastolic dysfunction. RVSP 70 mmHg on echocardiogram. the right ventricle is moderately to severely enlarged with preserved function. Severe tricuspid regurgitation    Abnormal resting ECG findings 6/19/12    sinus bradycardia 55 beats per minute. Incomplete right bundle branch block. Evidence of left atrial enlargement. Abnormal ECG a    Allergic rhinitis 4/17/2017    Anxiety     ASHD (arteriosclerotic heart disease) 8/1997    CA 50-60%    ASHD (arteriosclerotic heart disease) 08/97    CA 27-57%    Diastolic congestive heart failure (HCC)     Elevated liver enzymes 1996    GERD (gastroesophageal reflux disease)     H/O echocardiogram 10/13/15    EF:60%. Mild mitral regurgitation. Moderate to severe tricuspid reguritation. Moderately dilated right atrium and right ventricle systolic pressure of 57 mmHg. Evidence of mild (Grade 1) diastolic dysfunction.  H/O echocardiogram 05/26/2017    Estimated EF 55%. The right atrium appears severly dialted. Right ventricular dilatation. Moderate pulmonary HTN with an estimated right ventricular systolic pressure of 74SVMP. Moderate to severe tricuspid regurg. Evidence of mild diastolic dysfunction is seen. Compared to the previous study of 10/13/15 the pt estimated right sided pressure has further declined from 57mmHg to 45mmHg.  H/O echocardiogram 05/2018    EF 60% Mild Mitral regurg Severly dilated right atrium and right ventricle with mildly reduced right ventricular systolic function. Severe tricuspid regurgitation. Moderate pulmonary hypertension with an estimated right ventricular systolic pressure of [id number] Evidence of moderate diastolic dysfunction is seen    Hiatal hernia     History of echocardiogram 9/27/14    Compared to the previous study of 4/18/13, the patients estimated RVSP has decreased from >75 mmHg to 35mmHg    History of stress test 10/26/15    Probably normal. EF >70%. Significant electrocardiographic  evidence of MI during EKG monitoring without significant associated arrhythmias. Max ST depression was 1.4 mm in lead ll. Low risk for significant CAD.  Hyperglycemia 2008    Hyperlipidemia 1992    Hypertension 1991    Hyperthyroidism     Kidney stone     Menopause age 36    Osteoarthritis     lumbar    PAH (pulmonary artery hypertension) ([facility] [address] 7/5/2012    RHC: Mean PA 26, PCWP 12. Echo 6/12: RVSP 70    Pulmonary hypertension (HCC) 5/15/12    RVSP 70 mmHg on echocardiogram. the right ventricle is moderately to severely enlarged with preserved function. Severe tricuspid regurgitation.  Status post right heart catheterization 7/5/2012    RHC: Mean PA 26, PCWP 12.    Tricuspid regurgitation     MR 4/2002 ATB proph    Unspecified hypothyroidism 2/12/2015      Social History     Tobacco Use    Smoking status: Never Smoker    Smokeless tobacco: Never Used   Substance Use Topics    Alcohol use: No      Current Outpatient Medications   Medication Sig Dispense Refill    polyethylene glycol (GLYCOLAX) packet Take 17 g by mouth daily 527 g 1    levothyroxine (SYNTHROID) 100 MCG tablet TAKE ONE TABLET BY MOUTH DAILY 90 tablet 3    carvedilol (COREG) 6.25 MG tablet Take 1 tablet by mouth 2 times daily (with meals) 60 tablet 3    amLODIPine (NORVASC) 2.5 MG tablet Take 1 tablet by mouth daily 30 tablet 3    sildenafil (REVATIO) 20 MG tablet sildenafil (antihypertensive) 20 mg tablet  Take 1 tablet 2  times a day by oral route for 90 days.  Misc.  Devices (STEP N REST WALKER) MISC 1 Device by Does not apply route continuous 1 each 0    apixaban (ELIQUIS) 2.5 MG TABS tablet Take 1 tablet by mouth 2 times daily 180 tablet 3    omeprazole (PRILOSEC) 40 MG delayed release capsule TAKE ONE CAPSULE BY MOUTH DAILY 90 capsule 3    spironolactone (ALDACTONE) 25 MG tablet Take 1 tablet by mouth daily 90 tablet 3    acetaminophen (TYLENOL) 500 MG tablet Take 500 mg by mouth every 6 hours as needed for Pain or Fever      furosemide (LASIX) 20 MG tablet TAKE 1 TABLET DAILY IF THERE IS A 1 LB WEIGHT GAIN THEN TAKE 2 TABLETS ON THOSE DAYS (Patient taking differently: Take 20 mg by mouth daily TAKE 1 TABLET DAILY IF THERE IS A 1 LB WEIGHT GAIN THEN TAKE 2 TABLETS ON THOSE DAYS) 90 tablet 3    atorvastatin (LIPITOR) 40 MG tablet TAKE ONE TABLET BY MOUTH DAILY 90 tablet 3    rOPINIRole (REQUIP) 0.5 MG tablet TAKE 1 TABLET BY MOUTH NIGHTLY 90 tablet 2    calcium carbonate (TUMS) 500 MG chewable tablet Take 1 tablet by mouth daily as needed        No current facility-administered medications for this visit. No Known Allergies    PHYSICAL EXAM:    /62 (Site: Left Upper Arm, Position: Sitting, Cuff Size: Medium Adult)   Ht 5' 3\" (1.6 m)   Wt 114 lb 9.6 oz (52 kg)   BMI 20.30 kg/m²   Wt Readings from Last 3 Encounters:   05/06/19 114 lb 9.6 oz (52 kg)   05/01/19 108 lb 14.4 oz (49.4 kg)   04/30/19 109 lb (49.4 kg)     BP Readings from Last 3 Encounters:   05/06/19 118/62   05/01/19 121/72   04/30/19 130/82       General Appearance: in no acute distress, well developed, well nourished. Eyes: pupils equal, round reactive to light and accommodation. Ears: normal canal and TM's. Nose: nares patent, no lesions. Oral Cavity: mucosa moist.  Throat: clear. Neck/Thyroid: neck supple, full range of motion, no cervical lymphadenopathy, no thyromegaly or carotid bruits. Skin: warm and dry. No suspicious lesions. Heart: regular rate and rhythm. No murmurs. S1, S2 normal, no gallops.   Irregular, 2/6 systolic murmur apex Rate 80  Lungs: clear to auscultation bilaterally. Abdomen: bowel sounds present, soft, nontender, nondistended, no masses or organomegaly. Musculoskeletal: normal, full range of motion in knees and hips, no swelling or tenderness. Extremities: no cyanosis or edema. Trace edema at the ankles  Peripheral Pulses: 2+ throughout, symetric. Neurologic: nonfocal, motor strength normal upper and lower extremities, sensory exam intact. Psych: normal affect, speech fluent. ASSESSMENT:   Diagnosis Orders   1. Diverticulosis of intestine without bleeding, unspecified intestinal tract location     2. Constipation, unspecified constipation type     3. Essential hypertension         PLAN:  I am happy that she is having 2 soft bowl movements a day. Continue taking miralax and continue eating the fruits. If she has more then 2 BM's a day she needs to call the office and I will give her new instructions. Any other issues or concerns they need to call me and no one else. She is feeling really good. She did have an increase in weight which is a good sign. There is no reason she can't go get her mail, she needs to use her walker to do this. I would like to see her back in 2 months unless she needs to see us sooner. No orders of the defined types were placed in this encounter. No orders of the defined types were placed in this encounter.

## 2019-05-22 ENCOUNTER — TELEPHONE (OUTPATIENT)
Dept: FAMILY MEDICINE CLINIC | Age: 84
End: 2019-05-22

## 2019-05-22 DIAGNOSIS — R10.32 ABDOMINAL PAIN, LLQ: Primary | ICD-10-CM

## 2019-05-22 RX ORDER — ROPINIROLE 0.5 MG/1
TABLET, FILM COATED ORAL
Status: ON HOLD | COMMUNITY
End: 2019-11-04

## 2019-05-22 RX ORDER — HYDROCODONE BITARTRATE AND ACETAMINOPHEN 5; 325 MG/1; MG/1
1 TABLET ORAL EVERY 8 HOURS PRN
Qty: 30 TABLET | Refills: 0 | Status: SHIPPED | OUTPATIENT
Start: 2019-05-22 | End: 2019-06-21

## 2019-05-22 NOTE — TELEPHONE ENCOUNTER
Araceli Vance called for Yuval, she is in excruciating pain and is out of [place].  She is not able to pass any gas and feels miserable. Per Dr. Drew Wilson, double up on Miralax, give a dulcolax suppository (if no fever), and ok to fill Willow, to Alanda Boas. Araceli Vance will call Dr. Drew Wilson if she does have a fever.

## 2019-05-23 ENCOUNTER — TELEPHONE (OUTPATIENT)
Dept: FAMILY MEDICINE CLINIC | Age: 84
End: 2019-05-23

## 2019-05-29 ENCOUNTER — OFFICE VISIT (OUTPATIENT)
Dept: FAMILY MEDICINE CLINIC | Age: 84
End: 2019-05-29
Payer: MEDICARE

## 2019-05-29 VITALS
DIASTOLIC BLOOD PRESSURE: 70 MMHG | BODY MASS INDEX: 19.31 KG/M2 | HEIGHT: 63 IN | SYSTOLIC BLOOD PRESSURE: 120 MMHG | WEIGHT: 109 LBS

## 2019-05-29 DIAGNOSIS — R10.31 ABDOMINAL PAIN, RLQ: ICD-10-CM

## 2019-05-29 DIAGNOSIS — R10.32 ABDOMINAL PAIN, LLQ: ICD-10-CM

## 2019-05-29 DIAGNOSIS — K59.00 CONSTIPATION, UNSPECIFIED CONSTIPATION TYPE: ICD-10-CM

## 2019-05-29 DIAGNOSIS — K57.90 DIVERTICULOSIS OF INTESTINE WITHOUT BLEEDING, UNSPECIFIED INTESTINAL TRACT LOCATION: Primary | ICD-10-CM

## 2019-05-29 LAB
BACTERIA URINE, POC: 0
BILIRUBIN URINE: 0 MG/DL
BLOOD, URINE: NEGATIVE
CASTS URINE, POC: 0
CLARITY: CLEAR
COLOR: NORMAL
CRYSTALS URINE, POC: 0
EPI CELLS URINE, POC: NORMAL
GLUCOSE URINE: NEGATIVE
KETONES, URINE: NEGATIVE
LEUKOCYTE EST, POC: NEGATIVE
NITRITE, URINE: NEGATIVE
PH UA: 5 (ref 4.5–8)
PROTEIN UA: NEGATIVE
RBC URINE, POC: 0
SPECIFIC GRAVITY UA: 1.02 (ref 1–1.03)
UROBILINOGEN, URINE: NORMAL
WBC URINE, POC: 0
YEAST URINE, POC: 0

## 2019-05-29 PROCEDURE — 1123F ACP DISCUSS/DSCN MKR DOCD: CPT | Performed by: FAMILY MEDICINE

## 2019-05-29 PROCEDURE — 81000 URINALYSIS NONAUTO W/SCOPE: CPT | Performed by: FAMILY MEDICINE

## 2019-05-29 PROCEDURE — G8427 DOCREV CUR MEDS BY ELIG CLIN: HCPCS | Performed by: FAMILY MEDICINE

## 2019-05-29 PROCEDURE — 1111F DSCHRG MED/CURRENT MED MERGE: CPT | Performed by: FAMILY MEDICINE

## 2019-05-29 PROCEDURE — 4040F PNEUMOC VAC/ADMIN/RCVD: CPT | Performed by: FAMILY MEDICINE

## 2019-05-29 PROCEDURE — 99213 OFFICE O/P EST LOW 20 MIN: CPT | Performed by: FAMILY MEDICINE

## 2019-05-29 PROCEDURE — 1090F PRES/ABSN URINE INCON ASSESS: CPT | Performed by: FAMILY MEDICINE

## 2019-05-29 PROCEDURE — G8420 CALC BMI NORM PARAMETERS: HCPCS | Performed by: FAMILY MEDICINE

## 2019-05-29 PROCEDURE — 1036F TOBACCO NON-USER: CPT | Performed by: FAMILY MEDICINE

## 2019-05-29 PROCEDURE — G8598 ASA/ANTIPLAT THER USED: HCPCS | Performed by: FAMILY MEDICINE

## 2019-05-29 RX ORDER — PREDNISONE 10 MG/1
30 TABLET ORAL DAILY
Qty: 30 TABLET | Refills: 0 | Status: SHIPPED | OUTPATIENT
Start: 2019-05-29 | End: 2019-06-08

## 2019-05-29 RX ORDER — PREDNISONE 10 MG/1
TABLET ORAL
Qty: 30 TABLET | Refills: 0 | Status: SHIPPED | OUTPATIENT
Start: 2019-05-29 | End: 2019-06-17 | Stop reason: ALTCHOICE

## 2019-05-29 NOTE — PATIENT INSTRUCTIONS
PLAN:  In reviewing her admissions, it appears that these episodes have been occurring monthly for the last three months. It is possible that we are dealing with inflammation that is not infection. I would like to give a trial of prednisone 10 mg 30 mg daily x5 days, 20 mg daily x5 days, then 10 mg daily x5 days. She should continue with her current miralax regimen. I would also like to get a urine specimen to be sure there is not infection. I would like a call in 2 days with an update on how she is feeling. If she is not feeling better, I will consider admitting her to the hospital.   SURVEY:    You may be receiving a survey from [employer] regarding your visit today. Please complete the survey to enable us to provide the highest quality of care to you and your family. If you cannot score us a very good on any question, please call the office to discuss how we could have made your experience a very good one. Thank you.

## 2019-05-29 NOTE — PROGRESS NOTES
Device by Does not apply route continuous 1 each 0    apixaban (ELIQUIS) 2.5 MG TABS tablet Take 1 tablet by mouth 2 times daily 180 tablet 3    omeprazole (PRILOSEC) 40 MG delayed release capsule TAKE ONE CAPSULE BY MOUTH DAILY 90 capsule 3    spironolactone (ALDACTONE) 25 MG tablet Take 1 tablet by mouth daily 90 tablet 3    acetaminophen (TYLENOL) 500 MG tablet Take 500 mg by mouth every 6 hours as needed for Pain or Fever      furosemide (LASIX) 20 MG tablet TAKE 1 TABLET DAILY IF THERE IS A 1 LB WEIGHT GAIN THEN TAKE 2 TABLETS ON THOSE DAYS (Patient taking differently: Take 20 mg by mouth daily TAKE 1 TABLET DAILY IF THERE IS A 1 LB WEIGHT GAIN THEN TAKE 2 TABLETS ON THOSE DAYS) 90 tablet 3    atorvastatin (LIPITOR) 40 MG tablet TAKE ONE TABLET BY MOUTH DAILY 90 tablet 3    calcium carbonate (TUMS) 500 MG chewable tablet Take 1 tablet by mouth daily as needed        No current facility-administered medications for this visit. ROS:  Admits difficulties passing gas this morning. Denies bloating. Admits intermittent episodes of severe lower abdominal cramping and pain. Denies vomiting. Denies constipation. Admits intermittent nausea with pain. Denies bloody stools. Denies fever. EXAM:  /70   Ht 5' 3\" (1.6 m)   Wt 109 lb (49.4 kg)   BMI 19.31 kg/m²   Wt Readings from Last 3 Encounters:   05/29/19 109 lb (49.4 kg)   05/06/19 114 lb 9.6 oz (52 kg)   05/01/19 108 lb 14.4 oz (49.4 kg)     BP Readings from Last 3 Encounters:   05/29/19 120/70   05/06/19 118/62   05/01/19 121/72     PHYSICAL EXAM:  General Appearance: in no acute distress, well developed, well nourished. Eyes: pupils equal, round reactive to light and accommodation. Oral Cavity: mucosa moist.  Neck/Thyroid: neck supple, full range of motion  Skin: warm and dry. No suspicious lesions. Heart:  S1, S2 normal, no gallops. Rate: 75 irregular regular 2/6 systolic murmor at apex.    Lungs: clear to auscultation bilaterally. Abdomen: bowel sounds present, soft, nondistended, no masses or organomegaly. No tympany. Tender lower abdomen. Musculoskeletal: normal, full range of motion in knees and hips  Psych: normal affect, speech fluent. ASSESSMENT:   Diagnosis Orders   1. Diverticulosis of intestine without bleeding, unspecified intestinal tract location  POC URINE with Microscopic   2. Abdominal pain, RLQ  POC URINE with Microscopic   3. Abdominal pain, LLQ  POC URINE with Microscopic   4. Constipation, unspecified constipation type           PLAN:  In reviewing her admissions, it appears that these episodes have been occurring monthly for the last three months. It is possible that we are dealing with inflammation that is not infection. I would like to give a trial of prednisone 10 mg 30 mg daily x5 days, 20 mg daily x5 days, then 10 mg daily x5 days. She should continue with her current miralax regimen. I would also like to get a urine specimen to be sure there is not infection. I would like a call in 2 days with an update on how she is feeling. If she is not feeling better, I will consider admitting her to the hospital.   Orders Placed This Encounter   Procedures    POC URINE with Microscopic     Orders Placed This Encounter   Medications    predniSONE (DELTASONE) 10 MG tablet     Sig: Take 3 tablets by mouth daily for 10 days Daily x5 days, then 20 mg qd x5 days, then 10 mg qd x5 days. Dispense:  30 tablet     Refill:  0    predniSONE (DELTASONE) 10 MG tablet     Sig: Take 30 mg po qd x5 days, then take 20 mg po qd x5 days, then 10 mg qd x5 days. Dispense:  30 tablet     Refill:  0     I, Dr. Rita Tlaavera, personally performed the services described in this documentation as scribed by SOFYA Perez in my presence, and is both accurate and complete.

## 2019-06-14 PROBLEM — N28.9 ACUTE ON CHRONIC RENAL INSUFFICIENCY: Status: RESOLVED | Noted: 2019-03-08 | Resolved: 2019-06-14

## 2019-06-14 PROBLEM — N18.9 ACUTE ON CHRONIC RENAL INSUFFICIENCY: Status: RESOLVED | Noted: 2019-03-08 | Resolved: 2019-06-14

## 2019-06-14 PROBLEM — I50.23 CHF (CONGESTIVE HEART FAILURE), NYHA CLASS I, ACUTE ON CHRONIC, SYSTOLIC (HCC): Status: RESOLVED | Noted: 2019-03-08 | Resolved: 2019-06-14

## 2019-06-14 PROBLEM — N39.0 URINARY TRACT INFECTION WITHOUT HEMATURIA: Status: RESOLVED | Noted: 2017-10-27 | Resolved: 2019-06-14

## 2019-06-14 PROBLEM — E43 SEVERE MALNUTRITION (HCC): Status: RESOLVED | Noted: 2019-04-02 | Resolved: 2019-06-14

## 2019-06-14 PROBLEM — I50.9 CONGESTIVE HEART FAILURE (HCC): Status: RESOLVED | Noted: 2019-03-08 | Resolved: 2019-06-14

## 2019-06-17 ENCOUNTER — OFFICE VISIT (OUTPATIENT)
Dept: CARDIOLOGY | Age: 84
End: 2019-06-17
Payer: MEDICARE

## 2019-06-17 VITALS
OXYGEN SATURATION: 96 % | DIASTOLIC BLOOD PRESSURE: 81 MMHG | HEIGHT: 63 IN | BODY MASS INDEX: 19.53 KG/M2 | HEART RATE: 89 BPM | SYSTOLIC BLOOD PRESSURE: 127 MMHG | WEIGHT: 110.2 LBS

## 2019-06-17 DIAGNOSIS — I25.10 ASHD (ARTERIOSCLEROTIC HEART DISEASE): ICD-10-CM

## 2019-06-17 DIAGNOSIS — I48.20 CHRONIC ATRIAL FIBRILLATION (HCC): ICD-10-CM

## 2019-06-17 DIAGNOSIS — E78.2 MIXED HYPERLIPIDEMIA: ICD-10-CM

## 2019-06-17 DIAGNOSIS — I50.32 CHRONIC DIASTOLIC CONGESTIVE HEART FAILURE (HCC): ICD-10-CM

## 2019-06-17 DIAGNOSIS — I27.20 PULMONARY HYPERTENSION (HCC): Primary | ICD-10-CM

## 2019-06-17 PROCEDURE — 99214 OFFICE O/P EST MOD 30 MIN: CPT | Performed by: FAMILY MEDICINE

## 2019-06-17 PROCEDURE — 1090F PRES/ABSN URINE INCON ASSESS: CPT | Performed by: FAMILY MEDICINE

## 2019-06-17 PROCEDURE — G8420 CALC BMI NORM PARAMETERS: HCPCS | Performed by: FAMILY MEDICINE

## 2019-06-17 PROCEDURE — G8598 ASA/ANTIPLAT THER USED: HCPCS | Performed by: FAMILY MEDICINE

## 2019-06-17 PROCEDURE — 1036F TOBACCO NON-USER: CPT | Performed by: FAMILY MEDICINE

## 2019-06-17 PROCEDURE — G8427 DOCREV CUR MEDS BY ELIG CLIN: HCPCS | Performed by: FAMILY MEDICINE

## 2019-06-17 PROCEDURE — 1123F ACP DISCUSS/DSCN MKR DOCD: CPT | Performed by: FAMILY MEDICINE

## 2019-06-17 PROCEDURE — 4040F PNEUMOC VAC/ADMIN/RCVD: CPT | Performed by: FAMILY MEDICINE

## 2019-06-17 RX ORDER — AMLODIPINE BESYLATE 2.5 MG/1
TABLET ORAL
Qty: 90 TABLET | Refills: 3 | Status: ON HOLD | OUTPATIENT
Start: 2019-06-17 | End: 2019-11-04

## 2019-06-17 NOTE — PROGRESS NOTES
Adri Lucas am scribing for and in the presence of Frieda Neal. Simeon Beard MD, MS, F.A.C.C..    Patient: Winsome Back  : 10/13/1927  Date of Visit: 2019    REASON FOR VISIT / CONSULTATION: pulmonary artery hypertension, paroxysmal atrial fibrillation, 6 Month Follow-Up (HX: CHF, CAD, Hyperlipidemia, Pulm HTN. Pt states she has been doing well. Occasional SOB with exertion, unchanged. Denies: CP, palpitations, lightheadedness/dizziness. )      Dear Radha Dennison MD,     I had the pleasure of seeing your patient Winsome Back in follow-up today. As you know, Ms. Her is a 80 y.o. female with a history of mild pulmonary artery hypertension with a mean pulmonary artery pressure of 26 with a normal LVEDP as well as moderate to severe right ventricular enlargement. As a result, she was started on Adcirca and her estimated RVSP on echocardiography has fallen from 70-75 mmHg to only 36 mmHg on her 14 echocardiogram with a significant improvement in her symptoms. Her repeat echo in  showed her estimated RVSP to be only 43 mmHg. This increased to 55 mmHg on a  echocardiogram with severe RV dilation. Since her last visit she reports doing great although she is starting to slow down. She continues to report living at home and being quite self-sufficient with cooking her own meals and baths. She reports she can clean for 30-45 minutes and then needs to take a break due to shortness of breath. She denied any current or recent chest pain, abdominal pain, bleeding problems, near falls or recent falls, problems with her medications or any other concerns at this time. Past Medical History:   Diagnosis Date    Abnormal echocardiogram 5/15/12    EF >55%. mild diastolic dysfunction. RVSP 70 mmHg on echocardiogram. the right ventricle is moderately to severely enlarged with preserved function.  Severe tricuspid regurgitation    Abnormal resting ECG findings 12    sinus bradycardia 55 beats per minute. Incomplete right bundle branch block. Evidence of left atrial enlargement. Abnormal ECG a    Allergic rhinitis 4/17/2017    Anxiety     ASHD (arteriosclerotic heart disease) 8/1997    CA 50-60%    ASHD (arteriosclerotic heart disease) 08/97    CA 38-32%    Diastolic congestive heart failure (HCC)     Elevated liver enzymes 1996    GERD (gastroesophageal reflux disease)     H/O echocardiogram 10/13/15    EF:60%. Mild mitral regurgitation. Moderate to severe tricuspid reguritation. Moderately dilated right atrium and right ventricle systolic pressure of 57 mmHg. Evidence of mild (Grade 1) diastolic dysfunction.  H/O echocardiogram 05/26/2017    Estimated EF 55%. The right atrium appears severly dialted. Right ventricular dilatation. Moderate pulmonary HTN with an estimated right ventricular systolic pressure of 97BPKQ. Moderate to severe tricuspid regurg. Evidence of mild diastolic dysfunction is seen. Compared to the previous study of 10/13/15 the pt estimated right sided pressure has further declined from 57mmHg to 45mmHg.  H/O echocardiogram 05/2018    EF 60% Mild Mitral regurg Severly dilated right atrium and right ventricle with mildly reduced right ventricular systolic function. Severe tricuspid regurgitation. Moderate pulmonary hypertension with an estimated right ventricular systolic pressure of 85KOKP Evidence of moderate diastolic dysfunction is seen    Hiatal hernia     History of echocardiogram 9/27/14    Compared to the previous study of 4/18/13, the patients estimated RVSP has decreased from >75 mmHg to 35mmHg    History of stress test 10/26/15    Probably normal. EF >70%. Significant electrocardiographic  evidence of MI during EKG monitoring without significant associated arrhythmias. Max ST depression was 1.4 mm in lead ll. Low risk for significant CAD.     Hyperglycemia 2008    Hyperlipidemia 1992    Hypertension 1991    Hyperthyroidism     Kidney stone     Menopause age 43    Osteoarthritis     lumbar    PAH (pulmonary artery hypertension) ([facility] [address] 7/5/2012    RHC: Mean PA 26, PCWP 12. Echo 6/12: RVSP 70    Pulmonary hypertension (HCC) 5/15/12    RVSP 70 mmHg on echocardiogram. the right ventricle is moderately to severely enlarged with preserved function. Severe tricuspid regurgitation.  Status post right heart catheterization 7/5/2012    RHC: Mean PA 26, PCWP 12.    Tricuspid regurgitation     MR 4/2002 ATB proph    Unspecified hypothyroidism 2/12/2015       CURRENT ALLERGIES: Patient has no known allergies. REVIEW OF SYSTEMS: 14 systems were reviewed. Pertinent positives and negatives as above, all else negative. Past Surgical History:   Procedure Laterality Date    CARDIAC SURGERY      CATARACT REMOVAL  10/2015    Dr. Deniz Black Bilateral 10/2014    Cataracts Surgery    Social History:  Social History     Tobacco Use    Smoking status: Never Smoker    Smokeless tobacco: Never Used   Substance Use Topics    Alcohol use: No    Drug use: Never        CURRENT MEDICATIONS:  Outpatient Medications Marked as Taking for the 6/17/19 encounter (Office Visit) with Melvi Lugo MD   Medication Sig Dispense Refill    amLODIPine (NORVASC) 2.5 MG tablet TAKE ONE TABLET BY MOUTH DAILY 90 tablet 3    rOPINIRole (REQUIP) 0.5 MG tablet ropinirole 0.5 mg tablet   Take 1 tablet every day by oral route.  HYDROcodone-acetaminophen (NORCO) 5-325 MG per tablet Take 1 tablet by mouth every 8 hours as needed for Pain for up to 30 days. TAKE 1/2 TABLET BY MOUTH EVERY 8 HOURS AS NEEDED FOR PAIN 30 tablet 0    levothyroxine (SYNTHROID) 100 MCG tablet TAKE ONE TABLET BY MOUTH DAILY 90 tablet 3    carvedilol (COREG) 6.25 MG tablet Take 1 tablet by mouth 2 times daily (with meals) 60 tablet 3    sildenafil (REVATIO) 20 MG tablet sildenafil (antihypertensive) 20 mg tablet  Take 1 tablet 2  times a day by oral route for 90 days.  Misc.  Devices (STEP N REST WALKER) MISC 1 Device by Does not apply route continuous 1 each 0    apixaban (ELIQUIS) 2.5 MG TABS tablet Take 1 tablet by mouth 2 times daily 180 tablet 3    omeprazole (PRILOSEC) 40 MG delayed release capsule TAKE ONE CAPSULE BY MOUTH DAILY 90 capsule 3    spironolactone (ALDACTONE) 25 MG tablet Take 1 tablet by mouth daily 90 tablet 3    acetaminophen (TYLENOL) 500 MG tablet Take 500 mg by mouth every 6 hours as needed for Pain or Fever      furosemide (LASIX) 20 MG tablet TAKE 1 TABLET DAILY IF THERE IS A 1 LB WEIGHT GAIN THEN TAKE 2 TABLETS ON THOSE DAYS (Patient taking differently: Take 20 mg by mouth daily TAKE 1 TABLET DAILY IF THERE IS A 1 LB WEIGHT GAIN THEN TAKE 2 TABLETS ON THOSE DAYS) 90 tablet 3    atorvastatin (LIPITOR) 40 MG tablet TAKE ONE TABLET BY MOUTH DAILY 90 tablet 3    calcium carbonate (TUMS) 500 MG chewable tablet Take 1 tablet by mouth daily as needed          FAMILY HISTORY: family history includes Cancer in her sister; Heart Disease in her sister; Heart Failure in her mother; High Blood Pressure in her mother and sister; High Cholesterol in her sister; Other in her brother and mother; Stroke in her father. PHYSICAL EXAM:   /81 (Site: Left Upper Arm, Position: Sitting, Cuff Size: Small Adult)   Pulse 89   Ht 5' 3\" (1.6 m)   Wt 110 lb 3.2 oz (50 kg)   SpO2 96%   BMI 19.52 kg/m²  Body mass index is 19.52 kg/m². Constitutional: She is oriented to person, place, and time. She appears well-developed and well-nourished. In no acute distress. HEENT: Normocephalic and atraumatic. No JVD present. Carotid bruit is not present. No mass and no thyromegaly present. No lymphadenopathy present. Cardiovascular: Normal rate, regular rhythm, normal heart sounds. Exam reveals no gallop and no friction rubs. A III/VI systolic was heard maximally at the 2nd right intercostal space. Pulmonary/Chest: Effort normal and breath sounds normal. No respiratory distress.  She has no Therapy: Continue atorvastatin (Lipitor) 40 mg nightly. · Hyperlipidemia: Mixed  · Statin Therapy: Continue atorvastatin (Lipitor) 40 mg nightly. Chronic Diastolic Heart Failure:   · Beta Blocker: Continue Carvedilol (Coreg) 6.25 mg twice daily. · Diuretics: Continue furosemide (lasix) 20 mg as needed and Continue Spironolactone (Aldactone) 25 mg daily. · Nonpharmacologic management of Heart Failure: I advised her to try and keep his legs up whenever possible and to limit salt in her diet. · Chronic Atrial Fibrillation: Rate Control Asymptomatic   Calcium Channel Blocker: Continue amlodipine (Norvasc) 5 mg 1/2 tab once daily.  Stroke Risk: CHADS2-VASc Score: greater than 2 (2.2% stroke risk)   Anticoagulation: Anticoagulation: Apixaban (Eliquis) 2.5 mg every 12 hours. Finally, I recommended that she continue her other medications and follow up with you as previously scheduled. FOLLOW UP:    I told Ms. Her to call my office if she had any problems, but otherwise told her to Return in about 6 months (around 12/17/2019). However, I would be happy to see her sooner should the need arise. Once again, thank you for allowing me to participate in this patients care. Please do not hesitate to contact me could I be of further assistance. Sincerely,  Myrna Taylor MD, MS, F.A.C.C. [facility], [address] [place] [address]  Phone: [phone], Fax: [phone]    I believe that the risk of significant morbidity and mortality related to the patient's current medical conditions are: Intermediate. 25 minutes were spent with the patient and all of their questions were answered. The documentation recorded by the scribe, accurately and completely reflects the services I personally performed and the decisions made by me. Yariel Talyor MD, MS, F.A.C.C.  June 17, 2019

## 2019-07-07 ENCOUNTER — HOSPITAL ENCOUNTER (INPATIENT)
Age: 84
LOS: 1 days | Discharge: HOME | DRG: 392 | End: 2019-07-08
Attending: FAMILY MEDICINE | Admitting: FAMILY MEDICINE
Payer: MEDICARE

## 2019-07-07 ENCOUNTER — APPOINTMENT (OUTPATIENT)
Dept: GENERAL RADIOLOGY | Age: 84
DRG: 392 | End: 2019-07-07
Attending: FAMILY MEDICINE
Payer: MEDICARE

## 2019-07-07 DIAGNOSIS — R10.30 LOWER ABDOMINAL PAIN: Primary | ICD-10-CM

## 2019-07-07 LAB
-: NORMAL
ALBUMIN SERPL-MCNC: 4.1 G/DL (ref 3.5–5.2)
ALBUMIN/GLOBULIN RATIO: 1.3 (ref 1–2.5)
ALP BLD-CCNC: 126 U/L (ref 35–104)
ALT SERPL-CCNC: 15 U/L (ref 5–33)
AMORPHOUS: NORMAL
ANION GAP SERPL CALCULATED.3IONS-SCNC: 11 MMOL/L (ref 9–17)
AST SERPL-CCNC: 23 U/L
BACTERIA: NORMAL
BILIRUB SERPL-MCNC: 0.44 MG/DL (ref 0.3–1.2)
BILIRUBIN URINE: NEGATIVE
BUN BLDV-MCNC: 22 MG/DL (ref 8–23)
BUN/CREAT BLD: 20 (ref 9–20)
CALCIUM SERPL-MCNC: 9.6 MG/DL (ref 8.6–10.4)
CASTS UA: NORMAL /LPF
CHLORIDE BLD-SCNC: 104 MMOL/L (ref 98–107)
CO2: 26 MMOL/L (ref 20–31)
COLOR: YELLOW
COMMENT UA: NORMAL
CREAT SERPL-MCNC: 1.1 MG/DL (ref 0.5–0.9)
CRYSTALS, UA: NORMAL /HPF
EPITHELIAL CELLS UA: NORMAL /HPF (ref 0–25)
GFR AFRICAN AMERICAN: 56 ML/MIN
GFR NON-AFRICAN AMERICAN: 47 ML/MIN
GFR SERPL CREATININE-BSD FRML MDRD: ABNORMAL ML/MIN/{1.73_M2}
GFR SERPL CREATININE-BSD FRML MDRD: ABNORMAL ML/MIN/{1.73_M2}
GLUCOSE BLD-MCNC: 102 MG/DL (ref 70–99)
GLUCOSE URINE: NEGATIVE
HCT VFR BLD CALC: 40.4 % (ref 36.3–47.1)
HEMOGLOBIN: 12.7 G/DL (ref 11.9–15.1)
KETONES, URINE: NEGATIVE
LEUKOCYTE ESTERASE, URINE: NEGATIVE
MCH RBC QN AUTO: 31.2 PG (ref 25.2–33.5)
MCHC RBC AUTO-ENTMCNC: 31.4 G/DL (ref 28.4–34.8)
MCV RBC AUTO: 99.3 FL (ref 82.6–102.9)
MUCUS: NORMAL
NITRITE, URINE: NEGATIVE
NRBC AUTOMATED: 0 PER 100 WBC
OTHER OBSERVATIONS UA: NORMAL
PDW BLD-RTO: 15.2 % (ref 11.8–14.4)
PH UA: 5.5 (ref 5–9)
PLATELET # BLD: 270 K/UL (ref 138–453)
PMV BLD AUTO: 11.2 FL (ref 8.1–13.5)
POTASSIUM SERPL-SCNC: 4.5 MMOL/L (ref 3.7–5.3)
PROTEIN UA: NEGATIVE
RBC # BLD: 4.07 M/UL (ref 3.95–5.11)
RBC UA: NORMAL /HPF (ref 0–2)
RENAL EPITHELIAL, UA: NORMAL /HPF
SODIUM BLD-SCNC: 141 MMOL/L (ref 135–144)
SPECIFIC GRAVITY UA: 1.02 (ref 1.01–1.02)
TOTAL PROTEIN: 7.3 G/DL (ref 6.4–8.3)
TRICHOMONAS: NORMAL
TURBIDITY: CLEAR
URINE HGB: NEGATIVE
UROBILINOGEN, URINE: NORMAL
WBC # BLD: 7 K/UL (ref 3.5–11.3)
WBC UA: NORMAL /HPF (ref 0–5)
YEAST: NORMAL

## 2019-07-07 PROCEDURE — 99222 1ST HOSP IP/OBS MODERATE 55: CPT | Performed by: FAMILY MEDICINE

## 2019-07-07 PROCEDURE — 36415 COLL VENOUS BLD VENIPUNCTURE: CPT

## 2019-07-07 PROCEDURE — 6360000002 HC RX W HCPCS: Performed by: FAMILY MEDICINE

## 2019-07-07 PROCEDURE — 81001 URINALYSIS AUTO W/SCOPE: CPT

## 2019-07-07 PROCEDURE — 6370000000 HC RX 637 (ALT 250 FOR IP): Performed by: FAMILY MEDICINE

## 2019-07-07 PROCEDURE — 93005 ELECTROCARDIOGRAM TRACING: CPT | Performed by: FAMILY MEDICINE

## 2019-07-07 PROCEDURE — 2580000003 HC RX 258: Performed by: FAMILY MEDICINE

## 2019-07-07 PROCEDURE — 74022 RADEX COMPL AQT ABD SERIES: CPT

## 2019-07-07 PROCEDURE — 1200000000 HC SEMI PRIVATE

## 2019-07-07 PROCEDURE — 85027 COMPLETE CBC AUTOMATED: CPT

## 2019-07-07 PROCEDURE — 80053 COMPREHEN METABOLIC PANEL: CPT

## 2019-07-07 RX ORDER — LEVOTHYROXINE SODIUM 0.1 MG/1
100 TABLET ORAL DAILY
Status: DISCONTINUED | OUTPATIENT
Start: 2019-07-08 | End: 2019-07-08 | Stop reason: HOSPADM

## 2019-07-07 RX ORDER — FAMOTIDINE 20 MG/1
20 TABLET, FILM COATED ORAL DAILY
Status: DISCONTINUED | OUTPATIENT
Start: 2019-07-07 | End: 2019-07-08 | Stop reason: HOSPADM

## 2019-07-07 RX ORDER — MORPHINE SULFATE 2 MG/ML
2 INJECTION, SOLUTION INTRAMUSCULAR; INTRAVENOUS
Status: DISCONTINUED | OUTPATIENT
Start: 2019-07-07 | End: 2019-07-08 | Stop reason: HOSPADM

## 2019-07-07 RX ORDER — ATORVASTATIN CALCIUM 40 MG/1
40 TABLET, FILM COATED ORAL DAILY
Status: DISCONTINUED | OUTPATIENT
Start: 2019-07-07 | End: 2019-07-08 | Stop reason: HOSPADM

## 2019-07-07 RX ORDER — MORPHINE SULFATE 4 MG/ML
4 INJECTION, SOLUTION INTRAMUSCULAR; INTRAVENOUS
Status: DISCONTINUED | OUTPATIENT
Start: 2019-07-07 | End: 2019-07-08 | Stop reason: HOSPADM

## 2019-07-07 RX ORDER — SODIUM CHLORIDE 0.9 % (FLUSH) 0.9 %
10 SYRINGE (ML) INJECTION PRN
Status: DISCONTINUED | OUTPATIENT
Start: 2019-07-07 | End: 2019-07-08 | Stop reason: HOSPADM

## 2019-07-07 RX ORDER — SODIUM CHLORIDE 9 MG/ML
INJECTION, SOLUTION INTRAVENOUS CONTINUOUS
Status: DISCONTINUED | OUTPATIENT
Start: 2019-07-07 | End: 2019-07-08

## 2019-07-07 RX ORDER — ONDANSETRON 2 MG/ML
4 INJECTION INTRAMUSCULAR; INTRAVENOUS EVERY 6 HOURS PRN
Status: DISCONTINUED | OUTPATIENT
Start: 2019-07-07 | End: 2019-07-08 | Stop reason: HOSPADM

## 2019-07-07 RX ORDER — ACETAMINOPHEN 500 MG
500 TABLET ORAL EVERY 6 HOURS PRN
Status: DISCONTINUED | OUTPATIENT
Start: 2019-07-07 | End: 2019-07-08 | Stop reason: HOSPADM

## 2019-07-07 RX ORDER — CARVEDILOL 6.25 MG/1
6.25 TABLET ORAL 2 TIMES DAILY WITH MEALS
Status: DISCONTINUED | OUTPATIENT
Start: 2019-07-07 | End: 2019-07-08 | Stop reason: HOSPADM

## 2019-07-07 RX ORDER — SILDENAFIL CITRATE 20 MG/1
20 TABLET ORAL 3 TIMES DAILY
Status: DISCONTINUED | OUTPATIENT
Start: 2019-07-07 | End: 2019-07-08 | Stop reason: HOSPADM

## 2019-07-07 RX ORDER — AMLODIPINE BESYLATE 2.5 MG/1
2.5 TABLET ORAL DAILY
Status: DISCONTINUED | OUTPATIENT
Start: 2019-07-08 | End: 2019-07-08 | Stop reason: HOSPADM

## 2019-07-07 RX ORDER — ACETAMINOPHEN 325 MG/1
650 TABLET ORAL EVERY 4 HOURS PRN
Status: DISCONTINUED | OUTPATIENT
Start: 2019-07-07 | End: 2019-07-08 | Stop reason: HOSPADM

## 2019-07-07 RX ORDER — SODIUM CHLORIDE 0.9 % (FLUSH) 0.9 %
10 SYRINGE (ML) INJECTION EVERY 12 HOURS SCHEDULED
Status: DISCONTINUED | OUTPATIENT
Start: 2019-07-07 | End: 2019-07-08 | Stop reason: HOSPADM

## 2019-07-07 RX ORDER — SPIRONOLACTONE 25 MG/1
25 TABLET ORAL DAILY
Status: DISCONTINUED | OUTPATIENT
Start: 2019-07-08 | End: 2019-07-08 | Stop reason: HOSPADM

## 2019-07-07 RX ADMIN — CARVEDILOL 6.25 MG: 6.25 TABLET, FILM COATED ORAL at 17:17

## 2019-07-07 RX ADMIN — APIXABAN 2.5 MG: 2.5 TABLET, FILM COATED ORAL at 21:29

## 2019-07-07 RX ADMIN — SODIUM CHLORIDE: 9 INJECTION, SOLUTION INTRAVENOUS at 14:53

## 2019-07-07 RX ADMIN — MORPHINE SULFATE 2 MG: 2 INJECTION, SOLUTION INTRAMUSCULAR; INTRAVENOUS at 17:17

## 2019-07-07 RX ADMIN — MORPHINE SULFATE 4 MG: 4 INJECTION INTRAVENOUS at 15:06

## 2019-07-07 RX ADMIN — ONDANSETRON 4 MG: 2 INJECTION INTRAMUSCULAR; INTRAVENOUS at 15:06

## 2019-07-07 RX ADMIN — SILDENAFIL CITRATE 20 MG: 20 TABLET ORAL at 21:30

## 2019-07-07 RX ADMIN — ATORVASTATIN CALCIUM 40 MG: 40 TABLET, FILM COATED ORAL at 21:29

## 2019-07-07 ASSESSMENT — PAIN DESCRIPTION - ONSET
ONSET: GRADUAL
ONSET: ON-GOING
ONSET: GRADUAL

## 2019-07-07 ASSESSMENT — PAIN DESCRIPTION - PAIN TYPE
TYPE: ACUTE PAIN

## 2019-07-07 ASSESSMENT — PAIN DESCRIPTION - DESCRIPTORS
DESCRIPTORS: SHARP;SHOOTING;CRAMPING
DESCRIPTORS: SHARP;SHOOTING

## 2019-07-07 ASSESSMENT — PAIN DESCRIPTION - FREQUENCY
FREQUENCY: INTERMITTENT

## 2019-07-07 ASSESSMENT — PAIN SCALES - GENERAL
PAINLEVEL_OUTOF10: 0
PAINLEVEL_OUTOF10: 8
PAINLEVEL_OUTOF10: 7
PAINLEVEL_OUTOF10: 4

## 2019-07-07 ASSESSMENT — PAIN DESCRIPTION - LOCATION
LOCATION: ABDOMEN

## 2019-07-07 ASSESSMENT — PAIN DESCRIPTION - PROGRESSION
CLINICAL_PROGRESSION: GRADUALLY WORSENING

## 2019-07-07 ASSESSMENT — PAIN DESCRIPTION - ORIENTATION
ORIENTATION: LOWER;MID
ORIENTATION: MID;UPPER
ORIENTATION: LOWER;MID

## 2019-07-08 ENCOUNTER — APPOINTMENT (OUTPATIENT)
Dept: CT IMAGING | Age: 84
DRG: 392 | End: 2019-07-08
Attending: FAMILY MEDICINE
Payer: MEDICARE

## 2019-07-08 VITALS
RESPIRATION RATE: 16 BRPM | BODY MASS INDEX: 19.49 KG/M2 | OXYGEN SATURATION: 92 % | WEIGHT: 110 LBS | SYSTOLIC BLOOD PRESSURE: 110 MMHG | TEMPERATURE: 98.3 F | HEART RATE: 69 BPM | DIASTOLIC BLOOD PRESSURE: 75 MMHG | HEIGHT: 63 IN

## 2019-07-08 LAB
ABSOLUTE EOS #: 0.18 K/UL (ref 0–0.44)
ABSOLUTE IMMATURE GRANULOCYTE: <0.03 K/UL (ref 0–0.3)
ABSOLUTE LYMPH #: 2.04 K/UL (ref 1.1–3.7)
ABSOLUTE MONO #: 0.79 K/UL (ref 0.1–1.2)
ANION GAP SERPL CALCULATED.3IONS-SCNC: 10 MMOL/L (ref 9–17)
BASOPHILS # BLD: 1 % (ref 0–2)
BASOPHILS ABSOLUTE: 0.07 K/UL (ref 0–0.2)
BUN BLDV-MCNC: 26 MG/DL (ref 8–23)
BUN/CREAT BLD: 24 (ref 9–20)
CALCIUM SERPL-MCNC: 9.3 MG/DL (ref 8.6–10.4)
CHLORIDE BLD-SCNC: 103 MMOL/L (ref 98–107)
CO2: 26 MMOL/L (ref 20–31)
CREAT SERPL-MCNC: 1.1 MG/DL (ref 0.5–0.9)
DIFFERENTIAL TYPE: ABNORMAL
EKG ATRIAL RATE: 159 BPM
EKG Q-T INTERVAL: 350 MS
EKG QRS DURATION: 90 MS
EKG QTC CALCULATION (BAZETT): 430 MS
EKG R AXIS: 85 DEGREES
EKG T AXIS: -18 DEGREES
EKG VENTRICULAR RATE: 91 BPM
EOSINOPHILS RELATIVE PERCENT: 2 % (ref 1–4)
GFR AFRICAN AMERICAN: 56 ML/MIN
GFR NON-AFRICAN AMERICAN: 47 ML/MIN
GFR SERPL CREATININE-BSD FRML MDRD: ABNORMAL ML/MIN/{1.73_M2}
GFR SERPL CREATININE-BSD FRML MDRD: ABNORMAL ML/MIN/{1.73_M2}
GLUCOSE BLD-MCNC: 93 MG/DL (ref 70–99)
HCT VFR BLD CALC: 39 % (ref 36.3–47.1)
HEMOGLOBIN: 12.1 G/DL (ref 11.9–15.1)
IMMATURE GRANULOCYTES: 0 %
LYMPHOCYTES # BLD: 23 % (ref 24–43)
MCH RBC QN AUTO: 32.1 PG (ref 25.2–33.5)
MCHC RBC AUTO-ENTMCNC: 31 G/DL (ref 28.4–34.8)
MCV RBC AUTO: 103.4 FL (ref 82.6–102.9)
MONOCYTES # BLD: 9 % (ref 3–12)
NRBC AUTOMATED: 0 PER 100 WBC
PDW BLD-RTO: 15.3 % (ref 11.8–14.4)
PLATELET # BLD: 265 K/UL (ref 138–453)
PLATELET ESTIMATE: ABNORMAL
PMV BLD AUTO: 10.9 FL (ref 8.1–13.5)
POTASSIUM SERPL-SCNC: 4.5 MMOL/L (ref 3.7–5.3)
PROCALCITONIN: 0.13 NG/ML
RBC # BLD: 3.77 M/UL (ref 3.95–5.11)
RBC # BLD: ABNORMAL 10*6/UL
SEG NEUTROPHILS: 65 % (ref 36–65)
SEGMENTED NEUTROPHILS ABSOLUTE COUNT: 5.72 K/UL (ref 1.5–8.1)
SODIUM BLD-SCNC: 139 MMOL/L (ref 135–144)
WBC # BLD: 8.8 K/UL (ref 3.5–11.3)
WBC # BLD: ABNORMAL 10*3/UL

## 2019-07-08 PROCEDURE — 6370000000 HC RX 637 (ALT 250 FOR IP): Performed by: FAMILY MEDICINE

## 2019-07-08 PROCEDURE — 97116 GAIT TRAINING THERAPY: CPT

## 2019-07-08 PROCEDURE — 6360000004 HC RX CONTRAST MEDICATION: Performed by: FAMILY MEDICINE

## 2019-07-08 PROCEDURE — 97110 THERAPEUTIC EXERCISES: CPT

## 2019-07-08 PROCEDURE — 85025 COMPLETE CBC W/AUTO DIFF WBC: CPT

## 2019-07-08 PROCEDURE — 93010 ELECTROCARDIOGRAM REPORT: CPT | Performed by: INTERNAL MEDICINE

## 2019-07-08 PROCEDURE — 99232 SBSQ HOSP IP/OBS MODERATE 35: CPT | Performed by: FAMILY MEDICINE

## 2019-07-08 PROCEDURE — 84145 PROCALCITONIN (PCT): CPT

## 2019-07-08 PROCEDURE — 80048 BASIC METABOLIC PNL TOTAL CA: CPT

## 2019-07-08 PROCEDURE — 74177 CT ABD & PELVIS W/CONTRAST: CPT

## 2019-07-08 PROCEDURE — 2580000003 HC RX 258: Performed by: FAMILY MEDICINE

## 2019-07-08 PROCEDURE — G0378 HOSPITAL OBSERVATION PER HR: HCPCS

## 2019-07-08 PROCEDURE — 97161 PT EVAL LOW COMPLEX 20 MIN: CPT

## 2019-07-08 RX ORDER — POLYETHYLENE GLYCOL 3350 17 G/17G
17 POWDER, FOR SOLUTION ORAL DAILY
Qty: 527 G | Refills: 1 | Status: SHIPPED | OUTPATIENT
Start: 2019-07-09 | End: 2019-08-08

## 2019-07-08 RX ORDER — HYOSCYAMINE SULFATE 0.12 MG/1
0.12 TABLET SUBLINGUAL 4 TIMES DAILY PRN
Qty: 30 EACH | Refills: 1 | Status: SHIPPED | OUTPATIENT
Start: 2019-07-08 | End: 2019-09-14 | Stop reason: SDUPTHER

## 2019-07-08 RX ORDER — POLYETHYLENE GLYCOL 3350 17 G/17G
17 POWDER, FOR SOLUTION ORAL DAILY
Status: DISCONTINUED | OUTPATIENT
Start: 2019-07-08 | End: 2019-07-08 | Stop reason: HOSPADM

## 2019-07-08 RX ORDER — HYDROCODONE BITARTRATE AND ACETAMINOPHEN 5; 325 MG/1; MG/1
1 TABLET ORAL EVERY 4 HOURS PRN
Qty: 30 TABLET | Refills: 0 | Status: SHIPPED | OUTPATIENT
Start: 2019-07-08 | End: 2019-07-13

## 2019-07-08 RX ADMIN — CARVEDILOL 6.25 MG: 6.25 TABLET, FILM COATED ORAL at 09:33

## 2019-07-08 RX ADMIN — SILDENAFIL CITRATE 20 MG: 20 TABLET ORAL at 15:50

## 2019-07-08 RX ADMIN — SPIRONOLACTONE 25 MG: 25 TABLET ORAL at 09:30

## 2019-07-08 RX ADMIN — APIXABAN 2.5 MG: 2.5 TABLET, FILM COATED ORAL at 09:30

## 2019-07-08 RX ADMIN — IOPAMIDOL 30 ML: 755 INJECTION, SOLUTION INTRAVENOUS at 10:59

## 2019-07-08 RX ADMIN — SILDENAFIL CITRATE 20 MG: 20 TABLET ORAL at 09:36

## 2019-07-08 RX ADMIN — SODIUM CHLORIDE: 9 INJECTION, SOLUTION INTRAVENOUS at 02:54

## 2019-07-08 RX ADMIN — CARVEDILOL 6.25 MG: 6.25 TABLET, FILM COATED ORAL at 16:51

## 2019-07-08 RX ADMIN — POLYETHYLENE GLYCOL (3350) 17 G: 17 POWDER, FOR SOLUTION ORAL at 09:30

## 2019-07-08 RX ADMIN — FAMOTIDINE 20 MG: 20 TABLET, FILM COATED ORAL at 09:30

## 2019-07-08 RX ADMIN — IOPAMIDOL 75 ML: 755 INJECTION, SOLUTION INTRAVENOUS at 10:59

## 2019-07-08 RX ADMIN — LEVOTHYROXINE SODIUM 100 MCG: 100 TABLET ORAL at 09:33

## 2019-07-08 RX ADMIN — AMLODIPINE BESYLATE 2.5 MG: 2.5 TABLET ORAL at 09:33

## 2019-07-08 ASSESSMENT — PAIN SCALES - GENERAL: PAINLEVEL_OUTOF10: 0

## 2019-07-08 NOTE — PLAN OF CARE
membranes  Description  Structural intactness and normal physiological function of skin and  mucous membranes. 7/8/2019 1358 by Teresa Juarez RN  Outcome: Ongoing  Note:   Skin assessment performed at regular intervals. No redness or open areas noted. Pt able to turn self, assistance provided when needed. Will continue to monitor patient closely for breakdown. 7/8/2019 0001 by Eliseo Navarro RN  Outcome: Ongoing  Note:   Joe score assessed every shift. Patient is able to turn self. No skin issues noted as of now. Will continue to monitor. Problem: Bowel/Gastric:  Goal: Bowel function will improve  Description  Bowel function will improve  7/8/2019 1358 by Teresa Juarez RN  Outcome: Ongoing  Note:   Patient passing has this am. BSAx4. To CT scan, rectal contrast given. Patient reports large bowel movement while in radiology. 7/8/2019 0001 by Eliseo Navarro RN  Outcome: Ongoing  Note:   Patient stated that she had a bowel movement at home before she came to the hospital. Has not have any BM here in this admission. Bowel sounds are hypoactive in all four quadrants. Will continue to monitor. Goal: Complications related to the disease process, condition or treatment will be avoided or minimized  Description  Complications related to the disease process, condition or treatment will be avoided or minimized  7/8/2019 0001 by Eliseo Navarro RN  Outcome: Ongoing  Note:   Patient stated she was having abdominal cramps and nausea for the earlier shift but had no complaints for this shift. Patient stated of feeling better overall in comparison to the time when she got admitted.

## 2019-07-08 NOTE — DISCHARGE SUMMARY
Abdominal pain  Active Problems:    ASHD (arteriosclerotic heart disease)    Degeneration of lumbar or lumbosacral intervertebral disc    Essential hypertension    Chronic renal failure, stage 3 (moderate) (HCC)    Diverticulosis of intestine without bleeding    Pulmonary hypertension (HCC)    Chronic atrial fibrillation (HCC)    Chronic anticoagulation    Abdominal pain, LLQ    Chronic idiopathic constipation    Moderate malnutrition (HCC)  Resolved Problems:    * No resolved hospital problems.  *      Active Hospital Problems    Diagnosis Date Noted    Moderate malnutrition ([address] [E44.0] 05/01/2019    Chronic idiopathic constipation [K59.04] 04/30/2019    Abdominal pain, LLQ [R10.32] 04/08/2019    Chronic anticoagulation [Z79.01]     Abdominal pain [R10.9] 08/26/2018    Chronic atrial fibrillation ([address] [I48.2] 08/26/2018    Pulmonary hypertension ([address] [I27.20] 10/02/2017    Diverticulosis of intestine without bleeding [K57.90] 05/03/2017    Essential hypertension [I10] 01/30/2017    Chronic renal failure, stage 3 (moderate) (HCC) [N18.3] 01/30/2017    Degeneration of lumbar or lumbosacral intervertebral disc [M51.37] 02/12/2015    ASHD (arteriosclerotic heart disease) [I25.10] 08/01/1997       Discharge Medications:       [address] Medication Instructions D:[id number]    Printed on:07/08/19 8486   Medication Information                      acetaminophen (TYLENOL) 500 MG tablet  Take 500 mg by mouth every 6 hours as needed for Pain or Fever             amLODIPine (NORVASC) 2.5 MG tablet  TAKE ONE TABLET BY MOUTH DAILY             apixaban (ELIQUIS) 2.5 MG TABS tablet  Take 1 tablet by mouth 2 times daily             atorvastatin (LIPITOR) 40 MG tablet  TAKE ONE TABLET BY MOUTH DAILY             calcium carbonate (TUMS) 500 MG chewable tablet  Take 1 tablet by mouth daily as needed              carvedilol (COREG) 6.25 MG tablet  Take 1 tablet by mouth 2 times daily (with meals)

## 2019-07-08 NOTE — PROGRESS NOTES
Met with Patient this a.m. She is a 80year old , white female admitted with abdominal pain. Patient is alert and oriented, very pleasant and cooperative with this assessment. States that her youngest sister is a retired RN and \"she felt I needed to come to the hospital.\"      Patient resides alone in her own home in [place]. She has good informal support from family and friends. Patient continues to do her own cooking. Her daughter lives out of town but visits often and assists Patient with her housekeeping and grocery shopping. Patient has a walker, cane, shower chair and grab bars in the bathroom for support. Patient relies on family and sometimes [employer] for her transportation needs. PCP is Dr. Stephany Almonte. Patient denies having any difficulty with affording her medications. Patient wishes to be discharged home when she is deemed medically stable. She chooses to be a 'DNR-CCA' code status. She is noted to have Advanced Directives in place as well. No anticipated discharge planning needs identified by Patient at this time. LSW to monitor and assist with any/all needs or concerns as they arise.     Tracie Lucas  7/8/2019
Physical Therapy  Facility/Department: [facility] THE [facility]  Daily Treatment Note  NAME: Hamida Monroy  : 10/13/1927  MRN: [id number]    Date of Service: 2019    Discharge Recommendations:  Continue to assess pending progress        Assessment   Treatment Diagnosis: general weakness   Prognosis: Good  REQUIRES PT FOLLOW UP: Yes  Activity Tolerance  Activity Tolerance: Patient Tolerated treatment well     Patient Diagnosis(es): There were no encounter diagnoses. has a past medical history of Abnormal echocardiogram, Abnormal resting ECG findings, Allergic rhinitis, Anxiety, ASHD (arteriosclerotic heart disease), ASHD (arteriosclerotic heart disease), Diastolic congestive heart failure (HCC), Elevated liver enzymes, GERD (gastroesophageal reflux disease), H/O echocardiogram, H/O echocardiogram, H/O echocardiogram, Hiatal hernia, History of echocardiogram, History of stress test, Hyperglycemia, Hyperlipidemia, Hypertension, Hyperthyroidism, Kidney stone, Menopause, Osteoarthritis, PAH (pulmonary artery hypertension) ([address] Pulmonary hypertension ([address] Status post right heart catheterization, Tricuspid regurgitation, and Unspecified hypothyroidism. has a past surgical history that includes eye surgery (Bilateral, 10/2014); Cataract removal (10/2015); and Cardiac surgery. Restrictions  Restrictions/Precautions  Restrictions/Precautions: General Precautions, Fall Risk  Subjective   General  Chart Reviewed: Yes  Response To Previous Treatment: Patient with no complaints from previous session. Family / Caregiver Present: Yes  Referring Practitioner: Dr. Mikaela Aburto  Subjective: Pt denies pain at this time. Pt in good spirits and willing to participate.           Orientation  Orientation  Overall Orientation Status: Within Normal Limits  Cognition      Objective   Bed mobility  Comment: Seated in chair upon arrival.  Transfers  Sit to Stand: Stand by assistance  Stand to sit: Stand by
Mirilax with some improvement. Pt asks if she would be able to consume bean soup. Pt is advised to follow Low-FODMAP diet guidelines when at home and consider a digestive enzyme to minimize intolerances. Blood sugars are well controlled. Creatinine slightly elevated, but no complaints of altered taste of foods. Pt is at moderate nutrition risk and meets the criteria for moderate malnutrition due to 12.8% wt loss x 8 months, poor appetite for a week, and mild losses of subcutaneous fat/muscle. See malnutrition assessment. · Nutrition-Focused Physical Findings: +Active bowel sonds,   · Wound Type: None  · Current Nutrition Therapies:  · Oral Diet Orders: Clear Liquid   · Oral Diet intake: Unable to assess  · Oral Nutrition Supplement (ONS) Orders: None  · ONS intake: Unable to assess  · Additional Calories: none  · Anthropometric Measures:  · Ht: 5' 3\" (160 cm)   · Current Body Wt: 110 lb 0.2 oz (49.9 kg)  · Admission Body Wt: 109 lb 2 oz (49.5 kg)  · Usual Body Wt: 125 lb 7.1 oz (56.9 kg)  · % Weight Change:  ,  12.8% x 8 months  · Ideal Body Wt: 124 lb (56.2 kg), % Ideal Body 87.9%  · BMI Classification: BMI 18.5 - 24.9 Normal Weight  · Comparative Standards (Estimated Nutrition Needs):  · Estimated Daily Total Kcal: 0404-5848  · Estimated Daily Protein (g): 56-78(1-1.4)  · Estimated Daily Fluids (mL): +1750 ml+>1750 ml    Estimated Intake vs Estimated Needs:  Does not meet    Nutrition Risk Level: Moderate    Nutrition Interventions:   Continue current diet  Continued Inpatient Monitoring, Education Completed, Coordination of Care    Nutrition Evaluation:   · Evaluation: Goals set   · Goals: PO >75% of meals with good GI tolerance    · Monitoring: Nutrition Progression, Supplement Intake, Diet Tolerance, Weight, Pertinent Labs, Patient/Family Education    See Adult Nutrition Doc Flowsheet for more detail.      Electronically signed by Lucas LEAVITT RDN, JUMA on 7/8/2019 at 9:51 AM    Contact Number:

## 2019-07-09 ENCOUNTER — CARE COORDINATION (OUTPATIENT)
Dept: CASE MANAGEMENT | Age: 84
End: 2019-07-09

## 2019-07-10 ENCOUNTER — CARE COORDINATION (OUTPATIENT)
Dept: CASE MANAGEMENT | Age: 84
End: 2019-07-10

## 2019-07-11 ENCOUNTER — CARE COORDINATION (OUTPATIENT)
Dept: CASE MANAGEMENT | Age: 84
End: 2019-07-11

## 2019-07-12 ENCOUNTER — CARE COORDINATION (OUTPATIENT)
Dept: CARE COORDINATION | Age: 84
End: 2019-07-12

## 2019-07-15 ENCOUNTER — OFFICE VISIT (OUTPATIENT)
Dept: FAMILY MEDICINE CLINIC | Age: 84
End: 2019-07-15
Payer: MEDICARE

## 2019-07-15 ENCOUNTER — HOSPITAL ENCOUNTER (OUTPATIENT)
Age: 84
Discharge: HOME | End: 2019-07-15
Payer: MEDICARE

## 2019-07-15 VITALS
SYSTOLIC BLOOD PRESSURE: 126 MMHG | DIASTOLIC BLOOD PRESSURE: 68 MMHG | WEIGHT: 111 LBS | BODY MASS INDEX: 19.67 KG/M2 | HEIGHT: 63 IN

## 2019-07-15 DIAGNOSIS — R10.9 ABDOMINAL PAIN, UNSPECIFIED ABDOMINAL LOCATION: Primary | ICD-10-CM

## 2019-07-15 DIAGNOSIS — K59.4 RECTAL SPASM: ICD-10-CM

## 2019-07-15 DIAGNOSIS — R10.9 ABDOMINAL PAIN, UNSPECIFIED ABDOMINAL LOCATION: ICD-10-CM

## 2019-07-15 DIAGNOSIS — I10 ESSENTIAL HYPERTENSION: ICD-10-CM

## 2019-07-15 DIAGNOSIS — I48.20 CHRONIC ATRIAL FIBRILLATION (HCC): ICD-10-CM

## 2019-07-15 LAB
HIGH SENSITIVE C-REACTIVE PROTEIN: 5.1 MG/L
SEDIMENTATION RATE, ERYTHROCYTE: 29 MM (ref 0–20)
TOTAL CK: 28 U/L (ref 26–192)

## 2019-07-15 PROCEDURE — 1111F DSCHRG MED/CURRENT MED MERGE: CPT | Performed by: FAMILY MEDICINE

## 2019-07-15 PROCEDURE — G8427 DOCREV CUR MEDS BY ELIG CLIN: HCPCS | Performed by: FAMILY MEDICINE

## 2019-07-15 PROCEDURE — G8420 CALC BMI NORM PARAMETERS: HCPCS | Performed by: FAMILY MEDICINE

## 2019-07-15 PROCEDURE — 82550 ASSAY OF CK (CPK): CPT

## 2019-07-15 PROCEDURE — G8598 ASA/ANTIPLAT THER USED: HCPCS | Performed by: FAMILY MEDICINE

## 2019-07-15 PROCEDURE — 1090F PRES/ABSN URINE INCON ASSESS: CPT | Performed by: FAMILY MEDICINE

## 2019-07-15 PROCEDURE — 1123F ACP DISCUSS/DSCN MKR DOCD: CPT | Performed by: FAMILY MEDICINE

## 2019-07-15 PROCEDURE — 1036F TOBACCO NON-USER: CPT | Performed by: FAMILY MEDICINE

## 2019-07-15 PROCEDURE — 36415 COLL VENOUS BLD VENIPUNCTURE: CPT

## 2019-07-15 PROCEDURE — 4040F PNEUMOC VAC/ADMIN/RCVD: CPT | Performed by: FAMILY MEDICINE

## 2019-07-15 PROCEDURE — 99214 OFFICE O/P EST MOD 30 MIN: CPT | Performed by: FAMILY MEDICINE

## 2019-07-15 PROCEDURE — 85651 RBC SED RATE NONAUTOMATED: CPT

## 2019-07-15 PROCEDURE — 86141 C-REACTIVE PROTEIN HS: CPT

## 2019-07-15 RX ORDER — ATROPA BELLADONNA AND OPIUM 16.2; 6 MG/1; MG/1
60 SUPPOSITORY RECTAL EVERY 8 HOURS PRN
Qty: 6 SUPPOSITORY | Refills: 0 | Status: ON HOLD | OUTPATIENT
Start: 2019-07-15 | End: 2019-11-04

## 2019-07-15 RX ORDER — HYDROCODONE BITARTRATE AND ACETAMINOPHEN 5; 325 MG/1; MG/1
1-2 TABLET ORAL EVERY 4 HOURS PRN
COMMUNITY
End: 2019-08-05 | Stop reason: SDUPTHER

## 2019-07-15 NOTE — PROGRESS NOTES
Denies shortness of breath. Denies wheezing. Cardiovascular: Denies chest pain at rest. Denies irregular heartbeat. Denies palpitations. Gastrointestinal: Admits abdominal pain, intense cramping, most recent episode came on several minutes after a large, easy to pass bowel movement. Denies blood in the stool. Denies constipation. Denies diarrhea. Denies nausea. Denies vomiting. Genitourinary: Denies blood in the urine. Denies difficulty urinating. Denies frequent urination. Denies painful urination. Denies urinary incontinence. Musculoskeletal: Denies muscle aches. Denies painful joints. Denies swollen joints. Peripheral Vascular: Denies pain/cramping in legs after exertion. Skin: Denies dry skin. Denies itching. Denies rash. Neurologic: Denies falls. Denies dizziness. Denies fainting. Denies tingling/numbness. Psychiatric: Denies sleep disturbance. Denies anxiety. Denies depressed mood. Past Surgical History:   Procedure Laterality Date    CARDIAC SURGERY      CATARACT REMOVAL  10/2015    Dr. Short Fears Bilateral 10/2014    Cataracts Surgery       Family History   Problem Relation Age of Onset    High Blood Pressure Mother     Heart Failure Mother     Other Mother         DJD    Stroke Father     Heart Disease Sister     High Blood Pressure Sister     High Cholesterol Sister     Cancer Sister         metastatic endometrial CA, Cause of death    Other Brother         CAD       Past Medical History:   Diagnosis Date    Abnormal echocardiogram 5/15/12    EF >55%. mild diastolic dysfunction. RVSP 70 mmHg on echocardiogram. the right ventricle is moderately to severely enlarged with preserved function. Severe tricuspid regurgitation    Abnormal resting ECG findings 6/19/12    sinus bradycardia 55 beats per minute. Incomplete right bundle branch block. Evidence of left atrial enlargement.  Abnormal ECG a    Allergic rhinitis 4/17/2017    Anxiety     ASHD (arteriosclerotic heart disease)

## 2019-07-16 ENCOUNTER — CARE COORDINATION (OUTPATIENT)
Dept: CARE COORDINATION | Age: 84
End: 2019-07-16

## 2019-07-17 ENCOUNTER — CARE COORDINATION (OUTPATIENT)
Dept: CASE MANAGEMENT | Age: 84
End: 2019-07-17

## 2019-07-19 ENCOUNTER — CARE COORDINATION (OUTPATIENT)
Dept: CASE MANAGEMENT | Age: 84
End: 2019-07-19

## 2019-08-05 DIAGNOSIS — R10.9 ABDOMINAL PAIN, UNSPECIFIED ABDOMINAL LOCATION: Primary | ICD-10-CM

## 2019-08-05 RX ORDER — HYDROCODONE BITARTRATE AND ACETAMINOPHEN 5; 325 MG/1; MG/1
1-2 TABLET ORAL EVERY 4 HOURS PRN
Qty: 30 TABLET | Refills: 0 | Status: SHIPPED | OUTPATIENT
Start: 2019-08-05 | End: 2019-09-17 | Stop reason: SDUPTHER

## 2019-08-05 NOTE — TELEPHONE ENCOUNTER
Controlled Substance Monitoring:    Acute and Chronic Pain Monitoring:   RX Monitoring 8/5/2019   Attestation -   Periodic Controlled Substance Monitoring No signs of potential drug abuse or diversion identified.
([facility] [address]     Allergic rhinitis     Anemia due to GI blood loss     Diverticulosis of intestine without bleeding     Iron deficiency anemia     PAC (premature atrial contraction)     Pulmonary hypertension (HCC)     Sepsis (HCC)     Mitral valve insufficiency     Reactive airway disease without complication     Rectal spasm     Chronic atrial fibrillation (HCC)     Diverticulitis     Acute diverticulitis     Acute on chronic diastolic CHF (congestive heart failure), NYHA class 3 (HCC)     Chronic anticoagulation     Orthostasis     Diverticulosis of large intestine without hemorrhage     Abdominal pain, LLQ     Chronic idiopathic constipation     Moderate malnutrition (HCC)     Constipation     Abdominal pain, RLQ

## 2019-09-16 DIAGNOSIS — R10.9 ABDOMINAL PAIN, UNSPECIFIED ABDOMINAL LOCATION: ICD-10-CM

## 2019-09-17 RX ORDER — HYDROCODONE BITARTRATE AND ACETAMINOPHEN 5; 325 MG/1; MG/1
1-2 TABLET ORAL EVERY 4 HOURS PRN
Qty: 30 TABLET | Refills: 0 | Status: SHIPPED | OUTPATIENT
Start: 2019-09-17 | End: 2019-09-22

## 2019-09-17 NOTE — TELEPHONE ENCOUNTER
Controlled Substance Monitoring:    Acute and Chronic Pain Monitoring:   RX Monitoring 8/5/2019   Attestation -   Periodic Controlled Substance Monitoring No signs of potential drug abuse or diversion identified.

## 2019-09-23 ENCOUNTER — TELEPHONE (OUTPATIENT)
Dept: FAMILY MEDICINE CLINIC | Age: 84
End: 2019-09-23

## 2019-09-23 ENCOUNTER — HOSPITAL ENCOUNTER (OUTPATIENT)
Age: 84
Discharge: HOME | End: 2019-09-23
Payer: MEDICARE

## 2019-09-23 ENCOUNTER — OFFICE VISIT (OUTPATIENT)
Dept: FAMILY MEDICINE CLINIC | Age: 84
End: 2019-09-23
Payer: MEDICARE

## 2019-09-23 VITALS
HEIGHT: 63 IN | WEIGHT: 102 LBS | TEMPERATURE: 99.1 F | DIASTOLIC BLOOD PRESSURE: 72 MMHG | SYSTOLIC BLOOD PRESSURE: 104 MMHG | BODY MASS INDEX: 18.07 KG/M2

## 2019-09-23 DIAGNOSIS — I27.20 PULMONARY HYPERTENSION (HCC): ICD-10-CM

## 2019-09-23 DIAGNOSIS — R63.4 WEIGHT LOSS: Primary | ICD-10-CM

## 2019-09-23 DIAGNOSIS — K59.00 CONSTIPATION, UNSPECIFIED CONSTIPATION TYPE: ICD-10-CM

## 2019-09-23 DIAGNOSIS — R10.32 ABDOMINAL PAIN, LLQ: ICD-10-CM

## 2019-09-23 DIAGNOSIS — E44.0 MODERATE MALNUTRITION (HCC): ICD-10-CM

## 2019-09-23 DIAGNOSIS — I48.20 CHRONIC ATRIAL FIBRILLATION (HCC): ICD-10-CM

## 2019-09-23 DIAGNOSIS — R63.4 WEIGHT LOSS: ICD-10-CM

## 2019-09-23 DIAGNOSIS — R53.83 FATIGUE, UNSPECIFIED TYPE: ICD-10-CM

## 2019-09-23 LAB
ABSOLUTE EOS #: 0.25 K/UL (ref 0–0.44)
ABSOLUTE IMMATURE GRANULOCYTE: <0.03 K/UL (ref 0–0.3)
ABSOLUTE LYMPH #: 2.13 K/UL (ref 1.1–3.7)
ABSOLUTE MONO #: 0.83 K/UL (ref 0.1–1.2)
ALBUMIN SERPL-MCNC: 4 G/DL (ref 3.5–5.2)
ALBUMIN/GLOBULIN RATIO: 1.1 (ref 1–2.5)
ALP BLD-CCNC: 136 U/L (ref 35–104)
ALT SERPL-CCNC: 8 U/L (ref 5–33)
ANION GAP SERPL CALCULATED.3IONS-SCNC: 14 MMOL/L (ref 9–17)
AST SERPL-CCNC: 18 U/L
BACTERIA URINE, POC: 0
BASOPHILS # BLD: 1 % (ref 0–2)
BASOPHILS ABSOLUTE: 0.05 K/UL (ref 0–0.2)
BILIRUB SERPL-MCNC: 0.58 MG/DL (ref 0.3–1.2)
BILIRUBIN URINE: 0 MG/DL
BLOOD, URINE: POSITIVE
BUN BLDV-MCNC: 22 MG/DL (ref 8–23)
BUN/CREAT BLD: 18 (ref 9–20)
CALCIUM SERPL-MCNC: 10.2 MG/DL (ref 8.6–10.4)
CASTS URINE, POC: 0
CHLORIDE BLD-SCNC: 99 MMOL/L (ref 98–107)
CLARITY: CLEAR
CO2: 26 MMOL/L (ref 20–31)
COLOR: YELLOW
CREAT SERPL-MCNC: 1.25 MG/DL (ref 0.5–0.9)
CRYSTALS URINE, POC: 0
DIFFERENTIAL TYPE: NORMAL
EOSINOPHILS RELATIVE PERCENT: 3 % (ref 1–4)
EPI CELLS URINE, POC: NORMAL
GFR AFRICAN AMERICAN: 49 ML/MIN
GFR NON-AFRICAN AMERICAN: 40 ML/MIN
GFR SERPL CREATININE-BSD FRML MDRD: ABNORMAL ML/MIN/{1.73_M2}
GFR SERPL CREATININE-BSD FRML MDRD: ABNORMAL ML/MIN/{1.73_M2}
GLUCOSE BLD-MCNC: 106 MG/DL (ref 70–99)
GLUCOSE URINE: NEGATIVE
HCT VFR BLD CALC: 42.5 % (ref 36.3–47.1)
HEMOGLOBIN: 13.8 G/DL (ref 11.9–15.1)
IMMATURE GRANULOCYTES: 0 %
KETONES, URINE: NEGATIVE
LEUKOCYTE EST, POC: NEGATIVE
LYMPHOCYTES # BLD: 28 % (ref 24–43)
MCH RBC QN AUTO: 32.2 PG (ref 25.2–33.5)
MCHC RBC AUTO-ENTMCNC: 32.5 G/DL (ref 28.4–34.8)
MCV RBC AUTO: 99.1 FL (ref 82.6–102.9)
MONOCYTES # BLD: 11 % (ref 3–12)
NITRITE, URINE: NEGATIVE
NRBC AUTOMATED: 0 PER 100 WBC
PDW BLD-RTO: 13.4 % (ref 11.8–14.4)
PH UA: 5 (ref 4.5–8)
PLATELET # BLD: 303 K/UL (ref 138–453)
PLATELET ESTIMATE: NORMAL
PMV BLD AUTO: 11.4 FL (ref 8.1–13.5)
POTASSIUM SERPL-SCNC: 5.3 MMOL/L (ref 3.7–5.3)
PROTEIN UA: NEGATIVE
RBC # BLD: 4.29 M/UL (ref 3.95–5.11)
RBC # BLD: NORMAL 10*6/UL
RBC URINE, POC: NORMAL
SEG NEUTROPHILS: 57 % (ref 36–65)
SEGMENTED NEUTROPHILS ABSOLUTE COUNT: 4.45 K/UL (ref 1.5–8.1)
SODIUM BLD-SCNC: 139 MMOL/L (ref 135–144)
SPECIFIC GRAVITY UA: 1.01 (ref 1–1.03)
TOTAL PROTEIN: 7.6 G/DL (ref 6.4–8.3)
UROBILINOGEN, URINE: NORMAL
WBC # BLD: 7.7 K/UL (ref 3.5–11.3)
WBC # BLD: NORMAL 10*3/UL
WBC URINE, POC: NORMAL
YEAST URINE, POC: 0

## 2019-09-23 PROCEDURE — G8428 CUR MEDS NOT DOCUMENT: HCPCS | Performed by: FAMILY MEDICINE

## 2019-09-23 PROCEDURE — G8598 ASA/ANTIPLAT THER USED: HCPCS | Performed by: FAMILY MEDICINE

## 2019-09-23 PROCEDURE — 4040F PNEUMOC VAC/ADMIN/RCVD: CPT | Performed by: FAMILY MEDICINE

## 2019-09-23 PROCEDURE — G8419 CALC BMI OUT NRM PARAM NOF/U: HCPCS | Performed by: FAMILY MEDICINE

## 2019-09-23 PROCEDURE — 99214 OFFICE O/P EST MOD 30 MIN: CPT | Performed by: FAMILY MEDICINE

## 2019-09-23 PROCEDURE — 1090F PRES/ABSN URINE INCON ASSESS: CPT | Performed by: FAMILY MEDICINE

## 2019-09-23 PROCEDURE — 85025 COMPLETE CBC W/AUTO DIFF WBC: CPT

## 2019-09-23 PROCEDURE — 81000 URINALYSIS NONAUTO W/SCOPE: CPT | Performed by: FAMILY MEDICINE

## 2019-09-23 PROCEDURE — 1036F TOBACCO NON-USER: CPT | Performed by: FAMILY MEDICINE

## 2019-09-23 PROCEDURE — 87086 URINE CULTURE/COLONY COUNT: CPT

## 2019-09-23 PROCEDURE — 80053 COMPREHEN METABOLIC PANEL: CPT

## 2019-09-23 PROCEDURE — 1123F ACP DISCUSS/DSCN MKR DOCD: CPT | Performed by: FAMILY MEDICINE

## 2019-09-23 PROCEDURE — 36415 COLL VENOUS BLD VENIPUNCTURE: CPT

## 2019-09-23 RX ORDER — CYPROHEPTADINE HYDROCHLORIDE 4 MG/1
4 TABLET ORAL DAILY
Qty: 30 TABLET | Refills: 2 | Status: ON HOLD | OUTPATIENT
Start: 2019-09-23 | End: 2019-11-04

## 2019-09-23 NOTE — TELEPHONE ENCOUNTER
----- Message from Saul Bain MD sent at 9/23/2019  4:07 PM EDT -----  Notify she is a bit dehydrated but overalll lab is not as bad as I thouught it might be  She should try to increase her Boost intake  She can try taking periactin 4mg daily as an appetite enhancer

## 2019-09-25 LAB
CULTURE: NORMAL
Lab: NORMAL
SPECIMEN DESCRIPTION: NORMAL

## 2019-09-30 ENCOUNTER — TELEPHONE (OUTPATIENT)
Dept: FAMILY MEDICINE CLINIC | Age: 84
End: 2019-09-30

## 2019-09-30 NOTE — TELEPHONE ENCOUNTER
Pt called because her last RX for eliquis was 145 and she can't afford that. She is beside herself and doesn't know what to do. Is there something else she can take or can she stop taking this?

## 2019-10-01 ENCOUNTER — TELEPHONE (OUTPATIENT)
Dept: CARDIOLOGY | Age: 84
End: 2019-10-01

## 2019-10-03 DIAGNOSIS — E87.6 LOW SERUM POTASSIUM LEVEL: ICD-10-CM

## 2019-10-04 RX ORDER — SPIRONOLACTONE 25 MG/1
TABLET ORAL
Qty: 90 TABLET | Refills: 2 | Status: ON HOLD | OUTPATIENT
Start: 2019-10-04 | End: 2019-11-04

## 2019-11-04 ENCOUNTER — APPOINTMENT (OUTPATIENT)
Dept: CT IMAGING | Age: 84
End: 2019-11-04
Payer: MEDICARE

## 2019-11-04 ENCOUNTER — HOSPITAL ENCOUNTER (INPATIENT)
Age: 84
LOS: 2 days | Discharge: HOSPICE | DRG: 065 | End: 2019-11-06
Attending: PSYCHIATRY & NEUROLOGY | Admitting: PSYCHIATRY & NEUROLOGY
Payer: MEDICARE

## 2019-11-04 ENCOUNTER — HOSPITAL ENCOUNTER (EMERGENCY)
Age: 84
Discharge: ANOTHER HOSPITAL | End: 2019-11-04
Attending: EMERGENCY MEDICINE
Payer: MEDICARE

## 2019-11-04 VITALS
OXYGEN SATURATION: 97 % | RESPIRATION RATE: 24 BRPM | BODY MASS INDEX: 16.83 KG/M2 | WEIGHT: 95 LBS | HEART RATE: 69 BPM | DIASTOLIC BLOOD PRESSURE: 78 MMHG | SYSTOLIC BLOOD PRESSURE: 107 MMHG

## 2019-11-04 DIAGNOSIS — I63.511 ACUTE ISCHEMIC RIGHT MCA STROKE (HCC): Primary | ICD-10-CM

## 2019-11-04 PROBLEM — I63.9 ACUTE CVA (CEREBROVASCULAR ACCIDENT) (HCC): Status: ACTIVE | Noted: 2019-11-04

## 2019-11-04 LAB
ABSOLUTE EOS #: 0.29 K/UL (ref 0–0.44)
ABSOLUTE IMMATURE GRANULOCYTE: 0.03 K/UL (ref 0–0.3)
ABSOLUTE LYMPH #: 1.36 K/UL (ref 1.1–3.7)
ABSOLUTE MONO #: 0.63 K/UL (ref 0.1–1.2)
ALBUMIN SERPL-MCNC: 3.2 G/DL (ref 3.5–5.2)
ALBUMIN/GLOBULIN RATIO: 1.1 (ref 1–2.5)
ALP BLD-CCNC: 94 U/L (ref 35–104)
ALT SERPL-CCNC: 15 U/L (ref 5–33)
ANION GAP SERPL CALCULATED.3IONS-SCNC: 15 MMOL/L (ref 9–17)
AST SERPL-CCNC: 31 U/L
BASOPHILS # BLD: 1 % (ref 0–2)
BASOPHILS ABSOLUTE: 0.04 K/UL (ref 0–0.2)
BILIRUB SERPL-MCNC: 0.53 MG/DL (ref 0.3–1.2)
BUN BLDV-MCNC: 20 MG/DL (ref 8–23)
BUN/CREAT BLD: 22 (ref 9–20)
CALCIUM SERPL-MCNC: 8.7 MG/DL (ref 8.6–10.4)
CHLORIDE BLD-SCNC: 98 MMOL/L (ref 98–107)
CO2: 24 MMOL/L (ref 20–31)
CREAT SERPL-MCNC: 0.91 MG/DL (ref 0.5–0.9)
DIFFERENTIAL TYPE: ABNORMAL
EOSINOPHILS RELATIVE PERCENT: 3 % (ref 1–4)
GFR AFRICAN AMERICAN: >60 ML/MIN
GFR NON-AFRICAN AMERICAN: 58 ML/MIN
GFR SERPL CREATININE-BSD FRML MDRD: ABNORMAL ML/MIN/{1.73_M2}
GFR SERPL CREATININE-BSD FRML MDRD: ABNORMAL ML/MIN/{1.73_M2}
GLUCOSE BLD-MCNC: 139 MG/DL (ref 70–99)
GLUCOSE BLD-MCNC: 98 MG/DL (ref 74–100)
HCT VFR BLD CALC: 39.5 % (ref 36.3–47.1)
HEMOGLOBIN: 12.9 G/DL (ref 11.9–15.1)
IMMATURE GRANULOCYTES: 0 %
INR BLD: 1.1 (ref 0.9–1.2)
LYMPHOCYTES # BLD: 15 % (ref 24–43)
MAGNESIUM: 1.8 MG/DL (ref 1.6–2.6)
MCH RBC QN AUTO: 31.5 PG (ref 25.2–33.5)
MCHC RBC AUTO-ENTMCNC: 32.7 G/DL (ref 28.4–34.8)
MCV RBC AUTO: 96.3 FL (ref 82.6–102.9)
MONOCYTES # BLD: 7 % (ref 3–12)
NRBC AUTOMATED: 0 PER 100 WBC
PARTIAL THROMBOPLASTIN TIME: 30.8 SEC (ref 23.2–34.4)
PDW BLD-RTO: 14.2 % (ref 11.8–14.4)
PLATELET # BLD: 194 K/UL (ref 138–453)
PLATELET ESTIMATE: ABNORMAL
PMV BLD AUTO: 11.8 FL (ref 8.1–13.5)
POTASSIUM SERPL-SCNC: 2.8 MMOL/L (ref 3.7–5.3)
PROTHROMBIN TIME: 11.4 SEC (ref 9.7–12.2)
RBC # BLD: 4.1 M/UL (ref 3.95–5.11)
RBC # BLD: ABNORMAL 10*6/UL
SEG NEUTROPHILS: 74 % (ref 36–65)
SEGMENTED NEUTROPHILS ABSOLUTE COUNT: 6.52 K/UL (ref 1.5–8.1)
SODIUM BLD-SCNC: 137 MMOL/L (ref 135–144)
TOTAL PROTEIN: 6.2 G/DL (ref 6.4–8.3)
WBC # BLD: 8.9 K/UL (ref 3.5–11.3)
WBC # BLD: ABNORMAL 10*3/UL

## 2019-11-04 PROCEDURE — 96365 THER/PROPH/DIAG IV INF INIT: CPT

## 2019-11-04 PROCEDURE — 85610 PROTHROMBIN TIME: CPT

## 2019-11-04 PROCEDURE — 93005 ELECTROCARDIOGRAM TRACING: CPT | Performed by: EMERGENCY MEDICINE

## 2019-11-04 PROCEDURE — 37195 THROMBOLYTIC THERAPY STROKE: CPT

## 2019-11-04 PROCEDURE — 36415 COLL VENOUS BLD VENIPUNCTURE: CPT

## 2019-11-04 PROCEDURE — 2580000003 HC RX 258: Performed by: EMERGENCY MEDICINE

## 2019-11-04 PROCEDURE — 99291 CRITICAL CARE FIRST HOUR: CPT | Performed by: PSYCHIATRY & NEUROLOGY

## 2019-11-04 PROCEDURE — 85730 THROMBOPLASTIN TIME PARTIAL: CPT

## 2019-11-04 PROCEDURE — 99223 1ST HOSP IP/OBS HIGH 75: CPT | Performed by: PSYCHIATRY & NEUROLOGY

## 2019-11-04 PROCEDURE — 96376 TX/PRO/DX INJ SAME DRUG ADON: CPT

## 2019-11-04 PROCEDURE — 85025 COMPLETE CBC W/AUTO DIFF WBC: CPT

## 2019-11-04 PROCEDURE — 80053 COMPREHEN METABOLIC PANEL: CPT

## 2019-11-04 PROCEDURE — 6360000002 HC RX W HCPCS: Performed by: STUDENT IN AN ORGANIZED HEALTH CARE EDUCATION/TRAINING PROGRAM

## 2019-11-04 PROCEDURE — 82947 ASSAY GLUCOSE BLOOD QUANT: CPT

## 2019-11-04 PROCEDURE — 96375 TX/PRO/DX INJ NEW DRUG ADDON: CPT

## 2019-11-04 PROCEDURE — 99285 EMERGENCY DEPT VISIT HI MDM: CPT

## 2019-11-04 PROCEDURE — 83735 ASSAY OF MAGNESIUM: CPT

## 2019-11-04 PROCEDURE — 2000000003 HC NEURO ICU R&B

## 2019-11-04 PROCEDURE — 70450 CT HEAD/BRAIN W/O DYE: CPT

## 2019-11-04 PROCEDURE — 6360000002 HC RX W HCPCS: Performed by: EMERGENCY MEDICINE

## 2019-11-04 RX ORDER — ONDANSETRON 4 MG/1
4 TABLET, ORALLY DISINTEGRATING ORAL EVERY 6 HOURS PRN
Status: ON HOLD | COMMUNITY
End: 2019-11-06 | Stop reason: HOSPADM

## 2019-11-04 RX ORDER — MORPHINE SULFATE 2 MG/ML
2 INJECTION, SOLUTION INTRAMUSCULAR; INTRAVENOUS EVERY 4 HOURS PRN
Status: DISCONTINUED | OUTPATIENT
Start: 2019-11-04 | End: 2019-11-06 | Stop reason: HOSPADM

## 2019-11-04 RX ORDER — SUCRALFATE ORAL 1 G/10ML
1 SUSPENSION ORAL EVERY 6 HOURS PRN
Status: ON HOLD | COMMUNITY
End: 2019-11-06 | Stop reason: HOSPADM

## 2019-11-04 RX ORDER — 0.9 % SODIUM CHLORIDE 0.9 %
50 INTRAVENOUS SOLUTION INTRAVENOUS ONCE
Status: COMPLETED | OUTPATIENT
Start: 2019-11-04 | End: 2019-11-04

## 2019-11-04 RX ORDER — SODIUM CHLORIDE 0.9 % (FLUSH) 0.9 %
10 SYRINGE (ML) INJECTION PRN
Status: DISCONTINUED | OUTPATIENT
Start: 2019-11-04 | End: 2019-11-04 | Stop reason: HOSPADM

## 2019-11-04 RX ORDER — MORPHINE SULFATE 100 MG/5ML
5 SOLUTION ORAL
Status: ON HOLD | COMMUNITY
End: 2019-11-06 | Stop reason: HOSPADM

## 2019-11-04 RX ORDER — HALOPERIDOL 5 MG/ML
2 INJECTION INTRAMUSCULAR EVERY 6 HOURS PRN
Status: DISCONTINUED | OUTPATIENT
Start: 2019-11-04 | End: 2019-11-06 | Stop reason: HOSPADM

## 2019-11-04 RX ORDER — ONDANSETRON 2 MG/ML
4 INJECTION INTRAMUSCULAR; INTRAVENOUS EVERY 6 HOURS PRN
Status: DISCONTINUED | OUTPATIENT
Start: 2019-11-04 | End: 2019-11-06 | Stop reason: HOSPADM

## 2019-11-04 RX ORDER — FUROSEMIDE 10 MG/ML
20 INJECTION INTRAMUSCULAR; INTRAVENOUS DAILY
Status: DISCONTINUED | OUTPATIENT
Start: 2019-11-05 | End: 2019-11-05

## 2019-11-04 RX ORDER — POTASSIUM CHLORIDE 7.45 MG/ML
10 INJECTION INTRAVENOUS ONCE
Status: COMPLETED | OUTPATIENT
Start: 2019-11-05 | End: 2019-11-04

## 2019-11-04 RX ORDER — LOPERAMIDE HYDROCHLORIDE 2 MG/1
4 CAPSULE ORAL PRN
Status: ON HOLD | COMMUNITY
End: 2019-11-06 | Stop reason: HOSPADM

## 2019-11-04 RX ORDER — MORPHINE SULFATE 2 MG/ML
2 INJECTION, SOLUTION INTRAMUSCULAR; INTRAVENOUS ONCE
Status: COMPLETED | OUTPATIENT
Start: 2019-11-04 | End: 2019-11-04

## 2019-11-04 RX ORDER — LORAZEPAM 2 MG/ML
0.5 INJECTION INTRAMUSCULAR EVERY 6 HOURS PRN
Status: DISCONTINUED | OUTPATIENT
Start: 2019-11-04 | End: 2019-11-06 | Stop reason: HOSPADM

## 2019-11-04 RX ORDER — SODIUM CHLORIDE 0.9 % (FLUSH) 0.9 %
10 SYRINGE (ML) INJECTION EVERY 12 HOURS SCHEDULED
Status: DISCONTINUED | OUTPATIENT
Start: 2019-11-05 | End: 2019-11-06 | Stop reason: HOSPADM

## 2019-11-04 RX ORDER — SODIUM CHLORIDE 0.9 % (FLUSH) 0.9 %
10 SYRINGE (ML) INJECTION PRN
Status: DISCONTINUED | OUTPATIENT
Start: 2019-11-04 | End: 2019-11-06 | Stop reason: HOSPADM

## 2019-11-04 RX ORDER — LOPERAMIDE HYDROCHLORIDE 2 MG/1
2 CAPSULE ORAL PRN
Status: ON HOLD | COMMUNITY
End: 2019-11-06 | Stop reason: HOSPADM

## 2019-11-04 RX ORDER — DEXTROSE MONOHYDRATE 25 G/50ML
12.5 INJECTION, SOLUTION INTRAVENOUS
Status: DISCONTINUED | OUTPATIENT
Start: 2019-11-04 | End: 2019-11-04 | Stop reason: HOSPADM

## 2019-11-04 RX ORDER — LORAZEPAM 0.5 MG/1
0.5 TABLET ORAL EVERY 6 HOURS PRN
Status: ON HOLD | COMMUNITY
End: 2019-11-06 | Stop reason: HOSPADM

## 2019-11-04 RX ORDER — HALOPERIDOL 1 MG/1
1 TABLET ORAL EVERY 6 HOURS PRN
Status: ON HOLD | COMMUNITY
End: 2019-11-06 | Stop reason: HOSPADM

## 2019-11-04 RX ORDER — SENNA AND DOCUSATE SODIUM 50; 8.6 MG/1; MG/1
1 TABLET, FILM COATED ORAL DAILY PRN
Status: ON HOLD | COMMUNITY
End: 2019-11-06 | Stop reason: HOSPADM

## 2019-11-04 RX ORDER — ASPIRIN 300 MG/1
300 SUPPOSITORY RECTAL DAILY
Status: DISCONTINUED | OUTPATIENT
Start: 2019-11-06 | End: 2019-11-05

## 2019-11-04 RX ORDER — ASPIRIN 81 MG/1
81 TABLET ORAL DAILY
Status: DISCONTINUED | OUTPATIENT
Start: 2019-11-06 | End: 2019-11-05

## 2019-11-04 RX ORDER — SODIUM CHLORIDE 0.9 % (FLUSH) 0.9 %
10 SYRINGE (ML) INJECTION EVERY 12 HOURS SCHEDULED
Status: DISCONTINUED | OUTPATIENT
Start: 2019-11-04 | End: 2019-11-04 | Stop reason: HOSPADM

## 2019-11-04 RX ADMIN — ALTEPLASE 3.9 MG: KIT at 16:41

## 2019-11-04 RX ADMIN — MORPHINE SULFATE 2 MG: 2 INJECTION, SOLUTION INTRAMUSCULAR; INTRAVENOUS at 19:15

## 2019-11-04 RX ADMIN — POTASSIUM CHLORIDE 10 MEQ: 7.46 INJECTION, SOLUTION INTRAVENOUS at 23:54

## 2019-11-04 RX ADMIN — SODIUM CHLORIDE 50 ML: 9 INJECTION, SOLUTION INTRAVENOUS at 16:49

## 2019-11-04 RX ADMIN — ALTEPLASE 34.9 MG: KIT at 16:43

## 2019-11-04 ASSESSMENT — PAIN SCALES - PAIN ASSESSMENT IN ADVANCED DEMENTIA (PAINAD)
NEGVOCALIZATION: 0
TOTALSCORE: 0
CONSOLABILITY: 0
BODYLANGUAGE: 0
FACIALEXPRESSION: 0
BREATHING: 0

## 2019-11-04 ASSESSMENT — PAIN SCALES - GENERAL: PAINLEVEL_OUTOF10: 6

## 2019-11-05 ENCOUNTER — APPOINTMENT (OUTPATIENT)
Dept: CT IMAGING | Age: 84
DRG: 065 | End: 2019-11-05
Attending: PSYCHIATRY & NEUROLOGY
Payer: MEDICARE

## 2019-11-05 ENCOUNTER — APPOINTMENT (OUTPATIENT)
Dept: GENERAL RADIOLOGY | Age: 84
DRG: 065 | End: 2019-11-05
Attending: PSYCHIATRY & NEUROLOGY
Payer: MEDICARE

## 2019-11-05 LAB
DIRECT EXAM: NORMAL
EKG ATRIAL RATE: 58 BPM
EKG Q-T INTERVAL: 376 MS
EKG QRS DURATION: 98 MS
EKG QTC CALCULATION (BAZETT): 384 MS
EKG R AXIS: 109 DEGREES
EKG T AXIS: -59 DEGREES
EKG VENTRICULAR RATE: 63 BPM
GLUCOSE BLD-MCNC: 136 MG/DL (ref 65–105)
Lab: NORMAL
SPECIMEN DESCRIPTION: NORMAL

## 2019-11-05 PROCEDURE — 51798 US URINE CAPACITY MEASURE: CPT

## 2019-11-05 PROCEDURE — 99222 1ST HOSP IP/OBS MODERATE 55: CPT | Performed by: NURSE PRACTITIONER

## 2019-11-05 PROCEDURE — 1200000000 HC SEMI PRIVATE

## 2019-11-05 PROCEDURE — 92611 MOTION FLUOROSCOPY/SWALLOW: CPT

## 2019-11-05 PROCEDURE — 2580000003 HC RX 258: Performed by: STUDENT IN AN ORGANIZED HEALTH CARE EDUCATION/TRAINING PROGRAM

## 2019-11-05 PROCEDURE — 82947 ASSAY GLUCOSE BLOOD QUANT: CPT

## 2019-11-05 PROCEDURE — 87641 MR-STAPH DNA AMP PROBE: CPT

## 2019-11-05 PROCEDURE — 51701 INSERT BLADDER CATHETER: CPT

## 2019-11-05 PROCEDURE — 74230 X-RAY XM SWLNG FUNCJ C+: CPT

## 2019-11-05 PROCEDURE — 93010 ELECTROCARDIOGRAM REPORT: CPT | Performed by: INTERNAL MEDICINE

## 2019-11-05 PROCEDURE — 92610 EVALUATE SWALLOWING FUNCTION: CPT

## 2019-11-05 PROCEDURE — 6360000002 HC RX W HCPCS: Performed by: STUDENT IN AN ORGANIZED HEALTH CARE EDUCATION/TRAINING PROGRAM

## 2019-11-05 PROCEDURE — 70450 CT HEAD/BRAIN W/O DYE: CPT

## 2019-11-05 RX ORDER — SODIUM CHLORIDE 9 MG/ML
INJECTION, SOLUTION INTRAVENOUS CONTINUOUS
Status: DISCONTINUED | OUTPATIENT
Start: 2019-11-05 | End: 2019-11-06 | Stop reason: HOSPADM

## 2019-11-05 RX ADMIN — SODIUM CHLORIDE: 9 INJECTION, SOLUTION INTRAVENOUS at 21:05

## 2019-11-05 RX ADMIN — SODIUM CHLORIDE, PRESERVATIVE FREE 10 ML: 5 INJECTION INTRAVENOUS at 08:38

## 2019-11-05 RX ADMIN — Medication 10 ML: at 08:11

## 2019-11-05 RX ADMIN — FUROSEMIDE 20 MG: 10 INJECTION, SOLUTION INTRAMUSCULAR; INTRAVENOUS at 08:11

## 2019-11-05 ASSESSMENT — PAIN SCALES - PAIN ASSESSMENT IN ADVANCED DEMENTIA (PAINAD)
FACIALEXPRESSION: 0
BODYLANGUAGE: 0
BREATHING: 0
CONSOLABILITY: 0
TOTALSCORE: 0
NEGVOCALIZATION: 0

## 2019-11-05 ASSESSMENT — PAIN SCALES - GENERAL: PAINLEVEL_OUTOF10: 0

## 2019-11-05 ASSESSMENT — ENCOUNTER SYMPTOMS: GASTROINTESTINAL NEGATIVE: 1

## 2019-11-06 VITALS
OXYGEN SATURATION: 96 % | DIASTOLIC BLOOD PRESSURE: 82 MMHG | RESPIRATION RATE: 28 BRPM | TEMPERATURE: 98.1 F | WEIGHT: 88.6 LBS | HEART RATE: 74 BPM | BODY MASS INDEX: 15.7 KG/M2 | HEIGHT: 63 IN | SYSTOLIC BLOOD PRESSURE: 133 MMHG

## 2019-11-06 PROCEDURE — 99239 HOSP IP/OBS DSCHRG MGMT >30: CPT | Performed by: NURSE PRACTITIONER

## 2019-11-13 ENCOUNTER — HOSPITAL ENCOUNTER (OUTPATIENT)
Age: 84
Setting detail: SPECIMEN
Discharge: HOME | End: 2019-11-13
Payer: MEDICARE

## 2019-11-14 ENCOUNTER — HOSPITAL ENCOUNTER (OUTPATIENT)
Age: 84
Setting detail: SPECIMEN
Discharge: HOME | End: 2019-11-14
Payer: MEDICARE

## 2019-11-14 LAB
-: ABNORMAL
AMORPHOUS: ABNORMAL
BACTERIA: ABNORMAL
BILIRUBIN URINE: NEGATIVE
CASTS UA: ABNORMAL /LPF
COLOR: YELLOW
COMMENT UA: ABNORMAL
CRYSTALS, UA: ABNORMAL /HPF
EPITHELIAL CELLS UA: ABNORMAL /HPF (ref 0–25)
GLUCOSE URINE: NEGATIVE
KETONES, URINE: NEGATIVE
LEUKOCYTE ESTERASE, URINE: ABNORMAL
MUCUS: ABNORMAL
NITRITE, URINE: POSITIVE
OTHER OBSERVATIONS UA: ABNORMAL
PH UA: 5.5 (ref 5–9)
PROTEIN UA: ABNORMAL
RBC UA: ABNORMAL /HPF (ref 0–2)
RENAL EPITHELIAL, UA: ABNORMAL /HPF
SPECIFIC GRAVITY UA: 1.02 (ref 1.01–1.02)
TRICHOMONAS: ABNORMAL
TURBIDITY: ABNORMAL
URINE HGB: ABNORMAL
UROBILINOGEN, URINE: NORMAL
WBC UA: ABNORMAL /HPF (ref 0–5)
YEAST: ABNORMAL

## 2019-11-14 PROCEDURE — 87077 CULTURE AEROBIC IDENTIFY: CPT

## 2019-11-14 PROCEDURE — 87086 URINE CULTURE/COLONY COUNT: CPT

## 2019-11-14 PROCEDURE — 81001 URINALYSIS AUTO W/SCOPE: CPT

## 2019-11-14 PROCEDURE — 87186 SC STD MICRODIL/AGAR DIL: CPT

## 2019-11-15 ENCOUNTER — HOSPITAL ENCOUNTER (OUTPATIENT)
Age: 84
Setting detail: SPECIMEN
Discharge: HOME | End: 2019-11-15
Payer: MEDICARE

## 2019-11-15 LAB
ALBUMIN SERPL-MCNC: 2.8 G/DL (ref 3.5–5.2)
ALBUMIN/GLOBULIN RATIO: 1 (ref 1–2.5)
ALP BLD-CCNC: 135 U/L (ref 35–104)
ALT SERPL-CCNC: 53 U/L (ref 5–33)
ANION GAP SERPL CALCULATED.3IONS-SCNC: 12 MMOL/L (ref 9–17)
AST SERPL-CCNC: 76 U/L
BILIRUB SERPL-MCNC: 0.83 MG/DL (ref 0.3–1.2)
BUN BLDV-MCNC: 23 MG/DL (ref 8–23)
BUN/CREAT BLD: 36 (ref 9–20)
CALCIUM SERPL-MCNC: 8.6 MG/DL (ref 8.6–10.4)
CHLORIDE BLD-SCNC: 107 MMOL/L (ref 98–107)
CO2: 22 MMOL/L (ref 20–31)
CREAT SERPL-MCNC: 0.64 MG/DL (ref 0.5–0.9)
GFR AFRICAN AMERICAN: >60 ML/MIN
GFR NON-AFRICAN AMERICAN: >60 ML/MIN
GFR SERPL CREATININE-BSD FRML MDRD: ABNORMAL ML/MIN/{1.73_M2}
GFR SERPL CREATININE-BSD FRML MDRD: ABNORMAL ML/MIN/{1.73_M2}
GLUCOSE BLD-MCNC: 128 MG/DL (ref 70–99)
HCT VFR BLD CALC: 41.3 % (ref 36.3–47.1)
HEMOGLOBIN: 13.4 G/DL (ref 11.9–15.1)
MCH RBC QN AUTO: 32.1 PG (ref 25.2–33.5)
MCHC RBC AUTO-ENTMCNC: 32.4 G/DL (ref 28.4–34.8)
MCV RBC AUTO: 99 FL (ref 82.6–102.9)
NRBC AUTOMATED: 0 PER 100 WBC
PDW BLD-RTO: 16 % (ref 11.8–14.4)
PLATELET # BLD: 192 K/UL (ref 138–453)
PMV BLD AUTO: 12 FL (ref 8.1–13.5)
POTASSIUM SERPL-SCNC: 4.3 MMOL/L (ref 3.7–5.3)
RBC # BLD: 4.17 M/UL (ref 3.95–5.11)
SODIUM BLD-SCNC: 141 MMOL/L (ref 135–144)
TOTAL PROTEIN: 5.7 G/DL (ref 6.4–8.3)
WBC # BLD: 14.2 K/UL (ref 3.5–11.3)

## 2019-11-15 PROCEDURE — P9604 ONE-WAY ALLOW PRORATED TRIP: HCPCS

## 2019-11-15 PROCEDURE — 36415 COLL VENOUS BLD VENIPUNCTURE: CPT

## 2019-11-15 PROCEDURE — 80053 COMPREHEN METABOLIC PANEL: CPT

## 2019-11-15 PROCEDURE — 85027 COMPLETE CBC AUTOMATED: CPT

## 2019-11-17 LAB
CULTURE: ABNORMAL
CULTURE: ABNORMAL
Lab: ABNORMAL
SPECIMEN DESCRIPTION: ABNORMAL

## 2020-01-09 ENCOUNTER — HOSPITAL ENCOUNTER (OUTPATIENT)
Age: 85
Setting detail: SPECIMEN
Discharge: HOME | End: 2020-01-09
Payer: COMMERCIAL

## 2020-01-09 LAB
-: ABNORMAL
AMORPHOUS: ABNORMAL
BACTERIA: ABNORMAL
BILIRUBIN URINE: NEGATIVE
CASTS UA: ABNORMAL /LPF
COLOR: YELLOW
COMMENT UA: ABNORMAL
CRYSTALS, UA: ABNORMAL /HPF
EPITHELIAL CELLS UA: ABNORMAL /HPF (ref 0–25)
GLUCOSE URINE: NEGATIVE
KETONES, URINE: NEGATIVE
LEUKOCYTE ESTERASE, URINE: NEGATIVE
MUCUS: ABNORMAL
NITRITE, URINE: NEGATIVE
OTHER OBSERVATIONS UA: ABNORMAL
PH UA: 6 (ref 5–9)
PROTEIN UA: ABNORMAL
RBC UA: ABNORMAL /HPF (ref 0–2)
RENAL EPITHELIAL, UA: ABNORMAL /HPF
SPECIFIC GRAVITY UA: 1.01 (ref 1.01–1.02)
TRICHOMONAS: ABNORMAL
TURBIDITY: CLEAR
URINE HGB: NEGATIVE
UROBILINOGEN, URINE: NORMAL
WBC UA: ABNORMAL /HPF (ref 0–5)
YEAST: ABNORMAL

## 2020-01-09 PROCEDURE — 81001 URINALYSIS AUTO W/SCOPE: CPT

## 2024-08-24 NOTE — PROGRESS NOTES
Patient removed monitoring to include BP cuff.  Patient education provided on the need to monitor during the treatment.  Verbalized understanding.  BP cuff reapplied at this time.     Patient with c/o's abdominal discomfort after ambulating to and from bathroom. Denies pain at this time. States she had passed some gas, and thought it was just gas pains. Denies need for medication. Refuses laxative at this time, agreeable to try prune juice.